# Patient Record
Sex: FEMALE | Race: OTHER | HISPANIC OR LATINO | Employment: UNEMPLOYED | URBAN - METROPOLITAN AREA
[De-identification: names, ages, dates, MRNs, and addresses within clinical notes are randomized per-mention and may not be internally consistent; named-entity substitution may affect disease eponyms.]

---

## 2017-03-07 ENCOUNTER — ALLSCRIPTS OFFICE VISIT (OUTPATIENT)
Dept: OTHER | Facility: OTHER | Age: 52
End: 2017-03-07

## 2017-03-07 DIAGNOSIS — D72.829 ELEVATED WHITE BLOOD CELL COUNT: ICD-10-CM

## 2017-03-07 DIAGNOSIS — E03.9 HYPOTHYROIDISM: ICD-10-CM

## 2017-03-12 ENCOUNTER — HOSPITAL ENCOUNTER (EMERGENCY)
Facility: HOSPITAL | Age: 52
Discharge: HOME | End: 2017-03-12
Attending: EMERGENCY MEDICINE | Admitting: EMERGENCY MEDICINE
Payer: COMMERCIAL

## 2017-03-12 ENCOUNTER — APPOINTMENT (EMERGENCY)
Dept: RADIOLOGY | Facility: HOSPITAL | Age: 52
End: 2017-03-12
Payer: COMMERCIAL

## 2017-03-12 VITALS
DIASTOLIC BLOOD PRESSURE: 83 MMHG | RESPIRATION RATE: 18 BRPM | TEMPERATURE: 98.1 F | OXYGEN SATURATION: 97 % | HEART RATE: 72 BPM | WEIGHT: 130 LBS | BODY MASS INDEX: 25.39 KG/M2 | SYSTOLIC BLOOD PRESSURE: 141 MMHG

## 2017-03-12 DIAGNOSIS — J20.9 BRONCHITIS, ACUTE: ICD-10-CM

## 2017-03-12 DIAGNOSIS — R52 BODY ACHES: Primary | ICD-10-CM

## 2017-03-12 LAB
ALBUMIN SERPL BCP-MCNC: 3.8 G/DL (ref 3.5–5)
ALP SERPL-CCNC: 55 U/L (ref 46–116)
ALT SERPL W P-5'-P-CCNC: 20 U/L (ref 12–78)
ANION GAP SERPL CALCULATED.3IONS-SCNC: 13 MMOL/L (ref 4–13)
AST SERPL W P-5'-P-CCNC: 14 U/L (ref 5–45)
BACTERIA UR QL AUTO: ABNORMAL /HPF
BASOPHILS # BLD AUTO: 0.04 THOUSANDS/ΜL (ref 0–0.1)
BASOPHILS NFR BLD AUTO: 0 % (ref 0–1)
BILIRUB SERPL-MCNC: 1.24 MG/DL (ref 0.2–1)
BILIRUB UR QL STRIP: ABNORMAL
BUN SERPL-MCNC: 16 MG/DL (ref 5–25)
CALCIUM SERPL-MCNC: 9.1 MG/DL (ref 8.3–10.1)
CHLORIDE SERPL-SCNC: 103 MMOL/L (ref 100–108)
CLARITY UR: ABNORMAL
CO2 SERPL-SCNC: 24 MMOL/L (ref 21–32)
COLOR UR: ABNORMAL
CREAT SERPL-MCNC: 0.8 MG/DL (ref 0.6–1.3)
EOSINOPHIL # BLD AUTO: 0.06 THOUSAND/ΜL (ref 0–0.61)
EOSINOPHIL NFR BLD AUTO: 0 % (ref 0–6)
ERYTHROCYTE [DISTWIDTH] IN BLOOD BY AUTOMATED COUNT: 14.2 % (ref 11.6–15.1)
FLUAV AG SPEC QL IA: NEGATIVE
FLUBV AG SPEC QL IA: NEGATIVE
GFR SERPL CREATININE-BSD FRML MDRD: >60 ML/MIN/1.73SQ M
GLUCOSE SERPL-MCNC: 96 MG/DL (ref 65–140)
GLUCOSE UR STRIP-MCNC: NEGATIVE MG/DL
HCT VFR BLD AUTO: 42.8 % (ref 34.8–46.1)
HGB BLD-MCNC: 15.3 G/DL (ref 11.5–15.4)
HGB UR QL STRIP.AUTO: ABNORMAL
KETONES UR STRIP-MCNC: ABNORMAL MG/DL
LEUKOCYTE ESTERASE UR QL STRIP: NEGATIVE
LYMPHOCYTES # BLD AUTO: 1.98 THOUSANDS/ΜL (ref 0.6–4.47)
LYMPHOCYTES NFR BLD AUTO: 10 % (ref 14–44)
MCH RBC QN AUTO: 32.4 PG (ref 26.8–34.3)
MCHC RBC AUTO-ENTMCNC: 35.7 G/DL (ref 31.4–37.4)
MCV RBC AUTO: 91 FL (ref 82–98)
MONOCYTES # BLD AUTO: 1.71 THOUSAND/ΜL (ref 0.17–1.22)
MONOCYTES NFR BLD AUTO: 9 % (ref 4–12)
MUCOUS THREADS UR QL AUTO: ABNORMAL
NEUTROPHILS # BLD AUTO: 15.48 THOUSANDS/ΜL (ref 1.85–7.62)
NEUTS SEG NFR BLD AUTO: 81 % (ref 43–75)
NITRITE UR QL STRIP: NEGATIVE
NON-SQ EPI CELLS URNS QL MICRO: ABNORMAL /HPF
NRBC BLD AUTO-RTO: 0 /100 WBCS
PH UR STRIP.AUTO: 5.5 [PH] (ref 4.5–8)
PLATELET # BLD AUTO: 221 THOUSANDS/UL (ref 149–390)
PMV BLD AUTO: 12.6 FL (ref 8.9–12.7)
POTASSIUM SERPL-SCNC: 3.7 MMOL/L (ref 3.5–5.3)
PROT SERPL-MCNC: 7.7 G/DL (ref 6.4–8.2)
PROT UR STRIP-MCNC: ABNORMAL MG/DL
RBC # BLD AUTO: 4.72 MILLION/UL (ref 3.81–5.12)
RBC #/AREA URNS AUTO: ABNORMAL /HPF
SODIUM SERPL-SCNC: 140 MMOL/L (ref 136–145)
SP GR UR STRIP.AUTO: 1.02 (ref 1–1.03)
UROBILINOGEN UR QL STRIP.AUTO: 1 E.U./DL
WBC # BLD AUTO: 19.27 THOUSAND/UL (ref 4.31–10.16)
WBC #/AREA URNS AUTO: ABNORMAL /HPF

## 2017-03-12 PROCEDURE — 71020 HB CHEST X-RAY 2VW FRONTAL&LATL: CPT

## 2017-03-12 PROCEDURE — 96361 HYDRATE IV INFUSION ADD-ON: CPT

## 2017-03-12 PROCEDURE — 87086 URINE CULTURE/COLONY COUNT: CPT

## 2017-03-12 PROCEDURE — 99284 EMERGENCY DEPT VISIT MOD MDM: CPT

## 2017-03-12 PROCEDURE — 87400 INFLUENZA A/B EACH AG IA: CPT | Performed by: EMERGENCY MEDICINE

## 2017-03-12 PROCEDURE — 87798 DETECT AGENT NOS DNA AMP: CPT | Performed by: EMERGENCY MEDICINE

## 2017-03-12 PROCEDURE — 96360 HYDRATION IV INFUSION INIT: CPT

## 2017-03-12 PROCEDURE — 81001 URINALYSIS AUTO W/SCOPE: CPT

## 2017-03-12 PROCEDURE — 36415 COLL VENOUS BLD VENIPUNCTURE: CPT | Performed by: EMERGENCY MEDICINE

## 2017-03-12 PROCEDURE — 81002 URINALYSIS NONAUTO W/O SCOPE: CPT | Performed by: EMERGENCY MEDICINE

## 2017-03-12 PROCEDURE — 85025 COMPLETE CBC W/AUTO DIFF WBC: CPT | Performed by: EMERGENCY MEDICINE

## 2017-03-12 PROCEDURE — 80053 COMPREHEN METABOLIC PANEL: CPT | Performed by: EMERGENCY MEDICINE

## 2017-03-12 PROCEDURE — 93005 ELECTROCARDIOGRAM TRACING: CPT | Performed by: EMERGENCY MEDICINE

## 2017-03-12 RX ORDER — ACETAMINOPHEN 325 MG/1
975 TABLET ORAL ONCE
Status: COMPLETED | OUTPATIENT
Start: 2017-03-12 | End: 2017-03-12

## 2017-03-12 RX ORDER — LEVOFLOXACIN 750 MG/1
750 TABLET ORAL DAILY
Qty: 7 TABLET | Refills: 0 | Status: SHIPPED | OUTPATIENT
Start: 2017-03-12 | End: 2017-03-19

## 2017-03-12 RX ADMIN — ACETAMINOPHEN 975 MG: 325 TABLET, FILM COATED ORAL at 14:25

## 2017-03-12 RX ADMIN — SODIUM CHLORIDE 1000 ML: 0.9 INJECTION, SOLUTION INTRAVENOUS at 16:25

## 2017-03-12 RX ADMIN — LEVOFLOXACIN 750 MG: 500 TABLET, FILM COATED ORAL at 16:26

## 2017-03-12 RX ADMIN — SODIUM CHLORIDE 1000 ML: 0.9 INJECTION, SOLUTION INTRAVENOUS at 14:29

## 2017-03-13 LAB
BACTERIA UR CULT: NORMAL
FLUAV AG SPEC QL: NORMAL
FLUBV AG SPEC QL: NORMAL
RSV B RNA SPEC QL NAA+PROBE: NORMAL

## 2017-03-14 LAB
ATRIAL RATE: 58 BPM
P AXIS: 29 DEGREES
PR INTERVAL: 152 MS
QRS AXIS: 86 DEGREES
QRSD INTERVAL: 76 MS
QT INTERVAL: 418 MS
QTC INTERVAL: 410 MS
T WAVE AXIS: 57 DEGREES
VENTRICULAR RATE: 58 BPM

## 2017-03-20 ENCOUNTER — ALLSCRIPTS OFFICE VISIT (OUTPATIENT)
Dept: OTHER | Facility: OTHER | Age: 52
End: 2017-03-20

## 2017-05-15 ENCOUNTER — APPOINTMENT (OUTPATIENT)
Dept: LAB | Facility: HOSPITAL | Age: 52
End: 2017-05-15
Payer: COMMERCIAL

## 2017-05-15 ENCOUNTER — GENERIC CONVERSION - ENCOUNTER (OUTPATIENT)
Dept: OTHER | Facility: OTHER | Age: 52
End: 2017-05-15

## 2017-05-15 DIAGNOSIS — Z87.2 PERSONAL HISTORY OF DISEASES OF SKIN OR SUBCUTANEOUS TISSUE: ICD-10-CM

## 2017-05-15 DIAGNOSIS — E03.9 HYPOTHYROIDISM: ICD-10-CM

## 2017-05-15 DIAGNOSIS — R91.1 SOLITARY PULMONARY NODULE: ICD-10-CM

## 2017-05-15 LAB
ALBUMIN SERPL BCP-MCNC: 3.5 G/DL (ref 3.5–5)
ALP SERPL-CCNC: 56 U/L (ref 46–116)
ALT SERPL W P-5'-P-CCNC: 19 U/L (ref 12–78)
ANION GAP SERPL CALCULATED.3IONS-SCNC: 5 MMOL/L (ref 4–13)
AST SERPL W P-5'-P-CCNC: 14 U/L (ref 5–45)
BASOPHILS # BLD AUTO: 0.05 THOUSANDS/ΜL (ref 0–0.1)
BASOPHILS NFR BLD AUTO: 1 % (ref 0–1)
BILIRUB SERPL-MCNC: 0.3 MG/DL (ref 0.2–1)
BUN SERPL-MCNC: 10 MG/DL (ref 5–25)
CALCIUM SERPL-MCNC: 8.8 MG/DL (ref 8.3–10.1)
CHLORIDE SERPL-SCNC: 105 MMOL/L (ref 100–108)
CO2 SERPL-SCNC: 30 MMOL/L (ref 21–32)
CREAT SERPL-MCNC: 0.88 MG/DL (ref 0.6–1.3)
EOSINOPHIL # BLD AUTO: 0.3 THOUSAND/ΜL (ref 0–0.61)
EOSINOPHIL NFR BLD AUTO: 4 % (ref 0–6)
ERYTHROCYTE [DISTWIDTH] IN BLOOD BY AUTOMATED COUNT: 14.9 % (ref 11.6–15.1)
GFR SERPL CREATININE-BSD FRML MDRD: >60 ML/MIN/1.73SQ M
GLUCOSE P FAST SERPL-MCNC: 94 MG/DL (ref 65–99)
HCT VFR BLD AUTO: 43.2 % (ref 34.8–46.1)
HGB BLD-MCNC: 14.7 G/DL (ref 11.5–15.4)
LYMPHOCYTES # BLD AUTO: 3 THOUSANDS/ΜL (ref 0.6–4.47)
LYMPHOCYTES NFR BLD AUTO: 36 % (ref 14–44)
MCH RBC QN AUTO: 31.1 PG (ref 26.8–34.3)
MCHC RBC AUTO-ENTMCNC: 34 G/DL (ref 31.4–37.4)
MCV RBC AUTO: 92 FL (ref 82–98)
MONOCYTES # BLD AUTO: 1.06 THOUSAND/ΜL (ref 0.17–1.22)
MONOCYTES NFR BLD AUTO: 13 % (ref 4–12)
NEUTROPHILS # BLD AUTO: 3.93 THOUSANDS/ΜL (ref 1.85–7.62)
NEUTS SEG NFR BLD AUTO: 46 % (ref 43–75)
PLATELET # BLD AUTO: 256 THOUSANDS/UL (ref 149–390)
PMV BLD AUTO: 12.2 FL (ref 8.9–12.7)
POTASSIUM SERPL-SCNC: 3.9 MMOL/L (ref 3.5–5.3)
PROT SERPL-MCNC: 6.8 G/DL (ref 6.4–8.2)
RBC # BLD AUTO: 4.72 MILLION/UL (ref 3.81–5.12)
SODIUM SERPL-SCNC: 140 MMOL/L (ref 136–145)
TSH SERPL DL<=0.05 MIU/L-ACNC: 39.61 UIU/ML (ref 0.36–3.74)
WBC # BLD AUTO: 8.34 THOUSAND/UL (ref 4.31–10.16)

## 2017-05-15 PROCEDURE — 36415 COLL VENOUS BLD VENIPUNCTURE: CPT

## 2017-05-15 PROCEDURE — 85025 COMPLETE CBC W/AUTO DIFF WBC: CPT

## 2017-05-15 PROCEDURE — 84443 ASSAY THYROID STIM HORMONE: CPT

## 2017-05-15 PROCEDURE — 80053 COMPREHEN METABOLIC PANEL: CPT

## 2017-05-17 ENCOUNTER — TRANSCRIBE ORDERS (OUTPATIENT)
Dept: ADMINISTRATIVE | Facility: HOSPITAL | Age: 52
End: 2017-05-17

## 2017-05-17 DIAGNOSIS — Z12.31 VISIT FOR SCREENING MAMMOGRAM: Primary | ICD-10-CM

## 2017-05-22 ENCOUNTER — HOSPITAL ENCOUNTER (OUTPATIENT)
Dept: MAMMOGRAPHY | Facility: HOSPITAL | Age: 52
Discharge: HOME | End: 2017-05-22
Payer: COMMERCIAL

## 2017-05-22 ENCOUNTER — GENERIC CONVERSION - ENCOUNTER (OUTPATIENT)
Dept: OTHER | Facility: OTHER | Age: 52
End: 2017-05-22

## 2017-05-22 DIAGNOSIS — Z12.31 ENCOUNTER FOR SCREENING MAMMOGRAM FOR MALIGNANT NEOPLASM OF BREAST: ICD-10-CM

## 2017-05-22 PROCEDURE — G0202 SCR MAMMO BI INCL CAD: HCPCS

## 2017-05-26 ENCOUNTER — HOSPITAL ENCOUNTER (EMERGENCY)
Facility: HOSPITAL | Age: 52
Discharge: HOME | End: 2017-05-26
Admitting: EMERGENCY MEDICINE
Payer: COMMERCIAL

## 2017-05-26 ENCOUNTER — APPOINTMENT (EMERGENCY)
Dept: RADIOLOGY | Facility: HOSPITAL | Age: 52
End: 2017-05-26
Payer: COMMERCIAL

## 2017-05-26 ENCOUNTER — LAB CONVERSION - ENCOUNTER (OUTPATIENT)
Dept: OTHER | Facility: OTHER | Age: 52
End: 2017-05-26

## 2017-05-26 VITALS
HEART RATE: 62 BPM | TEMPERATURE: 98.1 F | WEIGHT: 134.48 LBS | RESPIRATION RATE: 16 BRPM | OXYGEN SATURATION: 94 % | DIASTOLIC BLOOD PRESSURE: 63 MMHG | SYSTOLIC BLOOD PRESSURE: 107 MMHG | BODY MASS INDEX: 26.26 KG/M2

## 2017-05-26 DIAGNOSIS — R07.9 CHEST PAIN: Primary | ICD-10-CM

## 2017-05-26 DIAGNOSIS — T14.8XXA MUSCULOSKELETAL STRAIN: ICD-10-CM

## 2017-05-26 LAB
ANION GAP SERPL CALCULATED.3IONS-SCNC: 7 MMOL/L (ref 4–13)
ANISOCYTOSIS BLD QL SMEAR: PRESENT
ATRIAL RATE: 66 BPM
BASOPHILS # BLD MANUAL: 0 THOUSAND/UL (ref 0–0.1)
BASOPHILS NFR MAR MANUAL: 0 % (ref 0–1)
BUN SERPL-MCNC: 13 MG/DL (ref 5–25)
CALCIUM SERPL-MCNC: 9.1 MG/DL (ref 8.3–10.1)
CHLORIDE SERPL-SCNC: 105 MMOL/L (ref 100–108)
CO2 SERPL-SCNC: 29 MMOL/L (ref 21–32)
CREAT SERPL-MCNC: 0.88 MG/DL (ref 0.6–1.3)
DEPRECATED D DIMER PPP: <270 NG/ML (FEU) (ref 0–424)
EOSINOPHIL # BLD MANUAL: 0.17 THOUSAND/UL (ref 0–0.4)
EOSINOPHIL NFR BLD MANUAL: 2 % (ref 0–6)
ERYTHROCYTE [DISTWIDTH] IN BLOOD BY AUTOMATED COUNT: 14.6 % (ref 11.6–15.1)
GFR SERPL CREATININE-BSD FRML MDRD: >60 ML/MIN/1.73SQ M
GLUCOSE SERPL-MCNC: 116 MG/DL (ref 65–140)
HCT VFR BLD AUTO: 40.2 % (ref 34.8–46.1)
HGB BLD-MCNC: 14.4 G/DL (ref 11.5–15.4)
LG PLATELETS BLD QL SMEAR: PRESENT
LYMPHOCYTES # BLD AUTO: 1.6 THOUSAND/UL (ref 0.6–4.47)
LYMPHOCYTES # BLD AUTO: 19 % (ref 14–44)
MCH RBC QN AUTO: 32.5 PG (ref 26.8–34.3)
MCHC RBC AUTO-ENTMCNC: 35.8 G/DL (ref 31.4–37.4)
MCV RBC AUTO: 91 FL (ref 82–98)
MONOCYTES # BLD AUTO: 0.93 THOUSAND/UL (ref 0–1.22)
MONOCYTES NFR BLD: 11 % (ref 4–12)
NEUTROPHILS # BLD MANUAL: 5.47 THOUSAND/UL (ref 1.85–7.62)
NEUTS BAND NFR BLD MANUAL: 2 % (ref 0–8)
NEUTS SEG NFR BLD AUTO: 63 % (ref 43–75)
NRBC BLD AUTO-RTO: 0 /100 WBCS
P AXIS: 59 DEGREES
PLATELET # BLD AUTO: 279 THOUSANDS/UL (ref 149–390)
PLATELET BLD QL SMEAR: ADEQUATE
PMV BLD AUTO: 12.5 FL (ref 8.9–12.7)
POTASSIUM SERPL-SCNC: 3.6 MMOL/L (ref 3.5–5.3)
PR INTERVAL: 178 MS
QRS AXIS: 83 DEGREES
QRSD INTERVAL: 80 MS
QT INTERVAL: 392 MS
QTC INTERVAL: 410 MS
RBC # BLD AUTO: 4.43 MILLION/UL (ref 3.81–5.12)
SODIUM SERPL-SCNC: 141 MMOL/L (ref 136–145)
SPECIMEN SOURCE: NORMAL
T WAVE AXIS: 63 DEGREES
TOTAL CELLS COUNTED SPEC: 100
TROPONIN I BLD-MCNC: 0 NG/ML (ref 0–0.08)
VARIANT LYMPHS # BLD AUTO: 3 %
VENTRICULAR RATE: 66 BPM
WBC # BLD AUTO: 8.41 THOUSAND/UL (ref 4.31–10.16)

## 2017-05-26 PROCEDURE — 36415 COLL VENOUS BLD VENIPUNCTURE: CPT | Performed by: PHYSICIAN ASSISTANT

## 2017-05-26 PROCEDURE — 71020 HB CHEST X-RAY 2VW FRONTAL&LATL: CPT

## 2017-05-26 PROCEDURE — 85379 FIBRIN DEGRADATION QUANT: CPT | Performed by: PHYSICIAN ASSISTANT

## 2017-05-26 PROCEDURE — 85027 COMPLETE CBC AUTOMATED: CPT | Performed by: PHYSICIAN ASSISTANT

## 2017-05-26 PROCEDURE — 85007 BL SMEAR W/DIFF WBC COUNT: CPT | Performed by: PHYSICIAN ASSISTANT

## 2017-05-26 PROCEDURE — 80048 BASIC METABOLIC PNL TOTAL CA: CPT | Performed by: PHYSICIAN ASSISTANT

## 2017-05-26 PROCEDURE — 93005 ELECTROCARDIOGRAM TRACING: CPT | Performed by: PHYSICIAN ASSISTANT

## 2017-05-26 PROCEDURE — 99284 EMERGENCY DEPT VISIT MOD MDM: CPT

## 2017-05-26 PROCEDURE — 84484 ASSAY OF TROPONIN QUANT: CPT

## 2017-05-26 RX ORDER — DIAZEPAM 5 MG/1
5 TABLET ORAL ONCE
Status: COMPLETED | OUTPATIENT
Start: 2017-05-26 | End: 2017-05-26

## 2017-05-26 RX ORDER — METHOCARBAMOL 500 MG/1
500 TABLET, FILM COATED ORAL 4 TIMES DAILY
Qty: 12 TABLET | Refills: 0 | Status: SHIPPED | OUTPATIENT
Start: 2017-05-26 | End: 2018-02-21

## 2017-05-26 RX ORDER — CETIRIZINE HYDROCHLORIDE 10 MG/1
10 TABLET ORAL DAILY
COMMUNITY
End: 2018-02-21

## 2017-05-26 RX ORDER — ACETAMINOPHEN 325 MG/1
TABLET ORAL
Qty: 20 TABLET | Refills: 0 | Status: SHIPPED | OUTPATIENT
Start: 2017-05-26 | End: 2018-02-21

## 2017-05-26 RX ORDER — NITROGLYCERIN 0.4 MG/1
0.4 TABLET SUBLINGUAL ONCE
Status: COMPLETED | OUTPATIENT
Start: 2017-05-26 | End: 2017-05-26

## 2017-05-26 RX ADMIN — NITROGLYCERIN 0.4 MG: 0.4 TABLET SUBLINGUAL at 18:17

## 2017-05-26 RX ADMIN — DIAZEPAM 5 MG: 5 TABLET ORAL at 18:51

## 2017-05-31 ENCOUNTER — ALLSCRIPTS OFFICE VISIT (OUTPATIENT)
Dept: OTHER | Facility: OTHER | Age: 52
End: 2017-05-31

## 2017-05-31 ENCOUNTER — TRANSCRIBE ORDERS (OUTPATIENT)
Dept: ADMINISTRATIVE | Facility: HOSPITAL | Age: 52
End: 2017-05-31

## 2017-05-31 DIAGNOSIS — R91.1 COIN LESION: Primary | ICD-10-CM

## 2017-06-07 ENCOUNTER — HOSPITAL ENCOUNTER (OUTPATIENT)
Dept: CT IMAGING | Facility: HOSPITAL | Age: 52
Discharge: HOME | End: 2017-06-07
Payer: COMMERCIAL

## 2017-06-07 DIAGNOSIS — R91.1 SOLITARY PULMONARY NODULE: ICD-10-CM

## 2017-06-07 PROCEDURE — 71250 CT THORAX DX C-: CPT

## 2017-06-09 ENCOUNTER — GENERIC CONVERSION - ENCOUNTER (OUTPATIENT)
Dept: OTHER | Facility: OTHER | Age: 52
End: 2017-06-09

## 2017-08-08 ENCOUNTER — APPOINTMENT (OUTPATIENT)
Dept: LAB | Facility: HOSPITAL | Age: 52
End: 2017-08-08
Payer: COMMERCIAL

## 2017-08-08 ENCOUNTER — TRANSCRIBE ORDERS (OUTPATIENT)
Dept: ADMINISTRATIVE | Facility: HOSPITAL | Age: 52
End: 2017-08-08

## 2017-08-08 DIAGNOSIS — E03.9 UNSPECIFIED HYPOTHYROIDISM: Primary | ICD-10-CM

## 2017-08-08 DIAGNOSIS — E03.9 UNSPECIFIED HYPOTHYROIDISM: ICD-10-CM

## 2017-08-08 LAB
ALBUMIN SERPL BCP-MCNC: 3.6 G/DL (ref 3.5–5)
ALP SERPL-CCNC: 52 U/L (ref 46–116)
ALT SERPL W P-5'-P-CCNC: 28 U/L (ref 12–78)
ANION GAP SERPL CALCULATED.3IONS-SCNC: 7 MMOL/L (ref 4–13)
AST SERPL W P-5'-P-CCNC: 17 U/L (ref 5–45)
BASOPHILS # BLD AUTO: 0.04 THOUSANDS/ΜL (ref 0–0.1)
BASOPHILS NFR BLD AUTO: 1 % (ref 0–1)
BILIRUB SERPL-MCNC: 0.48 MG/DL (ref 0.2–1)
BUN SERPL-MCNC: 11 MG/DL (ref 5–25)
CALCIUM SERPL-MCNC: 9.4 MG/DL (ref 8.3–10.1)
CHLORIDE SERPL-SCNC: 102 MMOL/L (ref 100–108)
CO2 SERPL-SCNC: 30 MMOL/L (ref 21–32)
CREAT SERPL-MCNC: 0.88 MG/DL (ref 0.6–1.3)
EOSINOPHIL # BLD AUTO: 0.23 THOUSAND/ΜL (ref 0–0.61)
EOSINOPHIL NFR BLD AUTO: 4 % (ref 0–6)
ERYTHROCYTE [DISTWIDTH] IN BLOOD BY AUTOMATED COUNT: 14.2 % (ref 11.6–15.1)
GFR SERPL CREATININE-BSD FRML MDRD: 75 ML/MIN/1.73SQ M
GLUCOSE P FAST SERPL-MCNC: 145 MG/DL (ref 65–99)
HCT VFR BLD AUTO: 44 % (ref 34.8–46.1)
HGB BLD-MCNC: 15.2 G/DL (ref 11.5–15.4)
LYMPHOCYTES # BLD AUTO: 1.85 THOUSANDS/ΜL (ref 0.6–4.47)
LYMPHOCYTES NFR BLD AUTO: 30 % (ref 14–44)
MCH RBC QN AUTO: 30.5 PG (ref 26.8–34.3)
MCHC RBC AUTO-ENTMCNC: 34.5 G/DL (ref 31.4–37.4)
MCV RBC AUTO: 88 FL (ref 82–98)
MONOCYTES # BLD AUTO: 0.64 THOUSAND/ΜL (ref 0.17–1.22)
MONOCYTES NFR BLD AUTO: 10 % (ref 4–12)
NEUTROPHILS # BLD AUTO: 3.45 THOUSANDS/ΜL (ref 1.85–7.62)
NEUTS SEG NFR BLD AUTO: 55 % (ref 43–75)
PLATELET # BLD AUTO: 265 THOUSANDS/UL (ref 149–390)
PMV BLD AUTO: 12.6 FL (ref 8.9–12.7)
POTASSIUM SERPL-SCNC: 3.9 MMOL/L (ref 3.5–5.3)
PROT SERPL-MCNC: 7 G/DL (ref 6.4–8.2)
RBC # BLD AUTO: 4.98 MILLION/UL (ref 3.81–5.12)
SODIUM SERPL-SCNC: 139 MMOL/L (ref 136–145)
TSH SERPL DL<=0.05 MIU/L-ACNC: 0.58 UIU/ML (ref 0.36–3.74)
WBC # BLD AUTO: 6.21 THOUSAND/UL (ref 4.31–10.16)

## 2017-08-08 PROCEDURE — 84443 ASSAY THYROID STIM HORMONE: CPT

## 2017-08-08 PROCEDURE — 80053 COMPREHEN METABOLIC PANEL: CPT

## 2017-08-08 PROCEDURE — 36415 COLL VENOUS BLD VENIPUNCTURE: CPT

## 2017-08-08 PROCEDURE — 85025 COMPLETE CBC W/AUTO DIFF WBC: CPT

## 2017-08-09 ENCOUNTER — GENERIC CONVERSION - ENCOUNTER (OUTPATIENT)
Dept: OTHER | Facility: OTHER | Age: 52
End: 2017-08-09

## 2017-09-12 ENCOUNTER — ALLSCRIPTS OFFICE VISIT (OUTPATIENT)
Dept: OTHER | Facility: OTHER | Age: 52
End: 2017-09-12

## 2017-09-12 DIAGNOSIS — E03.9 HYPOTHYROIDISM: ICD-10-CM

## 2017-09-28 ENCOUNTER — APPOINTMENT (OUTPATIENT)
Dept: LAB | Facility: HOSPITAL | Age: 52
End: 2017-09-28
Payer: COMMERCIAL

## 2017-09-28 ENCOUNTER — GENERIC CONVERSION - ENCOUNTER (OUTPATIENT)
Dept: OTHER | Facility: OTHER | Age: 52
End: 2017-09-28

## 2017-09-28 DIAGNOSIS — E03.9 HYPOTHYROIDISM: ICD-10-CM

## 2017-09-28 LAB — TSH SERPL DL<=0.05 MIU/L-ACNC: 0.37 UIU/ML (ref 0.36–3.74)

## 2017-09-28 PROCEDURE — 36415 COLL VENOUS BLD VENIPUNCTURE: CPT

## 2017-09-28 PROCEDURE — 84443 ASSAY THYROID STIM HORMONE: CPT

## 2018-01-10 NOTE — PROGRESS NOTES
Assessment    1  Encounter for preventive health examination (V70 0) (Z00 00)    Plan  Allergic rhinitis, Asthma    · Alexey WINN, John Denise (Allergy/Immunology) Co-Management  *  Status: Complete   Done: [id number]  are Referring to a non- Preferred Provider : Services not provided in network  Care Summary provided  : Yes  Asplenia    · Td  Colon cancer screening    · (1) Kat Singleton; Status:Active; Requested for:93Gpn9903;   cont  : I authorize [address] to obtain reimbursement for      Cologuard and to directly contact and [facility]      if reportable results are not obtained from the initial sample   cont  : I accept responsibility for maintaining the privacy of test results and      related information as required by HIPAA   : By ordering Cologuard, I certify that I am a licensed medical professional      authorized to order Cologuard  I acknowledge that the test is medically necessary and      that the patient is eligible to use Cologuard  Encounter for screening colonoscopy    · *1 -  [facility] Co-Management  *Should do screening  colonoscopy  Status: Canceled  Care Summary provided  : Yes  Hypothyroidism    · (1) TSH; Status:Active; Requested for:33Wku8987;     Discussion/Summary  health maintenance visit cervical cancer screening is current     Doing well overall -     Previous chest x-ray at emergency room showed possible lung nodule, CT scan chest was negative  Past smoker, quit around 3 yrs ago  Still notes chest tightness w need for rescue inhaler up to twice a day despite using Flovent 11 BID and Singulair daily- last PFT 9/15 w mild obstructive findings w no reversibility  I'd like her to see Allergist   Do Flu shot in October  ** Hx Splenectomy ** She rcvd Menactra/ HIB/ Pneumovax in past  Rcvd Tdap remotely - unsure date - can do Td  today    Had chickenpox as child - can do Zostavax w Flu shot in Oct ( after 4 weeks) Previous issue with neck pain along with right cervical radiculitis resolved, she stopped gabapentin    On Levothyroxine - TSH improved to 0 583 early August - use med as is and redo TSH once more in Oct to monitor for oversuppression ( prev TSH in May at 39 6)  CBC/CMP ok ( FBS fine few months ago - recent BW non-fasting )    She feels she cannot drink liquid for colonoscopy prep - look into coverage for ColoGuard  Mammo ok 5/17  ( does have small inclusion cyst right breast - if enlarges or worsens- can set up w Breast Spec )    UTD w PAP ( 11/15) Redo 11/18    Recheck here 6 mo  Call sooner as needed  Chief Complaint  Physical      History of Present Illness  HPI: in for check up      Active Problems    1  Allergic rhinitis (477 9) (J30 9)   2  Asthma (493 90) (J45 909)   3  History of ITP (V12 3) (Z86 2)   4  Hypothyroidism (244 9) (E03 9)   5  Leiomyoma of uterus (218 9) (D25 9)   6  Leukocytosis (288 60) (D72 829)   7  Need for revaccination (V05 9) (Z23)   8  History of Right cervical radiculopathy (723 4) (M54 12)   9  History of Splenectomy    Past Medical History    · History of Accidental fall (E888 9) (W19 XXXA)   · History of Cellulitis of left thigh (682 6) (L03 116)   · History of dermatitis (V13 3) (Z87 2)   · History of ingrown nail (V13 3) (Z87 2)   · History of ITP (V12 3) (Z86 2)   · History of pneumonia (V12 61) (Z87 01)   · Personal history of ovarian cyst (V13 29) (Z87 42)   · History of Previous Pregnancy - Vaginal Delivery   · History of Right cervical radiculopathy (723 4) (M54 12)    Surgical History    · History of Splenectomy   · History of Tubal Ligation    Family History  Sister    · Family history of malignant neoplasm of breast (V16 3) (Z80 3)  Family History    · Family history of diabetes mellitus (V18 0) (Z83 3)    Social History    · Exercise: Walking   · Former smoker (V15 82) (M61 543)   · quit around age 46   · No alcohol use   · Single    Current Meds   1   Flovent  MCG/ACT Inhalation Aerosol; Inhale 2 puffs twice a day; Therapy: 16WIH8768 to (Evaluate:10Oct2017)  Requested for: 13Apr2017; Last   Rx:13Apr2017 Ordered   2  Fluticasone Propionate 50 MCG/ACT Nasal Suspension; USE 2 SPRAYS IN EACH   NOSTRIL ONCE DAILY; Therapy: 11Lml2329 to (Marv Estrella)  Requested for: 13Apr2017; Last   Rx:13Apr2017 Ordered   3  Levothyroxine Sodium 125 MCG Oral Tablet; take 1 tablet every day; Therapy: 54Lax2584 to (Evaluate:09Mgm0372)  Requested for: 94MGH5288; Last   Rx:88Epr7456 Ordered   4  Montelukast Sodium 10 MG Oral Tablet; TAKE 1 TABLET DAILY; Therapy: 24Vzs4640 to (Evaluate:11Aug2017)  Requested for: 13Apr2017; Last   Rx:13Apr2017 Ordered   5  Ventolin  (90 Base) MCG/ACT Inhalation Aerosol Solution; INHALE 2 PUFFS 3   TIMES A DAY only AS NEEDED; Therapy: 52Hze3147 to (Evaluate:69Qlm3447)  Requested for: 13Apr2017; Last   Rx:13Apr2017 Ordered    Allergies    1  Aspirin Adult Low Strength TBEC   2  Ibuprofen IB TABS    Vitals   Recorded: 12Sep2017 09:48AM   Heart Rate 64   Systolic 967   Diastolic 84   Height 5 ft 3 in   Weight 129 lb    BMI Calculated 22 85   BSA Calculated 1 6     Physical Exam    Constitutional   General appearance: No acute distress, well appearing and well nourished  Head and Face   Head and face: Normal     Eyes   Conjunctiva and lids: No swelling, erythema or discharge  Ears, Nose, Mouth, and Throat   Hearing: Normal     Oropharynx: Normal with no erythema, edema, exudate or lesions  Neck   Neck: Supple, symmetric, trachea midline, no masses  somewhat bulky  Pulmonary   Auscultation of lungs: Clear to auscultation  Cardiovascular   Auscultation of heart: Normal rate and rhythm, normal S1 and S2, no murmurs  Carotid pulses: 2+ bilaterally  Examination of extremities for edema and/or varicosities: Normal     Chest right 4 oclock inclusion cyst - tiny  Abdomen   Abdomen: Non-tender, no masses      Lymphatic Palpation of lymph nodes in neck: No lymphadenopathy  Musculoskeletal   Range of motion: Normal     Stability: Normal     Muscle strength/tone: Normal     Neurologic   Cortical function: Normal mental status  Coordination: Normal finger to nose and heel to shin  Psychiatric   Judgment and insight: Normal     Orientation to person, place, and time: Normal     Recent and remote memory: Intact  Mood and affect: Normal        Results/Data  (1) COMPREHENSIVE METABOLIC PANEL [id number] 19:09SS Dillon Fox     Test Name Result Flag Reference   SODIUM 139 mmol/L  136-145   POTASSIUM 3 9 mmol/L  3 5-5 3   CHLORIDE 102 mmol/L  100-108   CARBON DIOXIDE 30 mmol/L  21-32   ANION GAP (CALC) 7 mmol/L  4-13   BLOOD UREA NITROGEN 11 mg/dL  5-25   CREATININE 0 88 mg/dL  0 60-1 30   Standardized to IDMS reference method   CALCIUM 9 4 mg/dL  8 3-10 1   BILI, TOTAL 0 48 mg/dL  0 20-1 00   ALK PHOSPHATAS 52 U/L     ALT (SGPT) 28 U/L  12-78   Specimen collection should occur prior to Sulfasalazine administration due to the potential for falsely depressed results  AST(SGOT) 17 U/L  5-45   Specimen collection should occur prior to Sulfasalazine administration due to the potential for falsely depressed results  ALBUMIN 3 6 g/dL  3 5-5 0   TOTAL PROTEIN 7 0 g/dL  6 4-8 2   eGFR 75 ml/min/1 73sq m     [facility] Disease Education Program recommendations are as follows:  GFR calculation is accurate only with a steady state creatinine  Chronic Kidney disease less than 60 ml/min/1 73 sq  meters  Kidney failure less than 15 ml/min/1 73 sq  meters  GLUCOSE FASTING 145 mg/dL H 65-99   Specimen collection should occur prior to Sulfasalazine administration due to the potential for falsely depressed results  Specimen collection should occur prior to Sulfapyridine administration due to the potential for falsely elevated results         Health Management  Health Maintenance   (1) THIN PREP PAP WITH IMAGING; every 1 year; Last 59JWY3120; Next Due:  38HQE4820;  Overdue    Future Appointments    Date/Time Provider Specialty Site   03/13/2018 09:00 AM Kalpesh Ross DO Family Medicine [facility] MEDICINE   10/10/2017 11:00 AM Nurse April Singer  [facility] [address] FAMILY MEDICINE     Signatures   Electronically signed by : Lalo Watkins DO; Sep 12 2017 12:23PM EST                       (Author)

## 2018-01-10 NOTE — RESULT NOTES
Verified Results  * MAMMO SCREENING BILATERAL W CAD [id number] 09:55AM Mary Alexis Order Number: [id number]    - Patient Instructions: To schedule this appointment, please contact Central Scheduling at [phone]  Do not wear any perfume, powder, lotion or deodorant on breast or underarm area  Please bring your doctors order, referral (if needed) and insurance information with you on the day of the test  Failure to bring this information may result in this test being rescheduled  Arrive 15 minutes prior to your appointment time to register  On the day of your test, please bring any prior mammogram or breast studies with you that were not performed at a [facility]  Failure to bring prior exams may result in your test needing to be rescheduled   Order Number: [id number]    - Patient Instructions: To schedule this appointment, please contact Central Scheduling at [phone]  Do not wear any perfume, powder, lotion or deodorant on breast or underarm area  Please bring your doctors order, referral (if needed) and insurance information with you on the day of the test  Failure to bring this information may result in this test being rescheduled  Arrive 15 minutes prior to your appointment time to register  On the day of your test, please bring any prior mammogram or breast studies with you that were not performed at a [facility]  Failure to bring prior exams may result in your test needing to be rescheduled  Test Name Result Flag Reference   MAMMO SCREENING BILATERAL W CAD (Report)     Patient History:   Family history of breast cancer at age 27 in maternal aunt,    breast cancer under age 48 in maternal cousin, breast cancer at    age 39 in sister  Patient is a former smoker  Patient's BMI is 23 4  Reason for exam: screening, asymptomatic       Mammo Screening Bilateral W CAD: May 22, 2017 - Check In #:    [de-identified]   Bilateral CC and MLO view(s) were taken  Technologist: SAM Tanner (SAM)(M)   Prior study comparison: December 16, 2014, bilateral digital    screening mammogram, performed at [place] [address]  December 9, 2013, bilateral digital screening    mammogram, performed at [place] [address]  March 7, 2011, bilateral digital screening mammogram,    performed at [place] [address]  July 1, 2008, bilateral DIGITAL SCREENING MAMMOGRAM, performed at [place] [address]  March 6, 2006, bilateral   screening mammogram w/CAD, performed at [place] [address]  October 20, 2005, left breast unilateral   diagnostic mammogram, performed at [place] [address]  March 3, 2005, bilateral screening mammogram    w/CAD, performed at [place] [address]  There are scattered fibroglandular densities  The parenchymal pattern appears stable  No dominant soft tissue    mass or suspicious calcifications are noted  The skin and nipple   contours are within normal limits  No mammographic evidence of malignancy  No    significant changes when compared with prior studies  ACR BI-RADSï¾® Assessments: BiRad:1 - Negative     Recommendation:   Routine screening mammogram in 1 year  A reminder letter will be   scheduled  Analyzed by CAD     8-10% of cancers will be missed on mammography  Management of a    palpable abnormality must be based on clinical grounds  Patients   will be notified of their results via letter from our facility  Accredited by [employer] of [employer] and FDA       Transcription Location: SAM Saunders 98: [id number]     Risk Value(s):   Tyrer-Cuzick 10 Year: 5 500%, Tyrer-Cuzick Lifetime: 19 400%,    Myriad Table: 5 6%, BAILEY 5 Year: 1 6%, NCI Lifetime: 12 2%, MRS    : Based on personal and/or family history,    consideration of hereditary risk assessment may be warranted     Signed by:   Marii Allison MD   5/22/17

## 2018-01-12 VITALS
SYSTOLIC BLOOD PRESSURE: 124 MMHG | HEIGHT: 60 IN | OXYGEN SATURATION: 98 % | TEMPERATURE: 97.6 F | WEIGHT: 133 LBS | HEART RATE: 60 BPM | BODY MASS INDEX: 26.11 KG/M2 | DIASTOLIC BLOOD PRESSURE: 80 MMHG | RESPIRATION RATE: 16 BRPM

## 2018-01-12 NOTE — RESULT NOTES
Verified Results  (1) TSH [id number] 07:23AM Arron  Order Number: [id number]     Test Name Result Flag Reference   TSH 39 610 uIU/mL H 0 358-3 740   - Patient Instructions: This bloodwork is non-fasting  Please drink two glasses of water morning of bloodwork  Patients undergoing fluorescein dye angiography may retain small amounts of fluorescein in the body for 48-72 hours post procedure  Samples containing fluorescein can produce falsely depressed TSH values  If the patient had this procedure,a specimen should be resubmitted post fluorescein clearance            The recommended reference ranges for TSH during pregnancy are as follows:  First trimester 0 1 to 2 5 uIU/mL  Second trimester  0 2 to 3 0 uIU/mL  Third trimester 0 3 to 3 0 uIU/m

## 2018-01-12 NOTE — RESULT NOTES
Verified Results  * CT CHEST WO CONTRAST 07Jun2017 07:51AM Mahendra Ha Order Number: [id number]   Performing Comments: needs CT chest re Pulm nodule Left lung on recent ER CXR   1 2 cm left   - Patient Instructions: To schedule this appointment, please contact Central Scheduling at [phone]  Test Name Result Flag Reference   CT CHEST WO CONTRAST (Report)     CT CHEST WITHOUT IV CONTRAST     INDICATION: Left upper lobe nodular density on recent chest x-ray  COMPARISON: Chest x-ray 5/26/2017  TECHNIQUE: CT examination of the chest was performed without intravenous contrast  Reformatted images were created in axial, sagittal, and coronal planes  Radiation dose length product (DLP) for this visit: 182 mGy-cm   This examination, like all CT scans performed in the [facility], was performed utilizing techniques to minimize radiation dose exposure, including the use of iterative    reconstruction and automated exposure control  FINDINGS:     LUNGS: There is no lung nodule  Density on previous chest x-ray likely represented confluence of shadows  There is no tracheal or endobronchial lesion  PLEURA: Unremarkable  HEART/GREAT VESSELS: Unremarkable for patient's age  MEDIASTINUM AND RAMYA: Unremarkable  CHEST WALL AND LOWER NECK: Unremarkable  VISUALIZED STRUCTURES IN THE UPPER ABDOMEN: Unremarkable  OSSEOUS STRUCTURES: No acute fracture  No destructive osseous lesion  IMPRESSION:     No lung nodule as questioned on recent chest x-ray  Workstation performed: [id number]     Signed by:    Erin Will MD   6/9/17

## 2018-01-13 VITALS
BODY MASS INDEX: 25.84 KG/M2 | RESPIRATION RATE: 16 BRPM | SYSTOLIC BLOOD PRESSURE: 100 MMHG | HEIGHT: 60 IN | TEMPERATURE: 98.4 F | DIASTOLIC BLOOD PRESSURE: 84 MMHG | WEIGHT: 131.6 LBS | HEART RATE: 64 BPM

## 2018-01-14 VITALS
BODY MASS INDEX: 26.01 KG/M2 | HEIGHT: 60 IN | DIASTOLIC BLOOD PRESSURE: 76 MMHG | WEIGHT: 132.5 LBS | HEART RATE: 56 BPM | SYSTOLIC BLOOD PRESSURE: 120 MMHG

## 2018-01-14 VITALS
WEIGHT: 129 LBS | BODY MASS INDEX: 22.86 KG/M2 | HEIGHT: 63 IN | SYSTOLIC BLOOD PRESSURE: 128 MMHG | HEART RATE: 64 BPM | DIASTOLIC BLOOD PRESSURE: 84 MMHG

## 2018-01-15 NOTE — RESULT NOTES
Verified Results  (1) TSH [id number] 09:14AM Delorise Slim Order Number: [id number]   Order Number: [id number]     Test Name Result Flag Reference   TSH 7 182 uIU/mL H 0 358-3 740   The recommended reference ranges for TSH during pregnancy are as follows:  First trimester 0 1 to 2 5 uIU/mL  Second trimester  0 2 to 3 0 uIU/mL  Third trimester 0 3 to 3 0 uIU/m

## 2018-01-15 NOTE — RESULT NOTES
Verified Results  (1) CBC/PLT/DIFF [id number] 11:17AM Leah Angeles     Test Name Result Flag Reference   WBC COUNT 6 21 Thousand/uL  4 31-10 16   RBC COUNT 4 98 Million/uL  3 81-5 12   HEMOGLOBIN 15 2 g/dL  11 5-15 4   HEMATOCRIT 44 0 %  34 8-46  1   MCV 88 fL  82-98   MCH 30 5 pg  26 8-34 3   MCHC 34 5 g/dL  31 4-37 4   RDW 14 2 %  11 6-15 1   MPV 12 6 fL  8 9-12 7   PLATELET COUNT 523 Thousands/uL  149-390   NEUTROPHILS RELATIVE PERCENT 55 %  43-75   LYMPHOCYTES RELATIVE PERCENT 30 %  14-44   MONOCYTES RELATIVE PERCENT 10 %  4-12   EOSINOPHILS RELATIVE PERCENT 4 %  0-6   BASOPHILS RELATIVE PERCENT 1 %  0-1   NEUTROPHILS ABSOLUTE COUNT 3 45 Thousands/? ??L  1 85-7 62   LYMPHOCYTES ABSOLUTE COUNT 1 85 Thousands/? ??L  0 60-4 47   MONOCYTES ABSOLUTE COUNT 0 64 Thousand/? ??L  0 17-1 22   EOSINOPHILS ABSOLUTE COUNT 0 23 Thousand/? ??L  0 00-0 61   BASOPHILS ABSOLUTE COUNT 0 04 Thousands/? ??L  0 00-0 10   This bloodwork is non-fasting  Please drink two glasses of water morning of  bloodwork  (1) TSH [id number] 11:17AM Leah Angeles     Test Name Result Flag Reference   TSH 0 583 uIU/mL  0 358-3 740   This bloodwork is non-fasting  Please drink two glasses of water morning of  bloodwork  Patients undergoing fluorescein dye angiography may retain small amounts of fluorescein in the body for 48-72 hours post procedure  Samples containing fluorescein can produce falsely depressed TSH values  If the patient had this procedure,a specimen should be resubmitted post fluorescein clearance            The recommended reference ranges for TSH during pregnancy are as follows:  First trimester 0 1 to 2 5 uIU/mL  Second trimester  0 2 to 3 0 uIU/mL  Third trimester 0 3 to 3 0 uIU/m     (1) COMPREHENSIVE METABOLIC PANEL [id number] 27:07PG Leah Angeles     Test Name Result Flag Reference   SODIUM 139 mmol/L  136-145   POTASSIUM 3 9 mmol/L  3 5-5 3   CHLORIDE 102 mmol/L  100-108   CARBON DIOXIDE 30 mmol/L  21-32   ANION GAP (CALC) 7 mmol/L  4-13   BLOOD UREA NITROGEN 11 mg/dL  5-25   CREATININE 0 88 mg/dL  0 60-1 30   Standardized to IDMS reference method   CALCIUM 9 4 mg/dL  8 3-10 1   BILI, TOTAL 0 48 mg/dL  0 20-1 00   ALK PHOSPHATAS 52 U/L     ALT (SGPT) 28 U/L  12-78   Specimen collection should occur prior to Sulfasalazine administration due to the potential for falsely depressed results  AST(SGOT) 17 U/L  5-45   Specimen collection should occur prior to Sulfasalazine administration due to the potential for falsely depressed results  ALBUMIN 3 6 g/dL  3 5-5 0   TOTAL PROTEIN 7 0 g/dL  6 4-8 2   eGFR 75 ml/min/1 73sq [facility] Disease Education Program recommendations are as follows:  GFR calculation is accurate only with a steady state creatinine  Chronic Kidney disease less than 60 ml/min/1 73 sq  meters  Kidney failure less than 15 ml/min/1 73 sq  meters  GLUCOSE FASTING 145 mg/dL H 65-99   Specimen collection should occur prior to Sulfasalazine administration due to the potential for falsely depressed results  Specimen collection should occur prior to Sulfapyridine administration due to the potential for falsely elevated results

## 2018-01-16 NOTE — RESULT NOTES
Verified Results  (1) TSH [id number] 11:14AM Ada Vasquez Order Number: [id number]     Test Name Result Flag Reference   TSH 0 368 uIU/mL  0 358-3 740   Patients undergoing fluorescein dye angiography may retain small amounts of fluorescein in the body for 48-72 hours post procedure  Samples containing fluorescein can produce falsely depressed TSH values  If the patient had this procedure,a specimen should be resubmitted post fluorescein clearance            The recommended reference ranges for TSH during pregnancy are as follows:  First trimester 0 1 to 2 5 uIU/mL  Second trimester  0 2 to 3 0 uIU/mL  Third trimester 0 3 to 3 0 uIU/m

## 2018-02-21 ENCOUNTER — APPOINTMENT (EMERGENCY)
Dept: RADIOLOGY | Facility: HOSPITAL | Age: 53
End: 2018-02-21
Payer: COMMERCIAL

## 2018-02-21 ENCOUNTER — HOSPITAL ENCOUNTER (EMERGENCY)
Facility: HOSPITAL | Age: 53
Discharge: HOME | End: 2018-02-21
Attending: EMERGENCY MEDICINE | Admitting: EMERGENCY MEDICINE
Payer: COMMERCIAL

## 2018-02-21 VITALS
BODY MASS INDEX: 23.56 KG/M2 | TEMPERATURE: 97.5 F | WEIGHT: 120 LBS | OXYGEN SATURATION: 96 % | HEART RATE: 61 BPM | HEIGHT: 60 IN | SYSTOLIC BLOOD PRESSURE: 121 MMHG | DIASTOLIC BLOOD PRESSURE: 71 MMHG | RESPIRATION RATE: 18 BRPM

## 2018-02-21 DIAGNOSIS — R05.9 COUGH: Primary | ICD-10-CM

## 2018-02-21 DIAGNOSIS — R68.89 FLU-LIKE SYMPTOMS: ICD-10-CM

## 2018-02-21 LAB
ALBUMIN SERPL BCP-MCNC: 3.6 G/DL (ref 3.5–5)
ALP SERPL-CCNC: 70 U/L (ref 46–116)
ALT SERPL W P-5'-P-CCNC: 18 U/L (ref 12–78)
ANION GAP SERPL CALCULATED.3IONS-SCNC: 10 MMOL/L (ref 4–13)
AST SERPL W P-5'-P-CCNC: 12 U/L (ref 5–45)
ATRIAL RATE: 55 BPM
BACTERIA UR QL AUTO: ABNORMAL /HPF
BASOPHILS # BLD AUTO: 0.07 THOUSANDS/ΜL (ref 0–0.1)
BASOPHILS NFR BLD AUTO: 1 % (ref 0–1)
BILIRUB DIRECT SERPL-MCNC: 0.14 MG/DL (ref 0–0.2)
BILIRUB SERPL-MCNC: 0.54 MG/DL (ref 0.2–1)
BILIRUB UR QL STRIP: ABNORMAL
BUN SERPL-MCNC: 17 MG/DL (ref 5–25)
CALCIUM SERPL-MCNC: 9.3 MG/DL (ref 8.3–10.1)
CHLORIDE SERPL-SCNC: 103 MMOL/L (ref 100–108)
CLARITY UR: ABNORMAL
CO2 SERPL-SCNC: 28 MMOL/L (ref 21–32)
COLOR UR: YELLOW
COLOR, POC: YELLOW
CREAT SERPL-MCNC: 0.8 MG/DL (ref 0.6–1.3)
EOSINOPHIL # BLD AUTO: 0.2 THOUSAND/ΜL (ref 0–0.61)
EOSINOPHIL NFR BLD AUTO: 2 % (ref 0–6)
ERYTHROCYTE [DISTWIDTH] IN BLOOD BY AUTOMATED COUNT: 14.9 % (ref 11.6–15.1)
GFR SERPL CREATININE-BSD FRML MDRD: 84 ML/MIN/1.73SQ M
GLUCOSE SERPL-MCNC: 134 MG/DL (ref 65–140)
GLUCOSE UR STRIP-MCNC: NEGATIVE MG/DL
HCT VFR BLD AUTO: 43.9 % (ref 34.8–46.1)
HGB BLD-MCNC: 15.4 G/DL (ref 11.5–15.4)
HGB UR QL STRIP.AUTO: ABNORMAL
KETONES UR STRIP-MCNC: NEGATIVE MG/DL
LEUKOCYTE ESTERASE UR QL STRIP: ABNORMAL
LYMPHOCYTES # BLD AUTO: 2.05 THOUSANDS/ΜL (ref 0.6–4.47)
LYMPHOCYTES NFR BLD AUTO: 16 % (ref 14–44)
MAGNESIUM SERPL-MCNC: 2.4 MG/DL (ref 1.6–2.6)
MCH RBC QN AUTO: 31.9 PG (ref 26.8–34.3)
MCHC RBC AUTO-ENTMCNC: 35.1 G/DL (ref 31.4–37.4)
MCV RBC AUTO: 91 FL (ref 82–98)
MONOCYTES # BLD AUTO: 1.28 THOUSAND/ΜL (ref 0.17–1.22)
MONOCYTES NFR BLD AUTO: 10 % (ref 4–12)
NEUTROPHILS # BLD AUTO: 9.08 THOUSANDS/ΜL (ref 1.85–7.62)
NEUTS SEG NFR BLD AUTO: 71 % (ref 43–75)
NITRITE UR QL STRIP: NEGATIVE
NON-SQ EPI CELLS URNS QL MICRO: ABNORMAL /HPF
NRBC BLD AUTO-RTO: 0 /100 WBCS
P AXIS: 66 DEGREES
PH UR STRIP.AUTO: 5.5 [PH] (ref 4.5–8)
PLATELET # BLD AUTO: 251 THOUSANDS/UL (ref 149–390)
PMV BLD AUTO: 12.9 FL (ref 8.9–12.7)
POTASSIUM SERPL-SCNC: 3.6 MMOL/L (ref 3.5–5.3)
PR INTERVAL: 152 MS
PROT SERPL-MCNC: 7.5 G/DL (ref 6.4–8.2)
PROT UR STRIP-MCNC: ABNORMAL MG/DL
QRS AXIS: 81 DEGREES
QRSD INTERVAL: 78 MS
QT INTERVAL: 424 MS
QTC INTERVAL: 405 MS
RBC # BLD AUTO: 4.83 MILLION/UL (ref 3.81–5.12)
RBC #/AREA URNS AUTO: ABNORMAL /HPF
SODIUM SERPL-SCNC: 141 MMOL/L (ref 136–145)
SP GR UR STRIP.AUTO: 1.02 (ref 1–1.03)
T WAVE AXIS: 72 DEGREES
TROPONIN I SERPL-MCNC: <0.02 NG/ML
UROBILINOGEN UR QL STRIP.AUTO: 0.2 E.U./DL
VENTRICULAR RATE: 55 BPM
WBC # BLD AUTO: 12.68 THOUSAND/UL (ref 4.31–10.16)
WBC #/AREA URNS AUTO: ABNORMAL /HPF

## 2018-02-21 PROCEDURE — 99284 EMERGENCY DEPT VISIT MOD MDM: CPT

## 2018-02-21 PROCEDURE — 83735 ASSAY OF MAGNESIUM: CPT | Performed by: EMERGENCY MEDICINE

## 2018-02-21 PROCEDURE — 85025 COMPLETE CBC W/AUTO DIFF WBC: CPT | Performed by: EMERGENCY MEDICINE

## 2018-02-21 PROCEDURE — 93010 ELECTROCARDIOGRAM REPORT: CPT | Performed by: INTERNAL MEDICINE

## 2018-02-21 PROCEDURE — 81002 URINALYSIS NONAUTO W/O SCOPE: CPT | Performed by: EMERGENCY MEDICINE

## 2018-02-21 PROCEDURE — 94640 AIRWAY INHALATION TREATMENT: CPT

## 2018-02-21 PROCEDURE — 71046 X-RAY EXAM CHEST 2 VIEWS: CPT

## 2018-02-21 PROCEDURE — 36415 COLL VENOUS BLD VENIPUNCTURE: CPT | Performed by: EMERGENCY MEDICINE

## 2018-02-21 PROCEDURE — 80048 BASIC METABOLIC PNL TOTAL CA: CPT | Performed by: EMERGENCY MEDICINE

## 2018-02-21 PROCEDURE — 96360 HYDRATION IV INFUSION INIT: CPT

## 2018-02-21 PROCEDURE — 81001 URINALYSIS AUTO W/SCOPE: CPT

## 2018-02-21 PROCEDURE — 93005 ELECTROCARDIOGRAM TRACING: CPT | Performed by: EMERGENCY MEDICINE

## 2018-02-21 PROCEDURE — 80076 HEPATIC FUNCTION PANEL: CPT | Performed by: EMERGENCY MEDICINE

## 2018-02-21 PROCEDURE — 84484 ASSAY OF TROPONIN QUANT: CPT | Performed by: EMERGENCY MEDICINE

## 2018-02-21 RX ORDER — ACETAMINOPHEN 325 MG/1
650 TABLET ORAL ONCE
Status: COMPLETED | OUTPATIENT
Start: 2018-02-21 | End: 2018-02-21

## 2018-02-21 RX ORDER — ONDANSETRON 4 MG/1
4 TABLET, ORALLY DISINTEGRATING ORAL EVERY 6 HOURS PRN
Qty: 20 TABLET | Refills: 0 | Status: SHIPPED | OUTPATIENT
Start: 2018-02-21 | End: 2018-03-10 | Stop reason: SDUPTHER

## 2018-02-21 RX ADMIN — SODIUM CHLORIDE 1000 ML: 0.9 INJECTION, SOLUTION INTRAVENOUS at 09:07

## 2018-02-21 RX ADMIN — ALBUTEROL SULFATE 5 MG: 2.5 SOLUTION RESPIRATORY (INHALATION) at 09:02

## 2018-02-21 RX ADMIN — ACETAMINOPHEN 650 MG: 325 TABLET, FILM COATED ORAL at 09:00

## 2018-02-21 RX ADMIN — IPRATROPIUM BROMIDE 0.5 MG: 0.5 SOLUTION RESPIRATORY (INHALATION) at 09:02

## 2018-02-21 NOTE — ED PROVIDER NOTES
History  Chief Complaint   Patient presents with    Cough     patient reports productive cough, sore throat, nausea, chest pain with coughing  symptoms began friday  unsure if having fevers  + ill contacts at home  44 YO female presents with flu like symptoms for the last 5 days  Pt states this has been gradual in onset, constant, with coughing, runny nose, nasal congestion, sore throat, body aches, nasuea  Pt has not checked her temperature  Notes sick family at home  Pt did receive her flu vaccination this past year  She states she began with chest pain on coughing but now has constant chest pain for the last few days, states this continues to be worse with coughing  Pt has a Hx of asthma, states breathing tends to worsen when she has URI's  Pt denies SOB/F/C/N/V/D/C, no dysuria, burning on urination or blood in urine            History provided by:  Patient   used: No    Cough   Cough characteristics:  Productive  Sputum characteristics:  Nondescript  Severity:  Moderate  Onset quality:  Gradual  Duration:  5 days  Timing:  Constant  Progression:  Waxing and waning  Chronicity:  New  Smoker: yes    Context: upper respiratory infection    Relieved by:  Nothing  Worsened by:  Nothing  Ineffective treatments:  None tried  Associated symptoms: chest pain, myalgias, sinus congestion and sore throat    Associated symptoms: no chills, no fever, no rash and no shortness of breath    Chest pain:     Quality: aching      Severity:  Moderate    Onset quality:  Gradual    Duration:  5 days    Timing:  Constant    Progression:  Unchanged  Myalgias:     Location:  Generalized    Quality:  Aching    Severity:  Moderate    Onset quality:  Gradual    Duration:  5 days    Timing:  Constant    Progression:  Waxing and waning  Sore throat:     Severity:  Moderate    Onset quality:  Gradual    Duration:  5 days    Timing:  Intermittent    Progression:  Waxing and waning      None       Past Medical History: Diagnosis Date    Asthma     Disease of thyroid gland        Past Surgical History:   Procedure Laterality Date    SPLENECTOMY      TUBAL LIGATION         History reviewed  No pertinent family history  I have reviewed and agree with the history as documented  Social History   Substance Use Topics    Smoking status: Former Smoker    Smokeless tobacco: Never Used    Alcohol use No        Review of Systems   Constitutional: Negative for chills, fatigue and fever  HENT: Positive for sore throat  Negative for dental problem  Eyes: Negative for visual disturbance  Respiratory: Positive for cough  Negative for shortness of breath  Cardiovascular: Positive for chest pain  Gastrointestinal: Negative for abdominal pain, diarrhea and vomiting  Genitourinary: Negative for dysuria and frequency  Musculoskeletal: Positive for myalgias  Negative for arthralgias  Skin: Negative for rash  Neurological: Negative for dizziness, weakness and light-headedness  Psychiatric/Behavioral: Negative for agitation, behavioral problems and confusion  All other systems reviewed and are negative  Physical Exam  ED Triage Vitals   Temperature Pulse Respirations Blood Pressure SpO2   02/21/18 0655 02/21/18 0655 02/21/18 0655 02/21/18 0655 02/21/18 0655   97 5 °F (36 4 °C) 75 18 133/80 95 %      Temp Source Heart Rate Source Patient Position - Orthostatic VS BP Location FiO2 (%)   02/21/18 0655 02/21/18 0655 02/21/18 0904 02/21/18 0655 --   Oral Monitor Lying Right arm       Pain Score       02/21/18 0655       8           Orthostatic Vital Signs  Vitals:    02/21/18 0655 02/21/18 0904 02/21/18 1047   BP: 133/80 140/76 121/71   Pulse: 75 55 61   Patient Position - Orthostatic VS:  Lying Lying       Physical Exam   Constitutional: She is oriented to person, place, and time  She appears well-developed and well-nourished  HENT:   Head: Normocephalic and atraumatic     Eyes: EOM are normal    Neck: Normal range of motion  Cardiovascular: Normal rate, regular rhythm and normal heart sounds  Pulmonary/Chest: Effort normal and breath sounds normal    Abdominal: Soft  There is no tenderness  Musculoskeletal: Normal range of motion  Neurological: She is alert and oriented to person, place, and time  Skin: Skin is warm and dry  Psychiatric: She has a normal mood and affect  Her behavior is normal  Thought content normal    Nursing note and vitals reviewed        ED Medications  Medications   sodium chloride 0 9 % bolus 1,000 mL (0 mL Intravenous Stopped 2/21/18 1007)   ipratropium (ATROVENT) 0 02 % inhalation solution 0 5 mg (0 5 mg Nebulization Given 2/21/18 0902)   albuterol inhalation solution 5 mg (5 mg Nebulization Given 2/21/18 0902)   acetaminophen (TYLENOL) tablet 650 mg (650 mg Oral Given 2/21/18 0900)       Diagnostic Studies  Results Reviewed     Procedure Component Value Units Date/Time    Urine Microscopic [[id number]]  (Abnormal) Collected:  02/21/18 1049    Lab Status:  Final result Specimen:  Urine from Urine, Clean Catch Updated:  02/21/18 1112     RBC, UA None Seen /hpf      WBC, UA 2-4 (A) /hpf      Epithelial Cells Moderate (A) /hpf      Bacteria, UA Occasional /hpf     POCT urinalysis dipstick [[id number]]  (Normal) Resulted:  02/21/18 1048    Lab Status:  Final result Specimen:  Urine Updated:  02/21/18 1051     Color, UA yellow    ED Urine Macroscopic [[id number]]  (Abnormal) Collected:  02/21/18 1049    Lab Status:  Final result Specimen:  Urine Updated:  02/21/18 1048     Color, UA Yellow     Clarity, UA Cloudy     pH, UA 5 5     Leukocytes, UA Trace (A)     Nitrite, UA Negative     Protein, UA 30 (1+) (A) mg/dl      Glucose, UA Negative mg/dl      Ketones, UA Negative mg/dl      Urobilinogen, UA 0 2 E U /dl      Bilirubin, UA Interference- unable to analyze (A)     Blood, UA Small (A)     Specific Mora, UA 1 025    Narrative:       CLINITEK RESULT    Troponin I [[id number]]  (Normal) Collected: 02/21/18 0859    Lab Status:  Final result Specimen:  Blood from Arm, Right Updated:  02/21/18 0934     Troponin I <0 02 ng/mL     Narrative:         [employer] Chemistry analyzer 99% cutoff is > 0 04 ng/mL in network labs    o cTnI 99% cutoff is useful only when applied to patients in the clinical setting of myocardial ischemia  o cTnI 99% cutoff should be interpreted in the context of clinical history, ECG findings and possibly cardiac imaging to establish correct diagnosis  o cTnI 99% cutoff may be suggestive but clearly not indicative of a coronary event without the clinical setting of myocardial ischemia  Basic metabolic panel [[id number]] Collected:  02/21/18 0859    Lab Status:  Final result Specimen:  Blood from Arm, Right Updated:  02/21/18 0932     Sodium 141 mmol/L      Potassium 3 6 mmol/L      Chloride 103 mmol/L      CO2 28 mmol/L      Anion Gap 10 mmol/L      BUN 17 mg/dL      Creatinine 0 80 mg/dL      Glucose 134 mg/dL      Calcium 9 3 mg/dL      eGFR 84 ml/min/1 73sq m     Narrative:         National Kidney Disease Education Program recommendations are as follows:  GFR calculation is accurate only with a steady state creatinine  Chronic Kidney disease less than 60 ml/min/1 73 sq  meters  Kidney failure less than 15 ml/min/1 73 sq  meters      Hepatic function panel [[id number]]  (Normal) Collected:  02/21/18 0859    Lab Status:  Final result Specimen:  Blood from Arm, Right Updated:  02/21/18 0932     Total Bilirubin 0 54 mg/dL      Bilirubin, Direct 0 14 mg/dL      Alkaline Phosphatase 70 U/L      AST 12 U/L      ALT 18 U/L      Total Protein 7 5 g/dL      Albumin 3 6 g/dL     Magnesium [[id number]]  (Normal) Collected:  02/21/18 0859    Lab Status:  Final result Specimen:  Blood from Arm, Right Updated:  02/21/18 0931     Magnesium 2 4 mg/dL     CBC and differential [[id number]]  (Abnormal) Collected:  02/21/18 0859    Lab Status:  Final result Specimen:  Blood from Arm, Right Updated:  02/21/18 5419 WBC 12 68 (H) Thousand/uL      RBC 4 83 Million/uL      Hemoglobin 15 4 g/dL      Hematocrit 43 9 %      MCV 91 fL      MCH 31 9 pg      MCHC 35 1 g/dL      RDW 14 9 %      MPV 12 9 (H) fL      Platelets 991 Thousands/uL      nRBC 0 /100 WBCs      Neutrophils Relative 71 %      Lymphocytes Relative 16 %      Monocytes Relative 10 %      Eosinophils Relative 2 %      Basophils Relative 1 %      Neutrophils Absolute 9 08 (H) Thousands/µL      Lymphocytes Absolute 2 05 Thousands/µL      Monocytes Absolute 1 28 (H) Thousand/µL      Eosinophils Absolute 0 20 Thousand/µL      Basophils Absolute 0 07 Thousands/µL                  XR chest 2 views   Final Result by Kady Parr MD (02/21 1106)      No acute cardiopulmonary disease  Workstation performed: [id number]                    Procedures  ECG 12 Lead Documentation  Date/Time: 2/21/2018 9:01 AM  Performed by: Danyelle Woodson  Authorized by: Daksha HOLLOWAY     ECG reviewed by me, the ED Provider: yes    Patient location:  ED  Previous ECG:     Previous ECG:  Compared to current    Comparison ECG info:  5/26/2017    Similarity:  No change  Interpretation:     Interpretation: normal    Rate:     ECG rate:  55    ECG rate assessment: bradycardic    Rhythm:     Rhythm: sinus rhythm and sinus bradycardia    QRS:     QRS axis:  Normal    QRS intervals:  Normal  Conduction:     Conduction: normal    ST segments:     ST segments:  Normal  T waves:     T waves: normal             Phone Contacts  ED Phone Contact    ED Course  ED Course                                MDM  Number of Diagnoses or Management Options  Cough: new and does not require workup  Flu-like symptoms: new and does not require workup  Diagnosis management comments: 1  Chest pain - Pt with cough associated with URI, possibly flu, for the last 5 days  Will check CXR for PNA, ECG and single troponin as pt has had constant chest pain, electrolytes, CBC  Will give fluids         Amount and/or Complexity of Data Reviewed  Clinical lab tests: ordered and reviewed  Tests in the radiology section of CPT®: ordered and reviewed  Independent visualization of images, tracings, or specimens: yes    Patient Progress  Patient progress: stable    CritCare Time    Disposition  Final diagnoses:   Cough   Flu-like symptoms     Time reflects when diagnosis was documented in both MDM as applicable and the Disposition within this note     Time User Action Codes Description Comment    2/21/2018 11:18 AM Derral Cheyenne E Add [R05] Cough     2/21/2018 11:18 AM Derral Cheyenne JEWEL Add [R68 89] Flu-like symptoms       ED Disposition     ED Disposition Condition Comment    Discharge  Cinderella Rough discharge to home/self care  Condition at discharge: Stable        Follow-up Information     Follow up With Specialties Details Why Contact Boubacar Jackson DO Family Medicine Schedule an appointment as soon as possible for a visit  [address] [place] [address]  [phone]          Discharge Medication List as of 2/21/2018 11:20 AM      START taking these medications    Details   guaiFENesin (ROBITUSSIN) 100 MG/5ML oral liquid Take 5-10 mL (100-200 mg total) by mouth every 4 (four) hours as needed for cough, Starting Wed 2/21/2018, Print      ondansetron (ZOFRAN-ODT) 4 mg disintegrating tablet Take 1 tablet (4 mg total) by mouth every 6 (six) hours as needed for nausea or vomiting, Starting Wed 2/21/2018, Print           No discharge procedures on file      ED Provider  Electronically Signed by           Kishor Keenan MD  02/22/18 8843

## 2018-02-21 NOTE — DISCHARGE INSTRUCTIONS
You can try the guaifenesin for coughing  Take the zofran as needed for nausea, this can be placed under the tongue to dissolve if you are vomiting  Rest and drink plenty of fluids to avoid dehydration  Call your family doctor, you should be seen in the office to make sure your symptoms are improving  Acute Cough   WHAT YOU NEED TO KNOW:   An acute cough can last up to 3 weeks  Common causes of an acute cough include a cold, allergies, or a lung infection  DISCHARGE INSTRUCTIONS:   Return to the emergency department if:   · You have trouble breathing or feel short of breath  · You cough up blood, or you see blood in your mucus  · You faint or feel weak or dizzy  · You have chest pain when you cough or take a deep breath  · You have new wheezing  Contact your healthcare provider if:   · You have a fever  · Your cough lasts longer than 4 weeks  · Your symptoms do not improve with treatment  · You have questions or concerns about your condition or care  Medicines:   · Medicines  may be needed to stop the cough, decrease swelling in your airways, or help open your airways  Medicine may also be given to help you cough up mucus  Ask your healthcare provider what over-the-counter medicines you can take  If you have an infection caused by bacteria, you may need antibiotics  · Take your medicine as directed  Contact your healthcare provider if you think your medicine is not helping or if you have side effects  Tell him or her if you are allergic to any medicine  Keep a list of the medicines, vitamins, and herbs you take  Include the amounts, and when and why you take them  Bring the list or the pill bottles to follow-up visits  Carry your medicine list with you in case of an emergency  Manage your symptoms:   · Do not smoke and stay away from others who smoke  Nicotine and other chemicals in cigarettes and cigars can cause lung damage and make your cough worse   Ask your healthcare provider for information if you currently smoke and need help to quit  E-cigarettes or smokeless tobacco still contain nicotine  Talk to your healthcare provider before you use these products  · Drink extra liquids as directed  Liquids will help thin and loosen mucus so you can cough it up  Liquids will also help prevent dehydration  Examples of good liquids to drink include water, fruit juice, and broth  Do not drink liquids that contain caffeine  Caffeine can increase your risk for dehydration  Ask your healthcare provider how much liquid to drink each day  · Rest as directed  Do not do activities that make your cough worse, such as exercise  · Use a humidifier or vaporizer  Use a cool mist humidifier or a vaporizer to increase air moisture in your home  This may make it easier for you to breathe and help decrease your cough  · Eat 2 to 5 mL of honey 2 times each day  Honey can help thin mucus and decrease your cough  · Use cough drops or lozenges  These can help decrease throat irritation and your cough  Follow up with your healthcare provider as directed:  Write down your questions so you remember to ask them during your visits  © 2017 [facility] Information is for End User's use only and may not be sold, redistributed or otherwise used for commercial purposes  All illustrations and images included in CareNotes® are the copyrighted property of A D A M , Inc  or [employer]  The above information is an  only  It is not intended as medical advice for individual conditions or treatments  Talk to your doctor, nurse or pharmacist before following any medical regimen to see if it is safe and effective for you  Influenza   WHAT YOU NEED TO KNOW:   Influenza (the flu) is an infection caused by the influenza virus  The flu is easily spread when an infected person coughs, sneezes, or has close contact with others   You may be able to spread the flu to others for 1 week or longer after signs or symptoms appear  DISCHARGE INSTRUCTIONS:   Call 911 for any of the following:   · You have trouble breathing, and your lips look purple or blue  · You have a seizure  Return to the emergency department if:   · You are dizzy, or you are urinating less or not at all  · You have a headache with a stiff neck, and you feel tired or confused  · You have new pain or pressure in your chest     · Your symptoms, such as shortness of breath, vomiting, or diarrhea, get worse  · Your symptoms, such as fever and coughing, seem to get better, but then get worse  Contact your healthcare provider if:   · You have new muscle pain or weakness  · You have questions or concerns about your condition or care  Medicines: You may need any of the following:  · Acetaminophen  decreases pain and fever  It is available without a doctor's order  Ask how much to take and how often to take it  Follow directions  Acetaminophen can cause liver damage if not taken correctly  · NSAIDs , such as ibuprofen, help decrease swelling, pain, and fever  This medicine is available with or without a doctor's order  NSAIDs can cause stomach bleeding or kidney problems in certain people  If you take blood thinner medicine, always ask your healthcare provider if NSAIDs are safe for you  Always read the medicine label and follow directions  · Antivirals  help fight a viral infection  · Take your medicine as directed  Contact your healthcare provider if you think your medicine is not helping or if you have side effects  Tell him or her if you are allergic to any medicine  Keep a list of the medicines, vitamins, and herbs you take  Include the amounts, and when and why you take them  Bring the list or the pill bottles to follow-up visits  Carry your medicine list with you in case of an emergency  Rest  as much as you can to help you recover    Drink liquids as directed  to help prevent dehydration  Ask how much liquid to drink each day and which liquids are best for you  Prevent the spread of influenza:   · Wash your hands often  Use soap and water  Wash your hands after you use the bathroom, change a child's diapers, or sneeze  Wash your hands before you prepare or eat food  Use gel hand cleanser when soap and water are not available  Do not touch your eyes, nose, or mouth unless you have washed your hands first            · Cover your mouth when you sneeze or cough  Cough into a tissue or the bend of your arm  · Clean shared items with a germ-killing   Clean table surfaces, doorknobs, and light switches  Do not share towels, silverware, and dishes with people who are sick  Wash bed sheets, towels, silverware, and dishes with soap and water  · Wear a mask  over your mouth and nose if you are sick or are near anyone who is sick  · Stay away from others  if you are sick  · Influenza vaccine  helps prevent influenza (flu)  Everyone older than 6 months should get a yearly influenza vaccine  Get the vaccine as soon as it is available, usually in September or October each year  Follow up with your healthcare provider as directed:  Write down your questions so you remember to ask them during your visits  © 2017 [address] [employer]  Information is for End User's use only and may not be sold, redistributed or otherwise used for commercial purposes  All illustrations and images included in CareNotes® are the copyrighted property of A D A M , Inc  or [employer]  The above information is an  only  It is not intended as medical advice for individual conditions or treatments  Talk to your doctor, nurse or pharmacist before following any medical regimen to see if it is safe and effective for you

## 2018-03-07 NOTE — PROGRESS NOTES
History of Present Illness    Revaccination   Vaccine Information: Vaccine(s) Given (names): menactra 9/8/2015  Spoke with patient regarding risks and benefits of revaccination  Action(s): Pt will be revaccinated  Appointment scheduled: [phone] pp  Pt called (attempt 1): [phone] pp  Other Information: [phone] Awaiting task from Dr Namrata Perez to see if ok to give revaccination of 1st menactra, may l/m with mom or dad with date for revaccination  pp    [facility] and will reschedule 3/7/17 [email or web address]  Revaccination Completed: [id number]  Active Problems    1  Allergic rhinitis (477 9) (J30 9)   2  Asthma (493 90) (J45 909)   3  Dysmenorrhea (625 3) (N94 6)   4  Encounter for screening colonoscopy (V76 51) (Z12 11)   5  Encounter for screening mammogram for breast cancer (V76 12) (Z12 31)   6  History of ITP (V12 3) (Z86 2)   7  Hypothyroidism (244 9) (E03 9)   8  Ingrowing nail (703 0) (L60 0)   9  Leiomyoma of uterus (218 9) (D25 9)   10  Need for revaccination (V05 9) (Z23)   11  History of Splenectomy    Immunizations  HIB --- Verl Nones: 23-Feb-2011   Influenza --- Verl Nones: 12-Nov-2015   Meningococcal --- Series1: 08-Sep-2015   PPSV --- Series1: 23-Feb-2011     Current Meds   1  Cetirizine HCl - 10 MG Oral Tablet; take 1 tablet every day only as needed re allergies   2  Flovent  MCG/ACT Inhalation Aerosol; Inhale 2 puffs twice a day   3  Fluticasone Propionate 50 MCG/ACT Nasal Suspension; USE 2 SPRAYS IN EACH   NOSTRIL ONCE DAILY   4  Levothyroxine Sodium 125 MCG Oral Tablet; take 1 tablet every day   5  Montelukast Sodium 10 MG Oral Tablet; TAKE 1 TABLET DAILY   6  Ventolin  (90 Base) MCG/ACT Inhalation Aerosol Solution; INHALE 2 PUFFS 3   TIMES A DAY only AS NEEDED    Allergies    1  Aspirin Adult Low Strength TBEC   2   Ibuprofen IB TABS    Future Appointments    Date/Time Provider Specialty Site   03/07/2017 10:00 AM Daryl Palomares DO Family Medicine [address] FAMILY MED[facility]     Signatures   Electronically signed by : Karen Reyez DO; Oct 13 2017 11:16AM EST                       (Author)

## 2018-03-10 PROBLEM — Z86.2 HISTORY OF ITP: Status: ACTIVE | Noted: 2018-03-10

## 2018-03-10 PROBLEM — Q89.01 ASPLENIA: Status: ACTIVE | Noted: 2017-09-12

## 2018-03-10 PROBLEM — D72.829 LEUKOCYTOSIS: Status: ACTIVE | Noted: 2017-03-20

## 2018-03-10 RX ORDER — CETIRIZINE HYDROCHLORIDE 10 MG/1
10 TABLET ORAL DAILY
COMMUNITY
Start: 2015-02-27

## 2018-03-10 RX ORDER — MONTELUKAST SODIUM 10 MG/1
1 TABLET ORAL DAILY
COMMUNITY
Start: 2016-09-07 | End: 2018-03-15 | Stop reason: ALTCHOICE

## 2018-03-10 RX ORDER — LEVOTHYROXINE SODIUM 0.12 MG/1
1 TABLET ORAL DAILY
COMMUNITY
Start: 2014-08-28 | End: 2018-10-02 | Stop reason: SDUPTHER

## 2018-03-10 RX ORDER — FLUTICASONE PROPIONATE 110 UG/1
2 AEROSOL, METERED RESPIRATORY (INHALATION) 2 TIMES DAILY PRN
COMMUNITY
Start: 2015-09-08 | End: 2018-10-02 | Stop reason: SDUPTHER

## 2018-03-10 RX ORDER — FLUTICASONE PROPIONATE 50 MCG
2 SPRAY, SUSPENSION (ML) NASAL DAILY
COMMUNITY
Start: 2016-04-04 | End: 2018-10-02 | Stop reason: SDUPTHER

## 2018-03-10 RX ORDER — ALBUTEROL SULFATE 90 UG/1
2 AEROSOL, METERED RESPIRATORY (INHALATION) EVERY 4 HOURS PRN
COMMUNITY
Start: 2015-03-23 | End: 2018-10-02 | Stop reason: SDUPTHER

## 2018-03-15 ENCOUNTER — APPOINTMENT (EMERGENCY)
Dept: RADIOLOGY | Facility: HOSPITAL | Age: 53
End: 2018-03-15
Payer: COMMERCIAL

## 2018-03-15 ENCOUNTER — HOSPITAL ENCOUNTER (EMERGENCY)
Facility: HOSPITAL | Age: 53
Discharge: HOME | End: 2018-03-15
Payer: COMMERCIAL

## 2018-03-15 VITALS
TEMPERATURE: 97.8 F | RESPIRATION RATE: 18 BRPM | DIASTOLIC BLOOD PRESSURE: 77 MMHG | WEIGHT: 120 LBS | HEART RATE: 70 BPM | BODY MASS INDEX: 23.44 KG/M2 | OXYGEN SATURATION: 97 % | SYSTOLIC BLOOD PRESSURE: 124 MMHG

## 2018-03-15 DIAGNOSIS — S83.92XA LEFT KNEE SPRAIN: Primary | ICD-10-CM

## 2018-03-15 PROCEDURE — 99283 EMERGENCY DEPT VISIT LOW MDM: CPT

## 2018-03-15 PROCEDURE — 73564 X-RAY EXAM KNEE 4 OR MORE: CPT

## 2018-03-15 RX ORDER — LIDOCAINE 50 MG/G
1 PATCH TOPICAL ONCE
Status: DISCONTINUED | OUTPATIENT
Start: 2018-03-15 | End: 2018-03-15

## 2018-03-15 RX ORDER — ACETAMINOPHEN 500 MG
1000 TABLET ORAL EVERY 6 HOURS PRN
Qty: 30 TABLET | Refills: 0 | Status: SHIPPED | OUTPATIENT
Start: 2018-03-15 | End: 2020-06-12 | Stop reason: SDUPTHER

## 2018-03-15 NOTE — ED PROVIDER NOTES
History  Chief Complaint   Patient presents with    Knee Injury     Pt states she had a fall yesterday and injured left knee  Pt states the knee is swollen and is throbbing  Pt states taking tylenol at 1000 with no relief  Pt able to bear weight on leg       53-year-old female presents with a left knee injury following a fall that occurred last evening now approximately 18 hours ago  The pain is constant, was sudden in onset after falling down for endorse that is inside her home she fell directly onto the left anterior knee  She has constant pain in the medial portion of the left knee, does not radiate  She took Tylenol 650 mg at 10 o'clock this morning without any change or relief  She denies prior injury or surgeries to this lower extremity  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 Taking?    albuterol (VENTOLIN HFA) 90 mcg/act inhaler   Yes Yes   Si puffs every 4 (four) hours as needed   cetirizine (ZyrTEC) 10 mg tablet   Yes Yes   Sig: Take 10 mg by mouth daily     fluticasone (FLONASE) 50 mcg/act nasal spray   Yes Yes   Si sprays into each nostril daily   fluticasone (FLOVENT HFA) 110 MCG/ACT inhaler   Yes Yes   Sig: Inhale 2 puffs 2 (two) times a day as needed     levothyroxine 125 mcg tablet   Yes Yes   Sig: Take 1 tablet by mouth daily      Facility-Administered Medications: None       Past Medical History:   Diagnosis Date    Accidental fall     LAST ASSESSED: [id number]    Asthma     Cellulitis of left thigh     LAST ASSESSED: [id number]    Dermatitis     LAST ASSESSED: [id number]    Disease of thyroid gland     History of ITP     LAST ASSESSED: [id number]    Ingrown nail     RESOLVED: [id number]    Personal history of ovarian cyst     Pneumonia     LAST ASSESSED: [id number]    Right cervical radiculopathy     LAST ASSESSED: [id number]       Past Surgical History:   Procedure Laterality Date    SPLENECTOMY  1998    TUBAL LIGATION         Family History   Problem Relation Age of Onset    Breast cancer Sister      MALIGNANT NEOPLASM    Diabetes Family      I have reviewed and agree with the history as documented  Social History   Substance Use Topics    Smoking status: Former Smoker    Smokeless tobacco: Never Used      Comment: QUIT AROUND AGE 46    Alcohol use No        Review of Systems   Constitutional: Negative for activity change, appetite change, chills, diaphoresis, fatigue, fever and unexpected weight change  HENT: Negative for congestion, ear pain, postnasal drip, rhinorrhea, sinus pressure and sore throat  Eyes: Negative for pain, discharge and redness  Respiratory: Negative for cough, chest tightness and shortness of breath  Cardiovascular: Negative for chest pain, palpitations and leg swelling  Gastrointestinal: Negative for abdominal pain, constipation, diarrhea, nausea and vomiting  Genitourinary: Negative for difficulty urinating, dysuria, flank pain, frequency, hematuria and urgency  Musculoskeletal: Positive for arthralgias, gait problem and joint swelling  Negative for back pain and myalgias  Skin: Negative for color change, rash and wound  Allergic/Immunologic: Negative for immunocompromised state  Neurological: Negative for dizziness, tremors, syncope, weakness, numbness and headaches         Physical Exam  ED Triage Vitals [03/15/18 1233]   Temperature Pulse Respirations Blood Pressure SpO2   97 8 °F (36 6 °C) 70 18 124/77 97 %      Temp Source Heart Rate Source Patient Position - Orthostatic VS BP Location FiO2 (%)   Oral Monitor Sitting Right arm --      Pain Score       Worst Possible Pain Orthostatic Vital Signs  Vitals:    03/15/18 1233   BP: 124/77   Pulse: 70   Patient Position - Orthostatic VS: Sitting       Physical Exam   Constitutional: She is oriented to person, place, and time  She appears well-developed and well-nourished  No distress  HENT:   Head: Normocephalic and atraumatic  Mouth/Throat: Oropharynx is clear and moist    Eyes: Pupils are equal, round, and reactive to light  Neck: Normal range of motion  Cardiovascular: Normal rate, regular rhythm, normal heart sounds and intact distal pulses  Pulmonary/Chest: Effort normal and breath sounds normal  No respiratory distress  She exhibits no tenderness  Musculoskeletal: Normal range of motion  She exhibits tenderness  She exhibits no edema or deformity  Left hip: Normal         Right knee: Normal         Left knee: She exhibits swelling (medial, minor  no large effusion)  She exhibits normal range of motion, no effusion, no ecchymosis, no deformity, no laceration, no erythema, normal alignment, no LCL laxity, normal patellar mobility, no bony tenderness, normal meniscus and no MCL laxity  Tenderness (medial) found  Medial joint line tenderness noted  No lateral joint line, no MCL, no LCL and no patellar tendon tenderness noted  Left ankle: Normal         Legs:  Neurological: She is alert and oriented to person, place, and time  Skin: Skin is warm and dry  Capillary refill takes less than 2 seconds  She is not diaphoretic  No erythema  No pallor  Psychiatric: She has a normal mood and affect  Nursing note and vitals reviewed  ED Medications  Medications - No data to display    Diagnostic Studies  Results Reviewed     None                 XR knee 4+ vw left injury   ED Interpretation by Soraida Eisenberg PA-C (03/15 6234)   No acute fracture or dislocation  No joint effusion  Images 1 and 3 show medial femoral condyle abnormality of uncertain significance without cortical disruption   Given site of pain will immobilize the joint  Final Result by Yulissa Willson MD (03/15 3049)      A few small calcific densities adjacent to the medial femoral condyle  This could be related to calcific tendinitis, avulsion fracture is not excluded  ER was aware of the findings at the time of dictation  Workstation performed: [id number]                    Procedures  Procedures   Splint applied by RN and/or ED Tech  CMS intact checked by me pre and post splint application  RIGHT KNEE IMMOBILIZER IN EXTENSION with CRUTCHES  Phone Contacts  ED Phone Contact    ED Course  ED Course        MDM  Number of Diagnoses or Management Options  Left knee sprain: new and does not require workup     Amount and/or Complexity of Data Reviewed  Tests in the radiology section of CPT®: ordered and reviewed  Review and summarize past medical records: yes  Independent visualization of images, tracings, or specimens: yes    Patient Progress  Patient progress: stable    CritCare Time    Disposition  Final diagnoses:   Left knee sprain     Time reflects when diagnosis was documented in both MDM as applicable and the Disposition within this note     Time User Action Codes Description Comment    3/15/2018  1:06 PM Fahad Kathleen  92XA] Left knee sprain       ED Disposition     ED Disposition Condition Comment    Discharge  Gabriel Lee discharge to home/self care  Condition at discharge: Good        Follow-up Information     Follow up With Specialties Details Why Contact Boubacar Russell DO Family Medicine Schedule an appointment as soon as possible for a visit Seen in ER need followup for injury [address]  [phone]      Kemi Kathleen MD Orthopedic Surgery Schedule an appointment as soon as possible for a visit in 1 week orthopedic followup [address] [place] [address]  [phone]          Discharge Medication List as of 3/15/2018  1:08 PM      START taking these medications    Details   acetaminophen (TYLENOL) 500 mg tablet Take 2 tablets (1,000 mg total) by mouth every 6 (six) hours as needed (pain/injury), Starting Thu 3/15/2018, Normal      lidocaine (XYLOCAINE) 2 % topical gel Apply 1 application topically 2 (two) times a day, Starting Thu 3/15/2018, Normal         CONTINUE these medications which have NOT CHANGED    Details   albuterol (VENTOLIN HFA) 90 mcg/act inhaler 2 puffs every 4 (four) hours as needed, Starting Mon 3/23/2015, Historical Med      cetirizine (ZyrTEC) 10 mg tablet Take 10 mg by mouth daily  , Starting Fri 2/27/2015, Historical Med      fluticasone (FLONASE) 50 mcg/act nasal spray 2 sprays into each nostril daily, Starting Mon 4/4/2016, Historical Med      fluticasone (FLOVENT HFA) 110 MCG/ACT inhaler Inhale 2 puffs 2 (two) times a day as needed  , Starting Tue 9/8/2015, Historical Med      levothyroxine 125 mcg tablet Take 1 tablet by mouth daily, Starting Thu 8/28/2014, Historical Med           No discharge procedures on file      ED Provider  Electronically Signed by           Mitzi Foreman PA-C  03/15/18 2021

## 2018-03-15 NOTE — DISCHARGE INSTRUCTIONS
Knee Sprain   WHAT YOU NEED TO KNOW:   A knee sprain occurs when one or more ligaments in your knee are suddenly stretched or torn  Ligaments are tissues that hold bones together  Ligaments support the knee and keep the joint and bones in the correct position  DISCHARGE INSTRUCTIONS:   Return to the emergency department if:   · Any part of your leg feels cold, numb, or looks pale     Contact your healthcare provider if:   · You have new or increased swelling, bruising, or pain in your knee  · Your symptoms do not improve within 6 weeks, even with treatment  · You have questions or concerns about your condition or care  Medicines:   · NSAIDs , such as ibuprofen, help decrease swelling, pain, and fever  This medicine is available with or without a doctor's order  NSAIDs can cause stomach bleeding or kidney problems in certain people  If you take blood thinner medicine, always ask your healthcare provider if NSAIDs are safe for you  Always read the medicine label and follow directions  · Acetaminophen  decreases pain and fever  It is available without a doctor's order  Ask how much to take and how often to take it  Follow directions  Read the labels of all other medicines you are using to see if they also contain acetaminophen, or ask your doctor or pharmacist  Acetaminophen can cause liver damage if not taken correctly  Do not use more than 4 grams (4,000 milligrams) total of acetaminophen in one day  · Prescription pain medicine  may be given  Ask how to take this medicine safely  · Take your medicine as directed  Contact your healthcare provider if you think your medicine is not helping or if you have side effects  Tell him or her if you are allergic to any medicine  Keep a list of the medicines, vitamins, and herbs you take  Include the amounts, and when and why you take them  Bring the list or the pill bottles to follow-up visits   Carry your medicine list with you in case of an emergency  Self-care:   · Rest  your knee and do not exercise  You may be told to keep weight off your knee  This means that you should not walk on your injured leg  Rest helps decrease swelling and allows the injury to heal  You can do gentle range of motion (ROM) exercises as directed  This will prevent stiffness  · Apply ice  on your knee for 15 to 20 minutes every hour or as directed  Use an ice pack, or put crushed ice in a plastic bag  Cover it with a towel  Ice helps prevent tissue damage and decreases swelling and pain  · Apply compression to your knee as directed  You may need to wear an elastic bandage  This helps keep your injured knee from moving too much while it heals  You can loosen or tighten the elastic bandage to make it comfortable  It should be tight enough for you to feel support  It should not be so tight that it causes your toes to feel numb or tingly  If you are wearing an elastic bandage, take it off and rewrap it once a day  · Elevate your knee  above the level of your heart as often as you can  This will help decrease swelling and pain  Prop your leg on pillows or blankets to keep it elevated comfortably  Do not put pillows directly behind your knee  · Use support devices as directed:  Support devices such as a splint or brace may be needed  These devices limit movement and protect your joint while it heals  You may be given crutches to use until you can stand on your injured leg without pain  Use devices as directed  Physical therapy:  A physical therapist teaches you exercises to help improve movement and strength, and to decrease pain  Prevent another knee sprain:  Exercise your legs to keep your muscles strong  Strong leg muscles help protect your knee and prevent strain  The following may also prevent a knee sprain:  · Slowly start your exercise or training program   Slowly increase the time, distance, and intensity of your exercise   Sudden increases in training may cause you to injure your knee again  · Wear protective braces and equipment as directed  Braces may prevent your knee from moving the wrong way and causing another sprain  Protective equipment may support your bones and ligaments to prevent injury  · Warm up and stretch before exercise  Warm up by walking or using an exercise bike before starting your regular exercise  Do gentle stretches after warming up  This helps to loosen your muscles and decrease stress on your knee  Cool down and stretch after you exercise  · Wear shoes that fit correctly and support your feet  Replace your running or exercise shoes before the padding or shock absorption is worn out  Ask your healthcare provider which exercise shoes are best for you  Ask if you should wear special shoe inserts  Shoe inserts can help support your heels and arches or keep your foot lined up correctly in your shoes  Exercise on flat surfaces  Follow up with your healthcare provider as directed:  Write down your questions so you remember to ask them during your visits  © 2017 [address] [facility] Information is for End User's use only and may not be sold, redistributed or otherwise used for commercial purposes  All illustrations and images included in CareNotes® are the copyrighted property of [employer]  or [address]  The above information is an  only  It is not intended as medical advice for individual conditions or treatments  Talk to your doctor, nurse or pharmacist before following any medical regimen to see if it is safe and effective for you

## 2018-05-04 ENCOUNTER — TRANSCRIBE ORDERS (OUTPATIENT)
Dept: ADMINISTRATIVE | Facility: HOSPITAL | Age: 53
End: 2018-05-04

## 2018-05-04 DIAGNOSIS — Z12.39 BREAST SCREENING: Primary | ICD-10-CM

## 2018-05-24 ENCOUNTER — HOSPITAL ENCOUNTER (OUTPATIENT)
Dept: MAMMOGRAPHY | Facility: HOSPITAL | Age: 53
Discharge: HOME | End: 2018-05-24
Payer: COMMERCIAL

## 2018-05-24 DIAGNOSIS — Z12.39 BREAST SCREENING: ICD-10-CM

## 2018-05-24 PROCEDURE — 77067 SCR MAMMO BI INCL CAD: CPT

## 2018-06-16 ENCOUNTER — OFFICE VISIT (OUTPATIENT)
Dept: URGENT CARE | Age: 53
End: 2018-06-16
Payer: COMMERCIAL

## 2018-06-16 ENCOUNTER — APPOINTMENT (OUTPATIENT)
Dept: RADIOLOGY | Age: 53
End: 2018-06-16
Payer: COMMERCIAL

## 2018-06-16 VITALS
TEMPERATURE: 97.9 F | RESPIRATION RATE: 16 BRPM | WEIGHT: 133 LBS | OXYGEN SATURATION: 97 % | BODY MASS INDEX: 24.48 KG/M2 | HEIGHT: 62 IN | HEART RATE: 66 BPM | DIASTOLIC BLOOD PRESSURE: 86 MMHG | SYSTOLIC BLOOD PRESSURE: 154 MMHG

## 2018-06-16 DIAGNOSIS — M10.9 ACUTE GOUT INVOLVING TOE OF RIGHT FOOT, UNSPECIFIED CAUSE: Primary | ICD-10-CM

## 2018-06-16 DIAGNOSIS — M79.671 RIGHT FOOT PAIN: ICD-10-CM

## 2018-06-16 PROCEDURE — 73660 X-RAY EXAM OF TOE(S): CPT

## 2018-06-16 PROCEDURE — G0382 LEV 3 HOSP TYPE B ED VISIT: HCPCS | Performed by: PHYSICIAN ASSISTANT

## 2018-06-16 PROCEDURE — 99283 EMERGENCY DEPT VISIT LOW MDM: CPT | Performed by: PHYSICIAN ASSISTANT

## 2018-06-16 RX ORDER — PREDNISONE 20 MG/1
60 TABLET ORAL DAILY
Qty: 15 TABLET | Refills: 0 | Status: SHIPPED | OUTPATIENT
Start: 2018-06-16 | End: 2018-06-21

## 2018-06-16 NOTE — LETTER
June 16, 2018     Patient: Glendy Sharif   YOB: 1965   Date of Visit: 6/16/2018       To Whom It May Concern: It is my medical opinion that Jose Guevara may return to work on 06/18/2028  If you have any questions or concerns, please don't hesitate to call           Sincerely,        NANO DOZIER    CC: No Recipients

## 2018-06-17 NOTE — PATIENT INSTRUCTIONS
Take medications as directed  Drink plenty of fluids  Follow up with family doctor in 48-72 hours for recheck  Go to ER immediately if new or worsening symptoms occur  Gout   WHAT YOU NEED TO KNOW:   Gout is a form of arthritis that causes severe joint pain, redness, swelling, and stiffness  Acute gout pain starts suddenly, gets worse quickly, and stops on its own  Acute gout can become chronic and cause permanent damage to the joints  DISCHARGE INSTRUCTIONS:   Return to the emergency department if:   · You have severe pain in one or more of your joints that you cannot tolerate  · You have a fever or redness that spreads beyond the joint area  Contact your healthcare provider if:   · You have new symptoms, such as a rash, after you start gout treatment  · Your joint pain and swelling do not go away, even after treatment  · You are not urinating as much or as often as you usually do  · You have trouble taking your gout medicines  · You have questions or concerns about your condition or care  Medicines: You may need any of the following:  · Prescription pain medicine  may be given  Ask your healthcare provider how to take this medicine safely  Some prescription pain medicines contain acetaminophen  Do not take other medicines that contain acetaminophen without talking to your healthcare provider  Too much acetaminophen may cause liver damage  Prescription pain medicine may cause constipation  Ask your healthcare provider how to prevent or treat constipation  · NSAIDs , such as ibuprofen, help decrease swelling, pain, and fever  This medicine is available with or without a doctor's order  NSAIDs can cause stomach bleeding or kidney problems in certain people  If you take blood thinner medicine, always ask your healthcare provider if NSAIDs are safe for you  Always read the medicine label and follow directions  · Gout medicine  decreases joint pain and swelling   It may also be given to prevent new gout attacks  · Steroids  reduce inflammation and can help your joint stiffness and pain during gout attacks  · Uric acid medicine  may be given to reduce the amount of uric acid your body makes  Some medicines may help you pass more uric acid when you urinate  · Take your medicine as directed  Contact your healthcare provider if you think your medicine is not helping or if you have side effects  Tell him or her if you are allergic to any medicine  Keep a list of the medicines, vitamins, and herbs you take  Include the amounts, and when and why you take them  Bring the list or the pill bottles to follow-up visits  Carry your medicine list with you in case of an emergency  Follow up with your healthcare provider as directed:  Write down your questions so you remember to ask them during your visits  Manage gout:   · Rest your painful joint so it can heal   Your healthcare provider may recommend crutches or a walker if the affected joint is in a leg  · Apply ice to your joint  Ice decreases pain and swelling  Use an ice pack, or put crushed ice in a plastic bag  Cover the ice pack or bag with a towel before you apply it to your painful joint  Apply ice for 15 to 20 minutes every hour, or as directed  · Elevate your joint  Elevation helps reduce swelling and pain  Raise your joint above the level of your heart as often as you can  Prop your painful joint on pillows to keep it above your heart comfortably  · Go to physical therapy if directed  A physical therapist can teach you exercises to improve flexibility and range of motion  Prevent gout attacks:   · Do not eat high-purine foods  These foods include meats, seafood, asparagus, spinach, cauliflower, and some types of beans  Healthcare providers may tell you to eat more low-fat milk products, such as yogurt  Milk products may decrease your risk for gout attacks  Vitamin C and coffee may also help   Your healthcare provider or dietitian can help you create a meal plan  · Drink more liquids as directed  Liquids such as water help remove uric acid from your body  Ask how much liquid to drink each day and which liquids are best for you  · Manage your weight  Weight loss may decrease the amount of uric acid in your body  Exercise can help you lose weight  Talk to your healthcare provider about the best exercises for you  · Control your blood sugar level if you have diabetes  Keep your blood sugar level in a normal range  This can help prevent gout attacks  · Limit or do not drink alcohol as directed  Alcohol can trigger a gout attack  Alcohol also increases your risk for dehydration  Ask your healthcare provider if alcohol is safe for you  © 2017 [address] [facility] Information is for End User's use only and may not be sold, redistributed or otherwise used for commercial purposes  All illustrations and images included in CareNotes® are the copyrighted property of [employer]  or [employer]  The above information is an  only  It is not intended as medical advice for individual conditions or treatments  Talk to your doctor, nurse or pharmacist before following any medical regimen to see if it is safe and effective for you

## 2018-06-17 NOTE — PROGRESS NOTES
[facility]        NAME: Junito Han is a 52 y o  female  : 1965    MRN: [id number]  DATE: 2018  TIME: 8:13 PM    Assessment and Plan   Acute gout involving toe of right foot, unspecified cause [M10 9]  1  Acute gout involving toe of right foot, unspecified cause  CANCELED: XR foot 3+ vw right         Patient Instructions       Take medications as directed  Drink plenty of fluids  Follow up with family doctor in 48-72 hours for recheck  Go to ER immediately if new or worsening symptoms occur  Chief Complaint     Chief Complaint   Patient presents with    Toe Pain     r  big toe pain since am; ;denies injury         History of Present Illness       Toe Pain    The incident occurred 12 to 24 hours ago  The incident occurred at home  There was no injury mechanism (Completely atraumatic  Patient woke up like this )  Pain location: Right first MTP joint radiating into distal toe  The quality of the pain is described as aching  The pain is at a severity of 8/10  The pain has been constant since onset  Associated symptoms include an inability to bear weight and a loss of motion  Pertinent negatives include no loss of sensation, muscle weakness or tingling  The symptoms are aggravated by palpation and weight bearing  She has tried nothing for the symptoms  Patient has no history of gout  Patient states she does not drink  Review of Systems   Review of Systems   Constitutional: Negative for chills, diaphoresis, fatigue and fever  HENT: Negative  Eyes: Negative  Respiratory: Negative for cough, chest tightness and shortness of breath  Cardiovascular: Negative for chest pain and palpitations  Gastrointestinal: Negative for constipation, diarrhea, nausea and vomiting  Endocrine: Negative  Genitourinary: Negative for dysuria  Musculoskeletal: Positive for gait problem  Negative for back pain, myalgias and neck pain  Skin: Negative for pallor and rash  Allergic/Immunologic: Negative  Neurological: Negative for dizziness, tingling, syncope, weakness and headaches  Hematological: Negative  Psychiatric/Behavioral: Negative            Current Medications       Current Outpatient Prescriptions:     acetaminophen (TYLENOL) 500 mg tablet, Take 2 tablets (1,000 mg total) by mouth every 6 (six) hours as needed (pain/injury), Disp: 30 tablet, Rfl: 0    albuterol (VENTOLIN HFA) 90 mcg/act inhaler, 2 puffs every 4 (four) hours as needed, Disp: , Rfl:     cetirizine (ZyrTEC) 10 mg tablet, Take 10 mg by mouth daily  , Disp: , Rfl:     fluticasone (FLONASE) 50 mcg/act nasal spray, 2 sprays into each nostril daily, Disp: , Rfl:     fluticasone (FLOVENT HFA) 110 MCG/ACT inhaler, Inhale 2 puffs 2 (two) times a day as needed  , Disp: , Rfl:     levothyroxine 125 mcg tablet, Take 1 tablet by mouth daily, Disp: , Rfl:     lidocaine (XYLOCAINE) 2 % topical gel, Apply 1 application topically 2 (two) times a day, Disp: 30 mL, Rfl: 0    Current Allergies     Allergies as of 06/16/2018 - Reviewed 03/15/2018   Allergen Reaction Noted    Ibuprofen Anaphylaxis and Edema 11/14/2013    Aspirin Hives 03/27/2016            The following portions of the patient's history were reviewed and updated as appropriate: allergies, current medications, past family history, past medical history, past social history, past surgical history and problem list      Past Medical History:   Diagnosis Date    Accidental fall     LAST ASSESSED: [id number]    Asthma     Cellulitis of left thigh     LAST ASSESSED: [id number]    Dermatitis     LAST ASSESSED: [id number]    Disease of thyroid gland     History of ITP     LAST ASSESSED: [id number]    Ingrown nail     RESOLVED: [id number]    Personal history of ovarian cyst     Pneumonia     LAST ASSESSED: [id number]    Right cervical radiculopathy     LAST ASSESSED: [id number]       Past Surgical History:   Procedure Laterality Date    SPLENECTOMY 1998    TUBAL LIGATION         Family History   Problem Relation Age of Onset    Breast cancer Sister         MALIGNANT NEOPLASM    Diabetes Family          Medications have been verified  Objective   /86   Pulse 66   Temp 97 9 °F (36 6 °C)   Resp 16   Ht 5' 2" (1 575 m)   Wt 60 3 kg (133 lb)   SpO2 97%   BMI 24 33 kg/m²        Physical Exam     Physical Exam   Constitutional: She appears well-developed and well-nourished  No distress  HENT:   Head: Normocephalic and atraumatic  Eyes: Conjunctivae are normal  Right eye exhibits no discharge  Left eye exhibits no discharge  Neck: Normal range of motion  Neck supple  Cardiovascular: Normal rate, regular rhythm, normal heart sounds and intact distal pulses  Pulmonary/Chest: Effort normal and breath sounds normal  No respiratory distress  She has no wheezes  She has no rales  Musculoskeletal:   Redness and swelling noted to right 1st MTP joint  Range of motion limited due to pain  Warmth noted to the area  No bruising noted  Sensation intact and symmetrical bilaterally  Lymphadenopathy:     She has no cervical adenopathy  Skin: Skin is warm  No rash noted  She is not diaphoretic  Nursing note and vitals reviewed

## 2018-09-27 ENCOUNTER — HOSPITAL ENCOUNTER (EMERGENCY)
Facility: HOSPITAL | Age: 53
Discharge: HOME | End: 2018-09-27
Attending: EMERGENCY MEDICINE | Admitting: EMERGENCY MEDICINE
Payer: COMMERCIAL

## 2018-09-27 VITALS
OXYGEN SATURATION: 97 % | RESPIRATION RATE: 20 BRPM | BODY MASS INDEX: 21.95 KG/M2 | DIASTOLIC BLOOD PRESSURE: 74 MMHG | HEART RATE: 71 BPM | TEMPERATURE: 98.1 F | SYSTOLIC BLOOD PRESSURE: 181 MMHG | WEIGHT: 120 LBS

## 2018-09-27 DIAGNOSIS — J20.9 ACUTE BRONCHITIS: Primary | ICD-10-CM

## 2018-09-27 PROCEDURE — 93005 ELECTROCARDIOGRAM TRACING: CPT

## 2018-09-27 PROCEDURE — 99283 EMERGENCY DEPT VISIT LOW MDM: CPT

## 2018-09-27 RX ORDER — PROMETHAZINE HYDROCHLORIDE AND CODEINE PHOSPHATE 6.25; 1 MG/5ML; MG/5ML
5 SYRUP ORAL EVERY 4 HOURS PRN
Qty: 120 ML | Refills: 0 | Status: SHIPPED | OUTPATIENT
Start: 2018-09-27 | End: 2018-08-02

## 2018-09-27 RX ORDER — AZITHROMYCIN 250 MG/1
250 TABLET, FILM COATED ORAL DAILY
Qty: 6 TABLET | Refills: 0 | Status: SHIPPED | OUTPATIENT
Start: 2018-09-27 | End: 2018-07-02

## 2018-09-27 NOTE — DISCHARGE INSTRUCTIONS
Acute Bronchitis   WHAT YOU NEED TO KNOW:   Acute bronchitis is swelling and irritation in the air passages of your lungs  This irritation may cause you to cough or have other breathing problems  Acute bronchitis often starts because of another illness, such as a cold or the flu  The illness spreads from your nose and throat to your windpipe and airways  Bronchitis is often called a chest cold  Acute bronchitis lasts about 3 to 6 weeks and is usually not a serious illness  Your cough can last for several weeks  DISCHARGE INSTRUCTIONS:   Return to the emergency department if:   · You cough up blood  · Your lips or fingernails turn blue  · You feel like you are not getting enough air when you breathe  Contact your healthcare provider if:   · You have a fever  · Your breathing problems do not go away or get worse  · Your cough does not get better within 4 weeks  · You have questions or concerns about your condition or care  Self-care:   · Get more rest   Rest helps your body to heal  Slowly start to do more each day  Rest when you feel it is needed  · Avoid irritants in the air  Avoid chemicals, fumes, and dust  Wear a face mask if you must work around dust or fumes  Stay inside on days when air pollution levels are high  If you have allergies, stay inside when pollen counts are high  Do not use aerosol products, such as spray-on deodorant, bug spray, and hair spray  · Do not smoke or be around others who smoke  Nicotine and other chemicals in cigarettes and cigars damages the cilia that move mucus out of your lungs  Ask your healthcare provider for information if you currently smoke and need help to quit  E-cigarettes or smokeless tobacco still contain nicotine  Talk to your healthcare provider before you use these products  · Drink liquids as directed  Liquids help keep your air passages moist and help you cough up mucus   You may need to drink more liquids when you have acute bronchitis  Ask how much liquid to drink each day and which liquids are best for you  · Use a humidifier or vaporizer  Use a cool mist humidifier or a vaporizer to increase air moisture in your home  This may make it easier for you to breathe and help decrease your cough  Decrease risk for acute bronchitis:   · Get the vaccinations you need  Ask your healthcare provider if you should get vaccinated against the flu or pneumonia  · Prevent the spread of germs  You can decrease your risk of acute bronchitis and other illnesses by doing the following:     [facility] [place] [facility] your hands often with soap and water  Carry germ-killing hand lotion or gel with you  You can use the lotion or gel to clean your hands when soap and water are not available  ¨ Do not touch your eyes, nose, or mouth unless you have washed your hands first     ¨ Always cover your mouth when you cough to prevent the spread of germs  It is best to cough into a tissue or your shirt sleeve instead of into your hand  Ask those around you cover their mouths when they cough  ¨ Try to avoid people who have a cold or the flu  If you are sick, stay away from others as much as possible  Medicines: Your healthcare provider may  give you any of the following:  · Ibuprofen or acetaminophen  are medicines that help lower your fever  They are available without a doctor's order  Ask your healthcare provider which medicine is right for you  Ask how much to take and how often to take it  Follow directions  These medicines can cause stomach bleeding if not taken correctly  Ibuprofen can cause kidney damage  Do not take ibuprofen if you have kidney disease, an ulcer, or allergies to aspirin  Acetaminophen can cause liver damage  Do not take more than 4,000 milligrams in 24 hours  · Decongestants  help loosen mucus in your lungs and make it easier to cough up  This can help you breathe easier  · Cough suppressants  decrease your urge to cough   If your cough produces mucus, do not take a cough suppressant unless your healthcare provider tells you to  Your healthcare provider may suggest that you take a cough suppressant at night so you can rest     · Inhalers  may be given  Your healthcare provider may give you one or more inhalers to help you breathe easier and cough less  An inhaler gives your medicine to open your airways  Ask your healthcare provider to show you how to use your inhaler correctly  · Take your medicine as directed  Contact your healthcare provider if you think your medicine is not helping or if you have side effects  Tell him of her if you are allergic to any medicine  Keep a list of the medicines, vitamins, and herbs you take  Include the amounts, and when and why you take them  Bring the list or the pill bottles to follow-up visits  Carry your medicine list with you in case of an emergency  Follow up with your healthcare provider as directed:  Write down questions you have so you will remember to ask them during your follow-up visits  © 2017 [employer] Information is for End User's use only and may not be sold, redistributed or otherwise used for commercial purposes  All illustrations and images included in CareNotes® are the copyrighted property of [employer]  or [employer]if[employer]  The above information is an  only  It is not intended as medical advice for individual conditions or treatments  Talk to your doctor, nurse or pharmacist before following any medical regimen to see if it is safe and effective for you

## 2018-09-27 NOTE — ED PROVIDER NOTES
History  Chief Complaint   Patient presents with    Cough     cough x2 weeks, reports green mucus  Also reporting chest pain with cough, describes it as tightness  Denies radiation  12-year-old female with a history of asthma presents to the emergency department with a 2 week history of cough sometimes productive of green sputum  No fevers or chills  No shortness of breath  She states sometimes when she coughs she does experience pain in the chest   She has not taken anything for her symptoms  History provided by:  Patient   used: No    Cough   Cough characteristics:  Productive  Sputum characteristics:  Green  Severity:  Unable to specify  Onset quality:  Gradual  Duration:  2 weeks  Timing:  Intermittent  Progression:  Unchanged  Chronicity:  New  Smoker: no    Context: sick contacts and upper respiratory infection    Relieved by:  None tried  Worsened by:  Nothing  Ineffective treatments:  None tried  Associated symptoms: chest pain (With cough)    Associated symptoms: no chills, no diaphoresis, no ear fullness, no ear pain, no eye discharge, no fever, no headaches, no myalgias, no rash, no rhinorrhea, no shortness of breath, no sinus congestion, no sore throat, no weight loss and no wheezing    Risk factors: no chemical exposure, no recent infection and no recent travel        Prior to Admission Medications   Prescriptions Last Dose Informant Patient Reported?  Taking?   acetaminophen (TYLENOL) 500 mg tablet   No Yes   Sig: Take 2 tablets (1,000 mg total) by mouth every 6 (six) hours as needed (pain/injury)   albuterol (VENTOLIN HFA) 90 mcg/act inhaler   Yes Yes   Si puffs every 4 (four) hours as needed   cetirizine (ZyrTEC) 10 mg tablet   Yes Yes   Sig: Take 10 mg by mouth daily     fluticasone (FLONASE) 50 mcg/act nasal spray   Yes Yes   Si sprays into each nostril daily   fluticasone (FLOVENT HFA) 110 MCG/ACT inhaler   Yes Yes   Sig: Inhale 2 puffs 2 (two) times a day as needed     levothyroxine 125 mcg tablet   Yes Yes   Sig: Take 1 tablet by mouth daily   lidocaine (XYLOCAINE) 2 % topical gel   No Yes   Sig: Apply 1 application topically 2 (two) times a day      Facility-Administered Medications: None       Past Medical History:   Diagnosis Date    Accidental fall     LAST ASSESSED: [id number]    Asthma     Cellulitis of left thigh     LAST ASSESSED: [id number]    Dermatitis     LAST ASSESSED: [id number]    Disease of thyroid gland     History of ITP     LAST ASSESSED: [id number]    Ingrown nail     RESOLVED: [id number]    Personal history of ovarian cyst     Pneumonia     LAST ASSESSED: [id number]    Right cervical radiculopathy     LAST ASSESSED: [id number]       Past Surgical History:   Procedure Laterality Date    SPLENECTOMY  1998    TUBAL LIGATION         Family History   Problem Relation Age of Onset    Breast cancer Sister         MALIGNANT NEOPLASM    Diabetes Family      I have reviewed and agree with the history as documented  Social History   Substance Use Topics    Smoking status: Former Smoker    Smokeless tobacco: Never Used      Comment: QUIT AROUND AGE 46    Alcohol use No        Review of Systems   Constitutional: Negative  Negative for chills, diaphoresis, fatigue, fever and weight loss  HENT: Negative  Negative for congestion, ear pain, rhinorrhea and sore throat  Eyes: Negative  Negative for discharge, redness and itching  Respiratory: Positive for cough  Negative for apnea, chest tightness, shortness of breath and wheezing  Cardiovascular: Positive for chest pain (With cough)  Negative for palpitations and leg swelling  Gastrointestinal: Negative  Negative for abdominal pain  Endocrine: Negative  Genitourinary: Negative  Negative for flank pain, frequency and urgency  Musculoskeletal: Negative  Negative for back pain and myalgias  Skin: Negative  Negative for rash  Allergic/Immunologic: Negative  Neurological: Negative  Negative for dizziness, syncope, weakness, light-headedness, numbness and headaches  Hematological: Negative  All other systems reviewed and are negative  Physical Exam  Physical Exam   Constitutional: She is oriented to person, place, and time  She appears well-developed and well-nourished  Non-toxic appearance  She does not have a sickly appearance  She does not appear ill  No distress  HENT:   Head: Normocephalic and atraumatic  Right Ear: Tympanic membrane and external ear normal    Left Ear: Tympanic membrane and external ear normal    Nose: Nose normal    Mouth/Throat: Oropharynx is clear and moist  No oropharyngeal exudate  Eyes: Pupils are equal, round, and reactive to light  Conjunctivae are normal  Right eye exhibits no discharge  Left eye exhibits no discharge  No scleral icterus  Neck: Normal range of motion  Neck supple  Cardiovascular: Normal rate, regular rhythm and normal heart sounds  Exam reveals no gallop and no friction rub  No murmur heard  Pulmonary/Chest: Effort normal  No accessory muscle usage or stridor  No tachypnea and no bradypnea  No respiratory distress  She has no decreased breath sounds  She has no wheezes  She has no rhonchi  She has rales in the left lower field  She exhibits no tenderness  Abdominal: Soft  Bowel sounds are normal  She exhibits no distension and no mass  There is no tenderness  No hernia  Musculoskeletal: Normal range of motion  She exhibits no edema  Lymphadenopathy:     She has no cervical adenopathy  Neurological: She is alert and oriented to person, place, and time  She has normal reflexes  She exhibits normal muscle tone  Skin: Skin is warm and dry  No rash noted  She is not diaphoretic  No erythema  No pallor  Psychiatric: She has a normal mood and affect  Nursing note and vitals reviewed        Vital Signs  ED Triage Vitals [09/27/18 1723]   Temperature Pulse Respirations Blood Pressure SpO2 98 1 °F (36 7 °C) 71 20 (!) 181/74 97 %      Temp Source Heart Rate Source Patient Position - Orthostatic VS BP Location FiO2 (%)   Temporal Monitor Sitting Right arm --      Pain Score       --           Vitals:    09/27/18 1723   BP: (!) 181/74   Pulse: 71   Patient Position - Orthostatic VS: Sitting       Visual Acuity      ED Medications  Medications - No data to display    Diagnostic Studies  Results Reviewed     None                 No orders to display              Procedures  Procedures       Phone Contacts  ED Phone Contact    ED Course                               MDM  Number of Diagnoses or Management Options  Diagnosis management comments: 47-year-old female presents with a 2 week history of cough sometimes productive of green mucus  No fevers or chills  Sometime she has chest pain with the cough  No meds taken for her symptoms  On exam she appears well in no acute distress  She does have a cough in the room  She has rales at the left base  Her vital signs here are completely stable and she looks well  Given her symptoms for 2 weeks will treat with antibiotics and medication for cough and have her follow up with her family physician if her symptoms do not improve or get worse within the next few days  CritCare Time    Disposition  Final diagnoses:   Acute bronchitis     Time reflects when diagnosis was documented in both MDM as applicable and the Disposition within this note     Time User Action Codes Description Comment    9/27/2018  6:09 PM Isac KING Add [J20 9] Acute bronchitis       ED Disposition     ED Disposition Condition Comment    Discharge  Terrell Hams discharge to home/self care  Condition at discharge: Good        Follow-up Information     Follow up With Specialties Details Why Contact Boubacar Oconnell DO Family Medicine Schedule an appointment as soon as possible for a visit in 2 days If symptoms worsen [id number] [place] [phone]  [phone]            Patient's Medications   Discharge Prescriptions    AZITHROMYCIN (ZITHROMAX) 250 MG TABLET    Take 1 tablet (250 mg total) by mouth daily for 5 days Take first 2 tablets together, then 1 every day until finished  Start Date: 9/27/2018 End Date: 10/2/2018       Order Dose: 250 mg       Quantity: 6 tablet    Refills: 0    PROMETHAZINE-CODEINE (PHENERGAN WITH CODEINE) 6 25-10 MG/5 ML SYRUP    Take 5 mL by mouth every 4 (four) hours as needed for cough for up to 10 days       Start Date: 9/27/2018 End Date: 10/7/2018       Order Dose: 5 mL       Quantity: 120 mL    Refills: 0     No discharge procedures on file      ED Provider  Electronically Signed by           Kinjal Roberson DO  09/27/18 7103

## 2018-09-29 LAB
ATRIAL RATE: 66 BPM
P AXIS: 62 DEGREES
PR INTERVAL: 170 MS
QRS AXIS: 79 DEGREES
QRSD INTERVAL: 78 MS
QT INTERVAL: 410 MS
QTC INTERVAL: 429 MS
T WAVE AXIS: 67 DEGREES
VENTRICULAR RATE: 66 BPM

## 2018-09-29 PROCEDURE — 93010 ELECTROCARDIOGRAM REPORT: CPT | Performed by: INTERNAL MEDICINE

## 2018-10-02 ENCOUNTER — OFFICE VISIT (OUTPATIENT)
Dept: FAMILY MEDICINE CLINIC | Facility: CLINIC | Age: 53
End: 2018-10-02
Payer: COMMERCIAL

## 2018-10-02 ENCOUNTER — APPOINTMENT (OUTPATIENT)
Dept: LAB | Facility: HOSPITAL | Age: 53
End: 2018-10-02
Payer: COMMERCIAL

## 2018-10-02 VITALS
BODY MASS INDEX: 25.68 KG/M2 | HEART RATE: 66 BPM | DIASTOLIC BLOOD PRESSURE: 80 MMHG | WEIGHT: 130.8 LBS | SYSTOLIC BLOOD PRESSURE: 134 MMHG | HEIGHT: 60 IN | OXYGEN SATURATION: 97 %

## 2018-10-02 DIAGNOSIS — E03.9 ACQUIRED HYPOTHYROIDISM: ICD-10-CM

## 2018-10-02 DIAGNOSIS — Z00.00 WELL ADULT HEALTH CHECK: Primary | ICD-10-CM

## 2018-10-02 DIAGNOSIS — M79.674 PAIN OF RIGHT GREAT TOE: ICD-10-CM

## 2018-10-02 DIAGNOSIS — J45.40 MODERATE PERSISTENT ASTHMA WITHOUT COMPLICATION: ICD-10-CM

## 2018-10-02 DIAGNOSIS — Z11.59 ENCOUNTER FOR HEPATITIS C SCREENING TEST FOR LOW RISK PATIENT: ICD-10-CM

## 2018-10-02 DIAGNOSIS — Z23 NEED FOR INFLUENZA VACCINATION: ICD-10-CM

## 2018-10-02 DIAGNOSIS — Q89.01 ASPLENIA: ICD-10-CM

## 2018-10-02 DIAGNOSIS — Z12.11 ENCOUNTER FOR SCREENING COLONOSCOPY: ICD-10-CM

## 2018-10-02 PROBLEM — Z87.891 FORMER SMOKER: Status: ACTIVE | Noted: 2018-10-02

## 2018-10-02 LAB
ALBUMIN SERPL BCP-MCNC: 4 G/DL (ref 3.5–5)
ALP SERPL-CCNC: 69 U/L (ref 46–116)
ALT SERPL W P-5'-P-CCNC: 21 U/L (ref 12–78)
ANION GAP SERPL CALCULATED.3IONS-SCNC: 8 MMOL/L (ref 4–13)
AST SERPL W P-5'-P-CCNC: 14 U/L (ref 5–45)
BILIRUB SERPL-MCNC: 0.34 MG/DL (ref 0.2–1)
BUN SERPL-MCNC: 14 MG/DL (ref 5–25)
CALCIUM SERPL-MCNC: 9.7 MG/DL (ref 8.3–10.1)
CHLORIDE SERPL-SCNC: 104 MMOL/L (ref 100–108)
CO2 SERPL-SCNC: 30 MMOL/L (ref 21–32)
CREAT SERPL-MCNC: 0.92 MG/DL (ref 0.6–1.3)
ERYTHROCYTE [DISTWIDTH] IN BLOOD BY AUTOMATED COUNT: 14.8 % (ref 11.6–15.1)
GFR SERPL CREATININE-BSD FRML MDRD: 70 ML/MIN/1.73SQ M
GLUCOSE SERPL-MCNC: 114 MG/DL (ref 65–140)
HCT VFR BLD AUTO: 44.4 % (ref 34.8–46.1)
HGB BLD-MCNC: 15.1 G/DL (ref 11.5–15.4)
MCH RBC QN AUTO: 31.4 PG (ref 26.8–34.3)
MCHC RBC AUTO-ENTMCNC: 34 G/DL (ref 31.4–37.4)
MCV RBC AUTO: 92 FL (ref 82–98)
PLATELET # BLD AUTO: 311 THOUSANDS/UL (ref 149–390)
PMV BLD AUTO: 12.2 FL (ref 8.9–12.7)
POTASSIUM SERPL-SCNC: 3.9 MMOL/L (ref 3.5–5.3)
PROT SERPL-MCNC: 7.7 G/DL (ref 6.4–8.2)
RBC # BLD AUTO: 4.81 MILLION/UL (ref 3.81–5.12)
SODIUM SERPL-SCNC: 142 MMOL/L (ref 136–145)
TSH SERPL DL<=0.05 MIU/L-ACNC: 24.07 UIU/ML (ref 0.36–3.74)
URATE SERPL-MCNC: 4.1 MG/DL (ref 2–6.8)
WBC # BLD AUTO: 8.49 THOUSAND/UL (ref 4.31–10.16)

## 2018-10-02 PROCEDURE — 86803 HEPATITIS C AB TEST: CPT

## 2018-10-02 PROCEDURE — 85027 COMPLETE CBC AUTOMATED: CPT | Performed by: FAMILY MEDICINE

## 2018-10-02 PROCEDURE — 90682 RIV4 VACC RECOMBINANT DNA IM: CPT

## 2018-10-02 PROCEDURE — 84550 ASSAY OF BLOOD/URIC ACID: CPT

## 2018-10-02 PROCEDURE — 36415 COLL VENOUS BLD VENIPUNCTURE: CPT | Performed by: FAMILY MEDICINE

## 2018-10-02 PROCEDURE — 84443 ASSAY THYROID STIM HORMONE: CPT

## 2018-10-02 PROCEDURE — 80053 COMPREHEN METABOLIC PANEL: CPT | Performed by: FAMILY MEDICINE

## 2018-10-02 PROCEDURE — 99396 PREV VISIT EST AGE 40-64: CPT | Performed by: FAMILY MEDICINE

## 2018-10-02 PROCEDURE — 90471 IMMUNIZATION ADMIN: CPT

## 2018-10-02 RX ORDER — FLUTICASONE PROPIONATE 110 UG/1
2 AEROSOL, METERED RESPIRATORY (INHALATION) 2 TIMES DAILY
Qty: 1 INHALER | Refills: 6 | Status: SHIPPED | OUTPATIENT
Start: 2018-10-02 | End: 2018-12-07 | Stop reason: ALTCHOICE

## 2018-10-02 RX ORDER — LEVOTHYROXINE SODIUM 0.15 MG/1
150 TABLET ORAL DAILY
Qty: 30 TABLET | Refills: 5 | Status: SHIPPED | OUTPATIENT
Start: 2018-10-02 | End: 2019-04-15 | Stop reason: SDUPTHER

## 2018-10-02 RX ORDER — LEVOTHYROXINE SODIUM 0.12 MG/1
125 TABLET ORAL DAILY
Qty: 30 TABLET | Refills: 11 | Status: SHIPPED | OUTPATIENT
Start: 2018-10-02 | End: 2018-10-02 | Stop reason: SDUPTHER

## 2018-10-02 RX ORDER — ALBUTEROL SULFATE 90 UG/1
2 AEROSOL, METERED RESPIRATORY (INHALATION) EVERY 4 HOURS PRN
Qty: 1 INHALER | Refills: 0 | Status: SHIPPED | OUTPATIENT
Start: 2018-10-02

## 2018-10-02 RX ORDER — FLUTICASONE PROPIONATE 50 MCG
2 SPRAY, SUSPENSION (ML) NASAL DAILY
Qty: 16 G | Refills: 11 | Status: SHIPPED | OUTPATIENT
Start: 2018-10-02

## 2018-10-02 NOTE — PROGRESS NOTES
FAMILY PRACTICE OFFICE VISIT  Alfreda LAY O  [address]  [address] [facility] [address]  [place], [place], [address]      NAME: Gill Blankenship  AGE: 52 y o  SEX: female  : 1965   MRN: [id number]    DATE: 10/2/2018  TIME: 4:13 PM    Assessment and Plan     Problem List Items Addressed This Visit        Unprioritized    Acquired hypothyroidism    Relevant Medications    levothyroxine 125 mcg tablet    Other Relevant Orders    TSH, 3rd generation (Completed)    Asplenia    Relevant Orders    CBC (Completed)    Comprehensive metabolic panel (Completed)    Moderate persistent asthma without complication    Relevant Medications    albuterol (VENTOLIN HFA) 90 mcg/act inhaler    fluticasone (FLOVENT HFA) 110 MCG/ACT inhaler    fluticasone (FLONASE) 50 mcg/act nasal spray    Other Relevant Orders    Comprehensive metabolic panel (Completed)      Other Visit Diagnoses     Well adult health check    -  Primary    Encounter for screening colonoscopy        Relevant Orders    Ambulatory referral to Gastroenterology    Need for influenza vaccination        Relevant Orders    influenza vaccine, [id number], quadrivalent, recombinant, PF, 0 5 mL, for patients 18 yr+ (FLUBLOK) (Completed)    Pain of right great toe        Relevant Orders    Uric acid (Completed)    Encounter for hepatitis C screening test for low risk patient        Relevant Orders    Hepatitis C antibody          Patient Instructions     She is in today for a routine physical/ check up -  Overall healthy 52 yr old  She continues with daily asthmatic symptoms, she will continue on Flovent 2 puffs twice daily, can use rescue inhaler as needed, previously felt no different with Singulair  I would like her to evaluate with an allergist   Last PFTs back in  =mild obstructive ( results under media)    CT of chest was okay , CXR clear at ER , she is a past smoker, quit age 44 ( 1/2 PPD x 30 yrs ) If worsens may require oral steroid  Immunization History   Administered Date(s) Administered    Hib (PRP-OMP) 02/23/2011    Influenza Quadrivalent, 6-35 Months IM 11/12/2015, 10/10/2017    Influenza, recombinant, quadrivalent,injectable, preservative free 10/02/2018    Meningococcal, Unknown Serogroups 09/08/2015, 09/12/2017    Pneumococcal Polysaccharide PPV23 02/23/2011    Td (adult), adsorbed 09/12/2017    Zoster 10/10/2017     She does do yearly Flu shot  ( today)  Tdap/tetanus shot is up to date  (done every 10 yrs for superficial cuts, every 5 yrs for deep wounds)  Hx splenectomy -  Appears UTD w shots      Regarding Colon Cancer screening, we discussed Colonoscopy vs ColoGuard is indicated starting at age 48  Had done remote colonoscopy w polyp but has not redone scope-  concerned cannot swaalow prep  Cologuard not covered  she will set up w GI    Last Pap was here 2015-   Due 11/18 ( set up)     Discussed screening Mammogram, this is up-to-date ( May 2018)     Hepatis C Screening indicated for 'baby boomers' -  after discussion she will do Hepatitis C screen  low risk    We did review previous blood work,  slip given to redo thyroid blood test, she continues on daily thyroid pill  Had other blood work back in February at emergency room, BMP, LFTs were fine, CBC was okay other than white count 11  She is up to date with Lipid screening  She is up to date with Diabetes screening  Discussed importance of routine exercise, healthy diet       Also - should see Dentist routinely, and also should see Eye Dr if any vision changes  She does have ongoing pain left great toe, back in June at urgent care she was told this might be gout, check uric acid level  She has seen Podiatry about a year ago with nail removal, I would like her to re-evaluate with Podiatry    X-ray at urgent care was negative    We will see her again in (12 months PE), (Nov-Dec w/ PAP) sooner as needed          Chief Complaint     Chief Complaint   Patient presents with    Physical Exam       History of Present Illness   Rebeka Hutchison is a 52y o -year-old female who is in today for a regular yearly check, she has been feeling okay other than recent weeks ongoing daily need for rescue inhaler/congestion  She does continue on Flovent 2 puffs twice daily  No fevers or chills  She quit smoking few years ago  Previous no response with Singulair  No new exposure  She had emergency room visits back in February and September due to cough  No acid reflux  Review of Systems   Review of Systems   Constitutional: Negative for appetite change, fatigue, fever and unexpected weight change  HENT: Positive for congestion  Negative for sore throat and trouble swallowing  Respiratory: Positive for cough and wheezing  Negative for chest tightness and shortness of breath  Cardiovascular: Negative for chest pain, palpitations and leg swelling  Gastrointestinal: Negative for abdominal pain and blood in stool  No acid reflux     No change in bowel, never did redo colonoscopy, past history polyp  Feels would be intolerant prep  Cologuard not covered   Genitourinary: Negative for dysuria and hematuria  Had negative Pap 2015, due later this year   Musculoskeletal:        Has pain in right great toe, ongoing, relates she saw Podiatry with nail removal about a year ago  Went to emergency room with this, was told may be gout  Neurological: Negative for dizziness, light-headedness and headaches  Psychiatric/Behavioral: Negative for behavioral problems and confusion         Active Problem List     Patient Active Problem List   Diagnosis    Allergic rhinitis    Asplenia    Moderate persistent asthma without complication    Acquired hypothyroidism    Leukocytosis    Leiomyoma of uterus    History of ITP    Former smoker       Past Medical History:  Past Medical History:   Diagnosis Date    Accidental fall LAST ASSESSED: [id number]    Asthma     Cellulitis of left thigh     LAST ASSESSED: [id number]    Dermatitis     LAST ASSESSED: [id number]    Disease of thyroid gland     History of ITP     LAST ASSESSED: [id number]    Ingrown nail     RESOLVED: [id number]    Personal history of ovarian cyst     Pneumonia     LAST ASSESSED: [id number]    Right cervical radiculopathy     LAST ASSESSED: [id number]       Past Surgical History:  Past Surgical History:   Procedure Laterality Date    SPLENECTOMY  1998    TUBAL LIGATION         Family History:  Family History   Problem Relation Age of Onset    Breast cancer Sister         MALIGNANT NEOPLASM    Diabetes Family        Social History:  Social History     Social History    Marital status: Single     Spouse name: N/A    Number of children: N/A    Years of education: N/A     Occupational History    Not on file  Social History Main Topics    Smoking status: Former Smoker    Smokeless tobacco: Never Used      Comment: QUIT AROUND AGE 46    Alcohol use No    Drug use: No    Sexual activity: Not on file     Other Topics Concern    Not on file     Social History Narrative    EXERCISE: WALKING       Objective     Vitals:    10/02/18 1308   BP: 134/80   BP Location: Left arm   Patient Position: Standing   Cuff Size: Large   Pulse: 66   SpO2: 97%   Weight: 59 3 kg (130 lb 12 8 oz)   Height: 5' (1 524 m)     Body mass index is 25 55 kg/m²  BP Readings from Last 3 Encounters:   10/02/18 134/80   09/27/18 (!) 181/74   06/16/18 154/86       Wt Readings from Last 3 Encounters:   10/02/18 59 3 kg (130 lb 12 8 oz)   09/27/18 54 4 kg (120 lb)   06/16/18 60 3 kg (133 lb)       Physical Exam   Constitutional: She is oriented to person, place, and time  She appears well-developed and well-nourished  No distress  HENT:   Mouth/Throat: Oropharynx is clear and moist    TM clear b/l   Eyes: Conjunctivae are normal  Right eye exhibits no discharge   Left eye exhibits no discharge  No scleral icterus  Neck: Normal range of motion  Neck supple  Carotid bruit is not present  No thyromegaly present  Cardiovascular: Normal rate, regular rhythm and normal heart sounds  No murmur heard  No carotid bruit   Pulmonary/Chest: Effort normal and breath sounds normal  No respiratory distress  She has no wheezes  She has no rales  Initially with mild rhonchi bilateral but clear after cough/deep inspiration   Abdominal: Soft  There is no tenderness  Musculoskeletal: She exhibits no edema  Lymphadenopathy:     She has no cervical adenopathy  Neurological: She is alert and oriented to person, place, and time  No cranial nerve deficit  Coordination normal    Skin: She is not diaphoretic  Psychiatric: She has a normal mood and affect  Her behavior is normal  Judgment and thought content normal    Nursing note and vitals reviewed  ALLERGIES:  Allergies   Allergen Reactions    Ibuprofen Anaphylaxis and Edema    Aspirin Hives       Current Medications     Current Outpatient Prescriptions   Medication Sig Dispense Refill    acetaminophen (TYLENOL) 500 mg tablet Take 2 tablets (1,000 mg total) by mouth every 6 (six) hours as needed (pain/injury) 30 tablet 0    albuterol (VENTOLIN HFA) 90 mcg/act inhaler Inhale 2 puffs every 4 (four) hours as needed for wheezing 1 Inhaler 0    cetirizine (ZyrTEC) 10 mg tablet Take 10 mg by mouth daily        fluticasone (FLONASE) 50 mcg/act nasal spray 2 sprays into each nostril daily 16 g 11    fluticasone (FLOVENT HFA) 110 MCG/ACT inhaler Inhale 2 puffs 2 (two) times a day 1 Inhaler 6    levothyroxine 125 mcg tablet Take 1 tablet (125 mcg total) by mouth daily 30 tablet 11     No current facility-administered medications for this visit                Most recent labs available from [address] [place] [address]   ( others may be available in [facility] / Media sections)  Lab Results   Component Value Date    WBC 8 49 10/02/2018    HGB 15 1 10/02/2018    HCT 44 4 10/02/2018     10/02/2018    ALT 21 10/02/2018    AST 14 10/02/2018     10/02/2018    K 3 9 10/02/2018     10/02/2018    CREATININE 0 92 10/02/2018    BUN 14 10/02/2018    CO2 30 10/02/2018    GLUF 145 (H) 08/08/2017     No results found for: [facility]  Lab Results   Component Value Date    UWS2ETSDSZRM 24 072 (H) 10/02/2018         Orders Placed This Encounter   Procedures    influenza vaccine, [id number], quadrivalent, recombinant, PF, 0 5 mL, for patients 18 yr+ (FLUBLOK)    CBC    Comprehensive metabolic panel    Uric acid    TSH, 3rd generation    Hepatitis C antibody    Ambulatory referral to Gastroenterology         Ragini Agudelo DO

## 2018-10-02 NOTE — PATIENT INSTRUCTIONS
She is in today for a routine physical/ check up -  Overall healthy 52 yr old  She continues with daily asthmatic symptoms, she will continue on Flovent 2 puffs twice daily, can use rescue inhaler as needed, previously felt no different with Singulair  I would like her to evaluate with an allergist   Last PFTs back in 2015 =mild obstructive ( results under media)  CT of chest was okay 2017, CXR clear at ER 2/18, she is a past smoker, quit age 44 ( 1/2 PPD x 30 yrs )   If worsens may require oral steroid  Immunization History   Administered Date(s) Administered    Hib (PRP-OMP) 02/23/2011    Influenza Quadrivalent, 6-35 Months IM 11/12/2015, 10/10/2017    Influenza, recombinant, quadrivalent,injectable, preservative free 10/02/2018    Meningococcal, Unknown Serogroups 09/08/2015, 09/12/2017    Pneumococcal Polysaccharide PPV23 02/23/2011    Td (adult), adsorbed 09/12/2017    Zoster 10/10/2017     She does do yearly Flu shot  ( today)  Tdap/tetanus shot is up to date  (done every 10 yrs for superficial cuts, every 5 yrs for deep wounds)  Hx splenectomy -  Appears UTD w shots      Regarding Colon Cancer screening, we discussed Colonoscopy vs ColoGuard is indicated starting at age 48  Had done remote colonoscopy w polyp but has not redone scope-  concerned cannot swaalow prep  Cologuard not covered  she will set up w GI    Last Pap was here 2015-   Due 11/18 ( set up)     Discussed screening Mammogram, this is up-to-date ( May 2018)     Hepatis C Screening indicated for 'baby boomers' -  after discussion she will do Hepatitis C screen  low risk    We did review previous blood work,  slip given to redo thyroid blood test, she continues on daily thyroid pill  Had other blood work back in February at emergency room, BMP, LFTs were fine, CBC was okay other than white count 11  She is up to date with Lipid screening  She is up to date with Diabetes screening    Discussed importance of routine exercise, healthy diet       Also - should see Dentist routinely, and also should see Eye Dr if any vision changes  She does have ongoing pain left great toe, back in June at urgent care she was told this might be gout, check uric acid level  She has seen Podiatry about a year ago with nail removal, I would like her to re-evaluate with Podiatry    X-ray at urgent care was negative    We will see her again in (12 months PE), (Nov-Dec w/ PAP) sooner as needed

## 2018-10-03 LAB — HCV AB SER QL: NORMAL

## 2018-11-20 ENCOUNTER — OFFICE VISIT (OUTPATIENT)
Dept: GASTROENTEROLOGY | Facility: MEDICAL CENTER | Age: 53
End: 2018-11-20
Payer: COMMERCIAL

## 2018-11-20 VITALS
TEMPERATURE: 97.8 F | DIASTOLIC BLOOD PRESSURE: 70 MMHG | BODY MASS INDEX: 23.55 KG/M2 | HEART RATE: 56 BPM | WEIGHT: 128 LBS | SYSTOLIC BLOOD PRESSURE: 128 MMHG | HEIGHT: 62 IN

## 2018-11-20 DIAGNOSIS — Z12.11 ENCOUNTER FOR SCREENING COLONOSCOPY: ICD-10-CM

## 2018-11-20 PROCEDURE — 99244 OFF/OP CNSLTJ NEW/EST MOD 40: CPT | Performed by: INTERNAL MEDICINE

## 2018-11-20 RX ORDER — ONDANSETRON 4 MG/1
4 TABLET, FILM COATED ORAL EVERY 8 HOURS PRN
Qty: 20 TABLET | Refills: 0 | Status: SHIPPED | OUTPATIENT
Start: 2018-11-20 | End: 2019-03-20

## 2018-11-20 RX ORDER — POLYETHYLENE GLYCOL 3350 17 G/17G
POWDER, FOR SOLUTION ORAL
Qty: 238 G | Refills: 0 | Status: SHIPPED | OUTPATIENT
Start: 2018-11-20 | End: 2019-09-12 | Stop reason: ALTCHOICE

## 2018-11-20 NOTE — PATIENT INSTRUCTIONS
The pt is scheduled at [facility] on 1/30/18 for a colon with Dr Venkatesh Beltran   Miralax/Ducolax prep were gone over with by the PA

## 2018-11-20 NOTE — LETTER
November 20, 2018     Damion Dent DO  [address]    Patient: Rebeca Palmer   YOB: 1965   Date of Visit: 11/20/2018       Dear Dr Kimberly Thorpe: Thank you for referring Sarbjit Lloyd to me for evaluation  Below are my notes for this consultation  If you have questions, please do not hesitate to call me  I look forward to following your patient along with you  Sincerely,    FRANCISCO Tavarez  [facility]  Mobile: [phone]  Available on [employer]  [email or web address]  [email or web address]           CC: No Recipients  Mohini Billy MD  11/20/2018 10:20 AM  Sign at close encounter  [address] [facility] - Outpatient Consultation  Rebeca Palmer 52 y o  female MRN: [id number]  Encounter: [id number]      PCP: Damion Dent DO  Referring: Damion Dent DO  [id number]  [address] [facility]      ASSESSMENT AND PLAN:      1  Encounter for screening colonoscopy  Average risk for colon cancer, We reviewed risks benefits and alternates of colonoscopy  Risks include but not limited to infection, bleeding, perforation, missed lesion  Bowel prep instructions given (miralax/gatorade prep per her preference)  Consent signed  - Ambulatory referral to Gastroenterology  - ondansetron (ZOFRAN) 4 mg tablet; Take 1 tablet (4 mg total) by mouth every 8 (eight) hours as needed for nausea or vomiting  Dispense: 20 tablet; Refill: 0  - Case request operating room: COLONOSCOPY; Standing  - polyethylene glycol (GLYCOLAX) powder; At 5pm take 5mgx2 dulcolax  At 6pm 32oz miralax in 64oz gatorade  Drink 8oz glass every 5 mins until 32oz finished  Drink remaining as rec  Dispense: 238 g; Refill: 0  - Case request operating room: COLONOSCOPY      ______________________________________________________________________    HPI:      Patient is a 52year old female referred for colon cancer screening   She has a past medical history of leiomyoma of the uterus, ITP s/p splenectomy, asthma, and hypothyroidism  She has no family history of colon cancer  She last underwent ?beside sigmoidoscopy (unsedated) at [facility] > 10 years ago for rectal bleeding  She reports intermittent rectal bleeding, small amounts of blood per toilet paper that is associated with bowel movements  She feels associated rectal bulging and is concerned about hemorrhoids  She has 2-3 bowel movements daily without difficulty  She does relate some associated gas/bloating with this  She is concerned about nausea/vomiting in attempt to toelrate large amoutns of liquids required for bowel prep  She denies unintentional weight loss, abdominal pain, irregular bowel habits  REVIEW OF SYSTEMS:    CONSTITUTIONAL: Denies any fever, chills, rigors, and weight loss  HEENT: No earache or tinnitus  Denies hearing loss or visual disturbances  CARDIOVASCULAR: No chest pain or palpitations  RESPIRATORY: Denies any cough, hemoptysis, shortness of breath or dyspnea on exertion  GASTROINTESTINAL: As noted in the History of Present Illness  GENITOURINARY: No problems with urination  Denies any hematuria or dysuria  NEUROLOGIC: No dizziness or vertigo, denies headaches  MUSCULOSKELETAL: Denies any muscle or joint pain  SKIN: Denies skin rashes or itching  ENDOCRINE: Denies excessive thirst  Denies intolerance to heat or cold  PSYCHOSOCIAL: Denies depression or anxiety  Denies any recent memory loss         Historical Information   Past Medical History:   Diagnosis Date    Accidental fall     LAST ASSESSED: [id number]    Asthma     Cellulitis of left thigh     LAST ASSESSED: [id number]    Dermatitis     LAST ASSESSED: [id number]    Disease of thyroid gland     History of ITP     LAST ASSESSED: [id number]    Ingrown nail     RESOLVED: [id number]    Personal history of ovarian cyst     Pneumonia     LAST ASSESSED: [id number]    Right cervical radiculopathy     LAST ASSESSED: [id number]     Past Surgical History:   Procedure Laterality Date    COLONOSCOPY      SPLENECTOMY  1998    TUBAL LIGATION       Social History   History   Alcohol Use No     History   Drug Use No     History   Smoking Status    Former Smoker   Smokeless Tobacco    Never Used     Comment: QUIT AROUND AGE 46     Family History   Problem Relation Age of Onset    Breast cancer Sister         MALIGNANT NEOPLASM    Diabetes Family        Meds/Allergies       Current Outpatient Prescriptions:     acetaminophen (TYLENOL) 500 mg tablet    albuterol (VENTOLIN HFA) 90 mcg/act inhaler    cetirizine (ZyrTEC) 10 mg tablet    fluticasone (FLONASE) 50 mcg/act nasal spray    fluticasone (FLOVENT HFA) 110 MCG/ACT inhaler    levothyroxine 150 mcg tablet    ondansetron (ZOFRAN) 4 mg tablet    polyethylene glycol (GLYCOLAX) powder    Allergies   Allergen Reactions    Ibuprofen Anaphylaxis and Edema    Aspirin Hives           Objective     Blood pressure 128/70, pulse 56, temperature 97 8 °F (36 6 °C), temperature source Tympanic, height 5' 2" (1 575 m), weight 58 1 kg (128 lb)  Body mass index is 23 41 kg/m²  PHYSICAL EXAM:      General Appearance:   Alert, cooperative, no distress   HEENT:   Normocephalic, atraumatic, anicteric      Neck:  Supple, symmetrical, trachea midline   Lungs:   Clear to auscultation bilaterally; no rales, rhonchi or wheezing; respirations unlabored    Heart[de-identified]   Regular rate and rhythm; no murmur, rub, or gallop     Abdomen:   Soft, non-tender, non-distended; normal bowel sounds; no masses, no organomegaly, + midline well healed scar s/p splenectomy    Genitalia:   Deferred    Rectal:   Deferred    Extremities:  No cyanosis, clubbing or edema    Pulses:  2+ and symmetric    Skin:  No jaundice, rashes, or lesions    Lymph nodes:  No palpable cervical lymphadenopathy        Lab Results:     Lab Results   Component Value Date    WBC 8 49 10/02/2018    HGB 15 1 10/02/2018    HCT 44 4 10/02/2018 MCV 92 10/02/2018     10/02/2018       Lab Results   Component Value Date     10/01/2015    K 3 9 10/02/2018     10/02/2018    CO2 30 10/02/2018    ANIONGAP 4 10/01/2015    BUN 14 10/02/2018    CREATININE 0 92 10/02/2018    GLUCOSE 90 10/01/2015    GLUF 145 (H) 08/08/2017    CALCIUM 9 7 10/02/2018    AST 14 10/02/2018    ALT 21 10/02/2018    ALKPHOS 69 10/02/2018    PROT 7 3 10/01/2015    BILITOT 0 72 10/01/2015    EGFR 70 10/02/2018       No results found for: INR, PROTIME      Radiology Results:   No results found

## 2018-11-20 NOTE — PROGRESS NOTES
[facility] - Outpatient Consultation  Natalie Trejo 52 y o  female MRN: [id number]  Encounter: [id number]      PCP: Vida Wilson DO  Referring: Vida Wilson DO  [address]  [address]      ASSESSMENT AND PLAN:      1  Encounter for screening colonoscopy  Average risk for colon cancer, We reviewed risks benefits and alternates of colonoscopy  Risks include but not limited to infection, bleeding, perforation, missed lesion  Bowel prep instructions given (miralax/gatorade prep per her preference)  Consent signed  Will prescribe zofran as needed for preparation, recommend 8 mg PO prior to prep, max 6 tabs (24 mg in 24 hour period)  - Ambulatory referral to Gastroenterology  - ondansetron (ZOFRAN) 4 mg tablet; Take 1 tablet (4 mg total) by mouth every 8 (eight) hours as needed for nausea or vomiting  Dispense: 20 tablet; Refill: 0  - Case request operating room: COLONOSCOPY; Standing  - polyethylene glycol (GLYCOLAX) powder; At 5pm take 5mgx2 dulcolax  At 6pm 32oz miralax in 64oz gatorade  Drink 8oz glass every 5 mins until 32oz finished  Drink remaining as rec  Dispense: 238 g; Refill: 0  - Case request operating room: COLONOSCOPY      ______________________________________________________________________    HPI:      Patient is a 52year old female referred for colon cancer screening  She has a past medical history of leiomyoma of the uterus, ITP s/p splenectomy, asthma, and hypothyroidism  She has no family history of colon cancer  She last underwent ?beside sigmoidoscopy (unsedated) at [facility] > 10 years ago for rectal bleeding  She reports intermittent rectal bleeding, small amounts of blood per toilet paper that is associated with bowel movements  She feels associated rectal bulging and is concerned about hemorrhoids  She has 2-3 bowel movements daily without difficulty  She does relate some associated gas/bloating with this   She is concerned about nausea/vomiting in attempt to toelrate large amoutns of liquids required for bowel prep  She denies unintentional weight loss, abdominal pain, irregular bowel habits  REVIEW OF SYSTEMS:    CONSTITUTIONAL: Denies any fever, chills, rigors, and weight loss  HEENT: No earache or tinnitus  Denies hearing loss or visual disturbances  CARDIOVASCULAR: No chest pain or palpitations  RESPIRATORY: Denies any cough, hemoptysis, shortness of breath or dyspnea on exertion  GASTROINTESTINAL: As noted in the History of Present Illness  GENITOURINARY: No problems with urination  Denies any hematuria or dysuria  NEUROLOGIC: No dizziness or vertigo, denies headaches  MUSCULOSKELETAL: Denies any muscle or joint pain  SKIN: Denies skin rashes or itching  ENDOCRINE: Denies excessive thirst  Denies intolerance to heat or cold  PSYCHOSOCIAL: Denies depression or anxiety  Denies any recent memory loss         Historical Information   Past Medical History:   Diagnosis Date    Accidental fall     LAST ASSESSED: [id number]    Asthma     Cellulitis of left thigh     LAST ASSESSED: [id number]    Dermatitis     LAST ASSESSED: [id number]    Disease of thyroid gland     History of ITP     LAST ASSESSED: [id number]    Ingrown nail     RESOLVED: [id number]    Personal history of ovarian cyst     Pneumonia     LAST ASSESSED: [id number]    Right cervical radiculopathy     LAST ASSESSED: [id number]     Past Surgical History:   Procedure Laterality Date    COLONOSCOPY      SPLENECTOMY  1998    TUBAL LIGATION       Social History   History   Alcohol Use No     History   Drug Use No     History   Smoking Status    Former Smoker   Smokeless Tobacco    Never Used     Comment: QUIT AROUND AGE 46     Family History   Problem Relation Age of Onset    Breast cancer Sister         MALIGNANT NEOPLASM    Diabetes Family        Meds/Allergies       Current Outpatient Prescriptions:     acetaminophen (TYLENOL) 500 mg tablet   albuterol (VENTOLIN HFA) 90 mcg/act inhaler    cetirizine (ZyrTEC) 10 mg tablet    fluticasone (FLONASE) 50 mcg/act nasal spray    fluticasone (FLOVENT HFA) 110 MCG/ACT inhaler    levothyroxine 150 mcg tablet    ondansetron (ZOFRAN) 4 mg tablet    polyethylene glycol (GLYCOLAX) powder    Allergies   Allergen Reactions    Ibuprofen Anaphylaxis and Edema    Aspirin Hives           Objective     Blood pressure 128/70, pulse 56, temperature 97 8 °F (36 6 °C), temperature source Tympanic, height 5' 2" (1 575 m), weight 58 1 kg (128 lb)  Body mass index is 23 41 kg/m²  PHYSICAL EXAM:      General Appearance:   Alert, cooperative, no distress   HEENT:   Normocephalic, atraumatic, anicteric      Neck:  Supple, symmetrical, trachea midline   Lungs:   Clear to auscultation bilaterally; no rales, rhonchi or wheezing; respirations unlabored    Heart[de-identified]   Regular rate and rhythm; no murmur, rub, or gallop     Abdomen:   Soft, non-tender, non-distended; normal bowel sounds; no masses, no organomegaly, + midline well healed scar s/p splenectomy    Genitalia:   Deferred    Rectal:   Deferred    Extremities:  No cyanosis, clubbing or edema    Pulses:  2+ and symmetric    Skin:  No jaundice, rashes, or lesions    Lymph nodes:  No palpable cervical lymphadenopathy        Lab Results:     Lab Results   Component Value Date    WBC 8 49 10/02/2018    HGB 15 1 10/02/2018    HCT 44 4 10/02/2018    MCV 92 10/02/2018     10/02/2018       Lab Results   Component Value Date     10/01/2015    K 3 9 10/02/2018     10/02/2018    CO2 30 10/02/2018    ANIONGAP 4 10/01/2015    BUN 14 10/02/2018    CREATININE 0 92 10/02/2018    GLUCOSE 90 10/01/2015    GLUF 145 (H) 08/08/2017    CALCIUM 9 7 10/02/2018    AST 14 10/02/2018    ALT 21 10/02/2018    ALKPHOS 69 10/02/2018    PROT 7 3 10/01/2015    BILITOT 0 72 10/01/2015    EGFR 70 10/02/2018       No results found for: INR, PROTIME      Radiology Results:   No results found

## 2018-12-07 ENCOUNTER — ANNUAL EXAM (OUTPATIENT)
Dept: FAMILY MEDICINE CLINIC | Facility: CLINIC | Age: 53
End: 2018-12-07
Payer: COMMERCIAL

## 2018-12-07 VITALS
OXYGEN SATURATION: 98 % | BODY MASS INDEX: 23.96 KG/M2 | WEIGHT: 130.2 LBS | HEIGHT: 62 IN | DIASTOLIC BLOOD PRESSURE: 74 MMHG | HEART RATE: 56 BPM | SYSTOLIC BLOOD PRESSURE: 136 MMHG

## 2018-12-07 DIAGNOSIS — Z80.3 FAMILY HISTORY OF BREAST CANCER: ICD-10-CM

## 2018-12-07 DIAGNOSIS — Z12.4 ENCOUNTER FOR SCREENING FOR CERVICAL CANCER: Primary | ICD-10-CM

## 2018-12-07 DIAGNOSIS — Z01.419 ENCOUNTER FOR GYNECOLOGICAL EXAMINATION WITHOUT ABNORMAL FINDING: ICD-10-CM

## 2018-12-07 DIAGNOSIS — L72.0 INCLUSION CYST OF RIGHT BREAST: ICD-10-CM

## 2018-12-07 PROCEDURE — G0145 SCR C/V CYTO,THINLAYER,RESCR: HCPCS | Performed by: FAMILY MEDICINE

## 2018-12-07 PROCEDURE — 99396 PREV VISIT EST AGE 40-64: CPT | Performed by: FAMILY MEDICINE

## 2018-12-07 PROCEDURE — 99213 OFFICE O/P EST LOW 20 MIN: CPT | Performed by: FAMILY MEDICINE

## 2018-12-07 NOTE — PROGRESS NOTES
FAMILY PRACTICE ANNUAL GYNECOLOGY VISIT  [facility]       NAME: Eric Pinzon  AGE: 52 y o  SEX: female       : 1965        MRN: [id number]    DATE: 2018  TIME: 12:58 PM    Await results Pap test, redo 3 years, continue with yearly mammogram, next due in May 2019  She can see breast specialist regarding superficial inclusion cyst right medial breast as it is bothersome  She will be undergoing colonoscopy in January  We did update chart regarding changes medications made by allergist, continue to see them, we should continue to see her yearly  Subjective  HPI:     Eric Pinzon is a 52 y o  female     She is in today for a routine gyn exam    last menstrual period age 34  Has hot flashes  No worse than before  She has no vaginal spotting or bleeding  See recent PE w me-    Did see Allergist-   Multiple allergies      Gynecologic History    Last Pap: 3 yrs ago  Results were: normal  Last mammogram:   Results were: normal-does have family history breast cancer  She does have small inclusion cyst right breast that is bothersome for her, requests evaluation with breast specialist     Has hx fibroids-   Last US    No pelvis c/o       Review of Systems    Review of Systems   Constitutional: Negative for chills and fever  Respiratory: Positive for cough, chest tightness and wheezing  Cardiovascular: Negative for palpitations and leg swelling  Gastrointestinal: Negative for abdominal pain and blood in stool  Has upcoming colonoscopy   Genitourinary: Negative for difficulty urinating, dysuria, hematuria, pelvic pain, vaginal bleeding, vaginal discharge and vaginal pain  Neurological: Positive for light-headedness  Negative for dizziness and headaches  Psychiatric/Behavioral: Negative for behavioral problems            Objective    Vitals:    18 1109   BP: 136/74   BP Location: Left arm   Patient Position: Sitting   Cuff Size: Large   Pulse: 56   SpO2: 98%   Weight: 59 1 kg (130 lb 3 2 oz)   Height: 5' 2" (1 575 m)     Physical Exam   Constitutional: She appears well-developed and well-nourished  No distress  Genitourinary: Vagina normal and uterus normal  No vaginal discharge found  Genitourinary Comments: No adnexal tenderness or mass  Breast exam with no axillary tenderness or glands palpable, no suspicious breast mass/lump, has tiny inclusion cyst medial right breast   No nipple discharge         Assessment and Plan    Problem List Items Addressed This Visit        Unprioritized    Family history of breast cancer      Other Visit Diagnoses     Encounter for screening for cervical cancer     -  Primary    Pap smear performed, do Pap smear every 3 years  Exam unremarkable here today, previous history fibroids, hold off on redo ultrasound at present  Relevant Orders    Liquid-based pap, screening    Inclusion cyst of right breast        As bothersome she requests referral to breast specialist    Relevant Orders    Ambulatory Referral to Breast Surgery    Encounter for gynecological examination without abnormal finding                 Problem List Items Addressed This Visit        Unprioritized    Family history of breast cancer      Other Visit Diagnoses     Encounter for screening for cervical cancer     -  Primary    Pap smear performed, do Pap smear every 3 years  Exam unremarkable here today, previous history fibroids, hold off on redo ultrasound at present  Relevant Orders    Liquid-based pap, screening    Inclusion cyst of right breast        As bothersome she requests referral to breast specialist    Relevant Orders    Ambulatory Referral to Breast Surgery    Encounter for gynecological examination without abnormal finding               She will be undergoing colonoscopy in January      Frederic Woodson DO  12/7/2018  12:58 PM  [facility]

## 2018-12-07 NOTE — PATIENT INSTRUCTIONS
Await results Pap test, redo 3 years, continue with yearly mammogram, next due in May 2019  She can see breast specialist regarding superficial inclusion cyst right medial breast as it is bothersome  She will be undergoing colonoscopy in January  We did update chart regarding changes medications made by allergist, continue to see them, we should continue to see her yearly

## 2018-12-10 ENCOUNTER — APPOINTMENT (OUTPATIENT)
Dept: LAB | Facility: HOSPITAL | Age: 53
End: 2018-12-10
Payer: COMMERCIAL

## 2018-12-10 DIAGNOSIS — E03.9 ACQUIRED HYPOTHYROIDISM: Primary | ICD-10-CM

## 2018-12-10 LAB — TSH SERPL DL<=0.05 MIU/L-ACNC: 1.25 UIU/ML (ref 0.36–3.74)

## 2018-12-10 PROCEDURE — 36415 COLL VENOUS BLD VENIPUNCTURE: CPT | Performed by: FAMILY MEDICINE

## 2018-12-10 PROCEDURE — 84443 ASSAY THYROID STIM HORMONE: CPT | Performed by: FAMILY MEDICINE

## 2018-12-12 LAB
LAB AP GYN PRIMARY INTERPRETATION: NORMAL
Lab: NORMAL
PATH INTERP SPEC-IMP: NORMAL

## 2018-12-14 ENCOUNTER — HOSPITAL ENCOUNTER (EMERGENCY)
Facility: HOSPITAL | Age: 53
Discharge: HOME | End: 2018-12-14
Attending: EMERGENCY MEDICINE
Payer: COMMERCIAL

## 2018-12-14 VITALS
TEMPERATURE: 98.6 F | RESPIRATION RATE: 16 BRPM | DIASTOLIC BLOOD PRESSURE: 86 MMHG | OXYGEN SATURATION: 98 % | SYSTOLIC BLOOD PRESSURE: 150 MMHG | HEART RATE: 57 BPM

## 2018-12-14 DIAGNOSIS — K04.7 DENTAL ABSCESS: Primary | ICD-10-CM

## 2018-12-14 PROCEDURE — 99282 EMERGENCY DEPT VISIT SF MDM: CPT

## 2018-12-14 RX ORDER — AMOXICILLIN 500 MG/1
500 CAPSULE ORAL EVERY 12 HOURS SCHEDULED
Qty: 20 CAPSULE | Refills: 0 | Status: SHIPPED | OUTPATIENT
Start: 2018-12-14 | End: 2018-12-24

## 2018-12-14 RX ORDER — ACETAMINOPHEN 500 MG
500 TABLET ORAL EVERY 6 HOURS PRN
Qty: 30 TABLET | Refills: 0 | Status: SHIPPED | OUTPATIENT
Start: 2018-12-14 | End: 2019-03-20

## 2018-12-14 NOTE — ED PROVIDER NOTES
History  Chief Complaint   Patient presents with    Dental Pain     Report dental pain off and on for the past year  Reports, "My dentist won't see me, I can't wait until February " Confirmed pain has been ongoing for a year d/t a diagnosed cracked tooth  Patient is a 52year old female who presents with dental pain which she states began yesterday when she bit into a piece of meat  Patient reports that she broke the tooth last year and has not had a chance to get the tooth pulled get  Patient states that the tooth would not bother her which is why she did not make an appointment to get it pulled and states she was unable to afford the dental appointment to get it pulled  Patient reports that she does have an appointment coming up in February to get the tooth pulled and needs something for the pain  Patient reports she is allergic to ibuprofen and aspirin and states she has not tried any other over-the-counter medications at this time  Patient reports a fluctuant mass on the lower gum  Patient denies fevers, chills, patient reports the pain does not radiate anywhere and does not cause difficulty swallowing however she does have pain when chewing  Patient reports she will follow up with family care physician and with the dentist             Prior to Admission Medications   Prescriptions Last Dose Informant Patient Reported? Taking?    AYR SALINE NASAL NO-DRIP NA   Yes No   Si spray into each nostril daily at bedtime   acetaminophen (TYLENOL) 500 mg tablet   No No   Sig: Take 2 tablets (1,000 mg total) by mouth every 6 (six) hours as needed (pain/injury)   albuterol (VENTOLIN HFA) 90 mcg/act inhaler   No No   Sig: Inhale 2 puffs every 4 (four) hours as needed for wheezing   cetirizine (ZyrTEC) 10 mg tablet   Yes No   Sig: Take 10 mg by mouth daily     fluticasone (FLONASE) 50 mcg/act nasal spray   No No   Si sprays into each nostril daily   levothyroxine 150 mcg tablet   No No   Sig: Take 1 tablet (150 mcg total) by mouth daily   mometasone-formoterol (DULERA) 200-5 MCG/ACT inhaler   Yes No   Sig: Inhale 2 puffs 2 (two) times a day Rinse mouth after use  ondansetron (ZOFRAN) 4 mg tablet   No No   Sig: Take 1 tablet (4 mg total) by mouth every 8 (eight) hours as needed for nausea or vomiting   polyethylene glycol (GLYCOLAX) powder   No No   Sig: At 5pm take 5mgx2 dulcolax  At 6pm 32oz miralax in 64oz gatorade  Drink 8oz glass every 5 mins until 32oz finished  Drink remaining as rec    tiotropium (SPIRIVA RESPIMAT) 1 25 MCG/ACT AERS inhaler   Yes No   Sig: Inhale 2 puffs daily      Facility-Administered Medications: None       Past Medical History:   Diagnosis Date    Accidental fall     LAST ASSESSED: [id number]    Asthma     Cellulitis of left thigh     LAST ASSESSED: [id number]    Dermatitis     LAST ASSESSED: [id number]    Disease of thyroid gland     History of ITP     LAST ASSESSED: [id number]    Ingrown nail     RESOLVED: [id number]    Personal history of ovarian cyst     Pneumonia     LAST ASSESSED: [id number]    Right cervical radiculopathy     LAST ASSESSED: [id number]       Past Surgical History:   Procedure Laterality Date    COLONOSCOPY      SPLENECTOMY  1998    TUBAL LIGATION         Family History   Problem Relation Age of Onset    Breast cancer Sister         MALIGNANT NEOPLASM    Diabetes Family      I have reviewed and agree with the history as documented  Social History   Substance Use Topics    Smoking status: Former Smoker    Smokeless tobacco: Never Used      Comment: QUIT AROUND AGE 46    Alcohol use No        Review of Systems   Constitutional: Negative for chills, fatigue and fever  HENT: Positive for dental problem  Negative for congestion, ear pain, rhinorrhea and sore throat  Eyes: Negative for redness  Respiratory: Negative for chest tightness and shortness of breath  Cardiovascular: Negative for chest pain and palpitations     Gastrointestinal: Negative for abdominal pain, nausea and vomiting  Genitourinary: Negative for dysuria and hematuria  Musculoskeletal: Negative  Skin: Negative for rash  Neurological: Negative for dizziness, syncope, light-headedness and numbness  Physical Exam  Physical Exam   Constitutional: She is oriented to person, place, and time  She appears well-developed and well-nourished  HENT:   Head: Normocephalic  Mouth/Throat: Dental abscesses and dental caries present  Eyes: No scleral icterus  Cardiovascular: Normal rate and regular rhythm  Pulmonary/Chest: Effort normal and breath sounds normal  No stridor  Abdominal: Soft  She exhibits no distension  There is no tenderness  Musculoskeletal: Normal range of motion  Neurological: She is alert and oriented to person, place, and time  Skin: Skin is warm and dry  Capillary refill takes less than 2 seconds  Psychiatric: She has a normal mood and affect  Nursing note and vitals reviewed        Vital Signs  ED Triage Vitals [12/14/18 0949]   Temperature Pulse Respirations Blood Pressure SpO2   98 6 °F (37 °C) 57 16 150/86 98 %      Temp Source Heart Rate Source Patient Position - Orthostatic VS BP Location FiO2 (%)   Temporal Monitor Sitting Right arm --      Pain Score       Worst Possible Pain           Vitals:    12/14/18 0949   BP: 150/86   Pulse: 57   Patient Position - Orthostatic VS: Sitting       Visual Acuity      ED Medications  Medications - No data to display    Diagnostic Studies  Results Reviewed     None                 No orders to display              Procedures  Procedures       Phone Contacts  ED Phone Contact    ED Course                               MDM  CritCare Time    Disposition  Final diagnoses:   Dental abscess     Time reflects when diagnosis was documented in both MDM as applicable and the Disposition within this note     Time User Action Codes Description Comment    12/14/2018 10:54 AM Jose Luis Sanz Add [K04 7] Dental abscess ED Disposition     ED Disposition Condition Comment    Discharge  Carlos Olivares discharge to home/self care  Condition at discharge: Good        Follow-up Information    None         Patient's Medications   Discharge Prescriptions    ACETAMINOPHEN (TYLENOL) 500 MG TABLET    Take 1 tablet (500 mg total) by mouth every 6 (six) hours as needed for mild pain       Start Date: 12/14/2018End Date: --       Order Dose: 500 mg       Quantity: 30 tablet    Refills: 0    AMOXICILLIN (AMOXIL) 500 MG CAPSULE    Take 1 capsule (500 mg total) by mouth every 12 (twelve) hours for 10 days       Start Date: 12/14/2018End Date: 12/24/2018       Order Dose: 500 mg       Quantity: 20 capsule    Refills: 0     No discharge procedures on file      ED Provider  Electronically Signed by           Anselmo Gerardo PA-C  12/14/18 2004

## 2018-12-14 NOTE — DISCHARGE INSTRUCTIONS
Dental Abscess   WHAT YOU NEED TO KNOW:   A dental abscess is a collection of pus in or around a tooth  A dental abscess is caused by bacteria  The bacteria usually enter the tooth when the enamel (outer part of the tooth) is damaged by tooth decay  Bacteria may also enter the tooth through a break or chip in the tooth, or a cut in the gum  Food particles that are stuck between the teeth for a long time may also lead to an abscess  DISCHARGE INSTRUCTIONS:   Return to the emergency department if:   · You have severe pain  · You have trouble breathing because of pain or swelling  Contact your healthcare provider if:   · Your symptoms get worse, even after treatment  · Your mouth is bleeding  · You cannot eat or drink because of pain or swelling  · Your abscess returns  · You have an injury that causes a crack in your tooth  · You have questions or concerns about your condition or care  Medicines: You may  need any of the following:  · Antibiotics  help treat a bacterial infection  · NSAIDs , such as ibuprofen, help decrease swelling, pain, and fever  This medicine is available with or without a doctor's order  NSAIDs can cause stomach bleeding or kidney problems in certain people  If you take blood thinner medicine, always ask your healthcare provider if NSAIDs are safe for you  Always read the medicine label and follow directions  · Acetaminophen  decreases pain and fever  It is available without a doctor's order  Ask how much to take and how often to take it  Follow directions  Read the labels of all other medicines you are using to see if they also contain acetaminophen, or ask your doctor or pharmacist  Acetaminophen can cause liver damage if not taken correctly  Do not use more than 4 grams (4,000 milligrams) total of acetaminophen in one day  · Prescription pain medicine  may be given  Ask your healthcare provider how to take this medicine safely   Some prescription pain medicines contain acetaminophen  Do not take other medicines that contain acetaminophen without talking to your healthcare provider  Too much acetaminophen may cause liver damage  Prescription pain medicine may cause constipation  Ask your healthcare provider how to prevent or treat constipation  · Take your medicine as directed  Contact your healthcare provider if you think your medicine is not helping or if you have side effects  Tell him of her if you are allergic to any medicine  Keep a list of the medicines, vitamins, and herbs you take  Include the amounts, and when and why you take them  Bring the list or the pill bottles to follow-up visits  Carry your medicine list with you in case of an emergency  Self-care:   · Rinse your mouth every 2 hours with salt water  This will help keep the area clean  · Gently brush your teeth twice a day with a soft tooth brush  This will help keep the area clean  · Eat soft foods as directed  Soft foods may cause less pain  Examples include applesauce, yogurt, and cooked pasta  Ask your healthcare provider how long to follow this instruction  · Apply a warm compress to your tooth or gum  Use a cotton ball or gauze soaked in warm water  Remove the compress in 10 minutes or when it becomes cool  Repeat 3 times a day  Prevent another abscess:   · Brush your teeth at least 2 times a day with fluoride toothpaste  · Use dental floss to clean between your teeth at least once a day  · Rinse your mouth with water or mouthwash after meals and snacks  · Chew sugarless gum after meals and snacks  · Limit foods that are sticky and high in sugar such as raisons  Also limit drinks high in sugar, such as soda  · See your dentist every 6 months for dental cleanings and oral exams  Follow up with your healthcare provider in 24 hours: Your healthcare provider will need to check your teeth and gums   Write down your questions so you remember to ask them during your visits  © 2017 [employer] Information is for End User's use only and may not be sold, redistributed or otherwise used for commercial purposes  All illustrations and images included in CareNotes® are the copyrighted property of [employer]  or [employer]  The above information is an  only  It is not intended as medical advice for individual conditions or treatments  Talk to your doctor, nurse or pharmacist before following any medical regimen to see if it is safe and effective for you

## 2018-12-14 NOTE — ED NOTES
Pt states that she has had dental pain x 1 year in this spot, states it has come and gone several times, states she is seeing her dentist but not until February and they were unable to give her anything for the pain  States she is allergic to ibuprofen and asa so she is limited to taking tylenol at home which has not helped with the pain        Bobbi Wood RN  12/14/18 4507

## 2019-02-05 ENCOUNTER — TELEPHONE (OUTPATIENT)
Dept: GASTROENTEROLOGY | Facility: MEDICAL CENTER | Age: 54
End: 2019-02-05

## 2019-02-05 ENCOUNTER — CONSULT (OUTPATIENT)
Dept: SURGICAL ONCOLOGY | Facility: CLINIC | Age: 54
End: 2019-02-05
Payer: COMMERCIAL

## 2019-02-05 VITALS
RESPIRATION RATE: 14 BRPM | HEART RATE: 50 BPM | TEMPERATURE: 96.6 F | BODY MASS INDEX: 24.66 KG/M2 | HEIGHT: 62 IN | DIASTOLIC BLOOD PRESSURE: 80 MMHG | WEIGHT: 134 LBS | SYSTOLIC BLOOD PRESSURE: 140 MMHG

## 2019-02-05 DIAGNOSIS — Z12.31 SCREENING MAMMOGRAM, ENCOUNTER FOR: ICD-10-CM

## 2019-02-05 DIAGNOSIS — R92.2 DENSE BREAST TISSUE: ICD-10-CM

## 2019-02-05 DIAGNOSIS — Z80.3 FAMILY HISTORY OF BREAST CANCER: ICD-10-CM

## 2019-02-05 DIAGNOSIS — Z12.39 BREAST CANCER SCREENING OTHER THAN MAMMOGRAM: ICD-10-CM

## 2019-02-05 DIAGNOSIS — N60.81 CYST OF SKIN OF RIGHT BREAST: Primary | ICD-10-CM

## 2019-02-05 PROBLEM — R92.30 DENSE BREAST TISSUE: Status: ACTIVE | Noted: 2019-02-05

## 2019-02-05 PROCEDURE — 99244 OFF/OP CNSLTJ NEW/EST MOD 40: CPT | Performed by: SURGERY

## 2019-02-05 RX ORDER — DEXAMETHASONE 4 MG/1
2 TABLET ORAL 2 TIMES DAILY
Refills: 6 | COMMUNITY
Start: 2019-01-10 | End: 2019-09-12 | Stop reason: SDUPTHER

## 2019-02-05 NOTE — LETTER
2019     Shivam Adams, DO  [address] [facility]  [address] [place] [address]    Patient: Grace Aleman   YOB: 1965   Date of Visit: 2019       Dear Dr Autumn Hernandez: Thank you for referring Zak Daniel to me for evaluation  Below are my notes for this consultation  If you have questions, please do not hesitate to call me  I look forward to following your patient along with you  Sincerely,        Slim Oscar MD        CC: No Recipients  Slim Oscar MD  2019 10:41 AM  Sign at close encounter    Breast Consultation-Surgical Oncology     [place] [id number]  [id number]    Name:  Grace Aleman  YOB: 1965  MRN:  [id number]    Assessment/Plan   Diagnoses and all orders for this visit:    Family history of breast cancer    Dense breast tissue  -     US breast screening bilateral complete (ABUS); Future    Screening mammogram, encounter for  -     Mammo screening bilateral w 3d & cad; Future    Breast cancer screening other than mammogram  -     US breast screening bilateral complete (ABUS); Future        HPI: Grace Aleman is a 52y o  year old female who presents with a right breast inclusion cyst  She shares it can get inflamed on occasion  It does not drain unless she tries to express the matter  She denies any breast lumps or nipple discharge  Surgical treatment to date consisted of n/a  Oncology History:     No history exists         Pertinent reproductive history:  Age at menarche:  4  OB History      Para Term  AB Living    3 2            SAB TAB Ectopic Multiple Live Births                     Obstetric Comments    PREVIOUS PREGNANCY - VAGINAL DELIVERY  Menarche : 4  Age at first pregnancy 19  Menopause age 25  Hx of BCP use          Age at first live birth:  19  Age at menopause:  25  Hysterectomy/Oophrectomy:  No  Hormone replacement therapy: No  Birth control pills:  Yes    Problem List:   Patient Active Problem List   Diagnosis    Allergic rhinitis    Asplenia    Moderate persistent asthma without complication    Acquired hypothyroidism    Leukocytosis    Leiomyoma of uterus    History of ITP    Former smoker    Screening mammogram, encounter for    Family history of breast cancer    Dense breast tissue    Breast cancer screening other than mammogram    Cyst of skin of right breast     Past Medical History:   Diagnosis Date    Accidental fall     LAST ASSESSED: [id number]    Asthma     Cellulitis of left thigh     LAST ASSESSED: [id number]    Dermatitis     LAST ASSESSED: [id number]    Disease of thyroid gland     History of ITP     LAST ASSESSED: [id number]    Ingrown nail     RESOLVED: [id number]    Leg pain     Night sweats     Personal history of ovarian cyst     Pneumonia     LAST ASSESSED: [id number]    Right cervical radiculopathy     LAST ASSESSED: [id number]    Shortness of breath      Past Surgical History:   Procedure Laterality Date    COLONOSCOPY      SPLENECTOMY  1998    TUBAL LIGATION       Family History   Problem Relation Age of Onset    Breast cancer Sister 50        MALIGNANT NEOPLASM    Diabetes Family     Breast cancer Maternal Aunt 61    Stomach cancer Maternal Uncle 61    Skin cancer Maternal Uncle         age unk     Social History     Social History    Marital status: Single     Spouse name: N/A    Number of children: N/A    Years of education: N/A     Occupational History    Not on file       Social History Main Topics    Smoking status: Former Smoker    Smokeless tobacco: Never Used      Comment: QUIT AROUND AGE 46    Alcohol use No    Drug use: No    Sexual activity: Not on file     Other Topics Concern    Not on file     Social History Narrative    EXERCISE: WALKING     Current Outpatient Prescriptions   Medication Sig Dispense Refill    albuterol (VENTOLIN HFA) 90 mcg/act inhaler Inhale 2 puffs every 4 (four) hours as needed for wheezing 1 Inhaler 0    AYR SALINE NASAL NO-DRIP NA 1 spray into each nostril daily at bedtime      cetirizine (ZyrTEC) 10 mg tablet Take 10 mg by mouth daily        fluticasone (FLONASE) 50 mcg/act nasal spray 2 sprays into each nostril daily 16 g 11    levothyroxine 150 mcg tablet Take 1 tablet (150 mcg total) by mouth daily 30 tablet 5    mometasone-formoterol (DULERA) 200-5 MCG/ACT inhaler Inhale 2 puffs 2 (two) times a day Rinse mouth after use   tiotropium (SPIRIVA RESPIMAT) 1 25 MCG/ACT AERS inhaler Inhale 2 puffs daily      acetaminophen (TYLENOL) 500 mg tablet Take 2 tablets (1,000 mg total) by mouth every 6 (six) hours as needed (pain/injury) (Patient not taking: Reported on 2/5/2019 ) 30 tablet 0    acetaminophen (TYLENOL) 500 mg tablet Take 1 tablet (500 mg total) by mouth every 6 (six) hours as needed for mild pain (Patient not taking: Reported on 2/5/2019 ) 30 tablet 0    FLOVENT  MCG/ACT inhaler Inhale 2 puffs 2 (two) times a day  6    ondansetron (ZOFRAN) 4 mg tablet Take 1 tablet (4 mg total) by mouth every 8 (eight) hours as needed for nausea or vomiting (Patient not taking: Reported on 2/5/2019 ) 20 tablet 0    polyethylene glycol (GLYCOLAX) powder At 5pm take 5mgx2 dulcolax  At 6pm 32oz miralax in 64oz gatorade  Drink 8oz glass every 5 mins until 32oz finished  Drink remaining as rec  (Patient not taking: Reported on 2/5/2019 ) 238 g 0     No current facility-administered medications for this visit        Allergies   Allergen Reactions    Ibuprofen Anaphylaxis and Edema    Grass Extracts [Gramineae Pollens]     Molds & Smuts     Ragwitek [Short Ragweed Pollen Ext]     Aspirin Hives         The following portions of the patient's history were reviewed and updated as appropriate: allergies, current medications, past family history, past medical history, past social history, past surgical history and problem list     Review of Systems:  Review of Systems   Constitutional: Negative for appetite change and fever  Hot flashes   Eyes: Negative  Respiratory: Positive for cough (Prior smoker) and shortness of breath  Cardiovascular: Negative  Gastrointestinal: Negative  Endocrine: Negative  Genitourinary: Negative  Musculoskeletal: Positive for arthralgias  Negative for myalgias  Skin: Negative  Allergic/Immunologic: Negative  Neurological: Negative  Hematological: Negative  Negative for adenopathy  Does not bruise/bleed easily  Psychiatric/Behavioral: Negative  Physical Exam:  Physical Exam   Constitutional: She is oriented to person, place, and time  She appears well-developed and well-nourished  HENT:   Head: Normocephalic and atraumatic  Cardiovascular: Normal heart sounds  Pulmonary/Chest: Breath sounds normal  Right breast exhibits skin change (Small noninflamed epidermal inclusion cyst in the medial aspect of the right breast)  Right breast exhibits no inverted nipple, no mass, no nipple discharge and no tenderness  Left breast exhibits no inverted nipple, no mass, no nipple discharge, no skin change and no tenderness  Abdominal: Soft  Lymphadenopathy:        Right axillary: No pectoral and no lateral adenopathy present  Left axillary: No pectoral and no lateral adenopathy present  Right: No supraclavicular adenopathy present  Left: No supraclavicular adenopathy present  Neurological: She is alert and oriented to person, place, and time  Psychiatric: She has a normal mood and affect  [email or web address]  janes screening mammogram B1 (3)        Discussion/Summary:  56-year-old female who presents today secondary to a skin cyst in the right breast   She states that this occasionally gets inflamed  She is not sure if it needed to be excised  There is no evidence of inflammation on examination today    I made supportive recommendations to her should this become inflamed  I am more concerned about her family history and dense breast tissue  She does have family history of breast cancer in a sister as well as maternal aunt  Her sister was diagnosed in her 45s  She is not sure if her sister had genetic testing or not  She will discuss this with her  If her sister has not had testing and is not interested, I would then offer testing to [facility]  We also addressed the dense breast tissue  She is a candidate for additional screening in the form of an automated breast ultrasound  She would like to pursue this  I will make these arrangements for her  I will also arrange her mammogram that is due the end of May  I will see her again following this    If there are no concerns at that time, I will then see her on an annual basis along with her gynecologist

## 2019-02-05 NOTE — PROGRESS NOTES
Breast Consultation-Surgical Oncology     [facility]  [facility]  [address] [place] [address]    Name:  Gill Blankenship  YOB: 1965  MRN:  [id number]    Assessment/Plan   Diagnoses and all orders for this visit:    Family history of breast cancer    Dense breast tissue  -     US breast screening bilateral complete (ABUS); Future    Screening mammogram, encounter for  -     Mammo screening bilateral w 3d & cad; Future    Breast cancer screening other than mammogram  -     US breast screening bilateral complete (ABUS); Future        HPI: Gill Blankenship is a 52y o  year old female who presents with a right breast inclusion cyst  She shares it can get inflamed on occasion  It does not drain unless she tries to express the matter  She denies any breast lumps or nipple discharge  Surgical treatment to date consisted of n/a  Oncology History:     No history exists         Pertinent reproductive history:  Age at menarche:  4  OB History      Para Term  AB Living    3 2            SAB TAB Ectopic Multiple Live Births                     Obstetric Comments    PREVIOUS PREGNANCY - VAGINAL DELIVERY  Menarche : 4  Age at first pregnancy 19  Menopause age 25  Hx of BCP use          Age at first live birth:  19  Age at menopause:  25  Hysterectomy/Oophrectomy:  No  Hormone replacement therapy:  No  Birth control pills:  Yes    Problem List:   Patient Active Problem List   Diagnosis    Allergic rhinitis    Asplenia    Moderate persistent asthma without complication    Acquired hypothyroidism    Leukocytosis    Leiomyoma of uterus    History of ITP    Former smoker    Screening mammogram, encounter for    Family history of breast cancer    Dense breast tissue    Breast cancer screening other than mammogram    Cyst of skin of right breast     Past Medical History:   Diagnosis Date    Accidental      LAST ASSESSED:   Asthma     Cellulitis of left thigh     LAST ASSESSED: [id number]    Dermatitis     LAST ASSESSED: [id number]    Disease of thyroid gland     History of ITP     LAST ASSESSED: [id number]    Ingrown nail     RESOLVED: [id number]    Leg pain     Night sweats     Personal history of ovarian cyst     Pneumonia     LAST ASSESSED: [id number]    Right cervical radiculopathy     LAST ASSESSED: [id number]    Shortness of breath      Past Surgical History:   Procedure Laterality Date    COLONOSCOPY      SPLENECTOMY  1998    TUBAL LIGATION       Family History   Problem Relation Age of Onset    Breast cancer Sister 50        MALIGNANT NEOPLASM    Diabetes Family     Breast cancer Maternal Aunt 61    Stomach cancer Maternal Uncle 61    Skin cancer Maternal Uncle         age unk     Social History     Social History    Marital status: Single     Spouse name: N/A    Number of children: N/A    Years of education: N/A     Occupational History    Not on file  Social History Main Topics    Smoking status: Former Smoker    Smokeless tobacco: Never Used      Comment: QUIT AROUND AGE 46    Alcohol use No    Drug use: No    Sexual activity: Not on file     Other Topics Concern    Not on file     Social History Narrative    EXERCISE: WALKING     Current Outpatient Prescriptions   Medication Sig Dispense Refill    albuterol (VENTOLIN HFA) 90 mcg/act inhaler Inhale 2 puffs every 4 (four) hours as needed for wheezing 1 Inhaler 0    AYR SALINE NASAL NO-DRIP NA 1 spray into each nostril daily at bedtime      cetirizine (ZyrTEC) 10 mg tablet Take 10 mg by mouth daily        fluticasone (FLONASE) 50 mcg/act nasal spray 2 sprays into each nostril daily 16 g 11    levothyroxine 150 mcg tablet Take 1 tablet (150 mcg total) by mouth daily 30 tablet 5    mometasone-formoterol (DULERA) 200-5 MCG/ACT inhaler Inhale 2 puffs 2 (two) times a day Rinse mouth after use        tiotropium (SPIRIVA RESPIMAT) 1 25 MCG/ACT AERS inhaler Inhale 2 puffs daily      acetaminophen (TYLENOL) 500 mg tablet Take 2 tablets (1,000 mg total) by mouth every 6 (six) hours as needed (pain/injury) (Patient not taking: Reported on 2/5/2019 ) 30 tablet 0    acetaminophen (TYLENOL) 500 mg tablet Take 1 tablet (500 mg total) by mouth every 6 (six) hours as needed for mild pain (Patient not taking: Reported on 2/5/2019 ) 30 tablet 0    FLOVENT  MCG/ACT inhaler Inhale 2 puffs 2 (two) times a day  6    ondansetron (ZOFRAN) 4 mg tablet Take 1 tablet (4 mg total) by mouth every 8 (eight) hours as needed for nausea or vomiting (Patient not taking: Reported on 2/5/2019 ) 20 tablet 0    polyethylene glycol (GLYCOLAX) powder At 5pm take 5mgx2 dulcolax  At 6pm 32oz miralax in 64oz gatorade  Drink 8oz glass every 5 mins until 32oz finished  Drink remaining as rec  (Patient not taking: Reported on 2/5/2019 ) 238 g 0     No current facility-administered medications for this visit  Allergies   Allergen Reactions    Ibuprofen Anaphylaxis and Edema    Grass Extracts [Gramineae Pollens]     Molds & Smuts     Ragwitek [Short Ragweed Pollen Ext]     Aspirin Hives         The following portions of the patient's history were reviewed and updated as appropriate: allergies, current medications, past family history, past medical history, past social history, past surgical history and problem list     Review of Systems:  Review of Systems   Constitutional: Negative for appetite change and fever  Hot flashes   Eyes: Negative  Respiratory: Positive for cough (Prior smoker) and shortness of breath  Cardiovascular: Negative  Gastrointestinal: Negative  Endocrine: Negative  Genitourinary: Negative  Musculoskeletal: Positive for arthralgias  Negative for myalgias  Skin: Negative  Allergic/Immunologic: Negative  Neurological: Negative  Hematological: Negative  Negative for adenopathy  Does not bruise/bleed easily  Psychiatric/Behavioral: Negative  Physical Exam:  Physical Exam   Constitutional: She is oriented to person, place, and time  She appears well-developed and well-nourished  HENT:   Head: Normocephalic and atraumatic  Cardiovascular: Normal heart sounds  Pulmonary/Chest: Breath sounds normal  Right breast exhibits skin change (Small noninflamed epidermal inclusion cyst in the medial aspect of the right breast)  Right breast exhibits no inverted nipple, no mass, no nipple discharge and no tenderness  Left breast exhibits no inverted nipple, no mass, no nipple discharge, no skin change and no tenderness  Abdominal: Soft  Lymphadenopathy:        Right axillary: No pectoral and no lateral adenopathy present  Left axillary: No pectoral and no lateral adenopathy present  Right: No supraclavicular adenopathy present  Left: No supraclavicular adenopathy present  Neurological: She is alert and oriented to person, place, and time  Psychiatric: She has a normal mood and affect  [email or web address]  janes screening mammogram B1 (3)        Discussion/Summary:  78-year-old female who presents today secondary to a skin cyst in the right breast   She states that this occasionally gets inflamed  She is not sure if it needed to be excised  There is no evidence of inflammation on examination today  I made supportive recommendations to her should this become inflamed  I am more concerned about her family history and dense breast tissue  She does have family history of breast cancer in a sister as well as maternal aunt  Her sister was diagnosed in her 45s  She is not sure if her sister had genetic testing or not  She will discuss this with her  If her sister has not had testing and is not interested, I would then offer testing to [facility]  We also addressed the dense breast tissue  She is a candidate for additional screening in the form of an automated breast ultrasound    She would like to pursue this  I will make these arrangements for her  I will also arrange her mammogram that is due the end of May  I will see her again following this    If there are no concerns at that time, I will then see her on an annual basis along with her gynecologist

## 2019-02-05 NOTE — TELEPHONE ENCOUNTER
Called pt to reschedule cancelled procedure, I scheduled colon  with dr Radha Cabrera at [facility] on 3/12/19  I remailed her instructions to her for miralax prep and [place] packet  Pt is aware she will get a call the day before with exact time of arrival and needs a  to and from

## 2019-02-14 PROBLEM — Z12.11 ENCOUNTER FOR SCREENING COLONOSCOPY: Status: ACTIVE | Noted: 2018-11-20

## 2019-02-22 ENCOUNTER — HOSPITAL ENCOUNTER (OUTPATIENT)
Dept: ULTRASOUND IMAGING | Facility: CLINIC | Age: 54
Discharge: HOME | End: 2019-02-22
Payer: COMMERCIAL

## 2019-02-22 VITALS — HEIGHT: 60 IN | BODY MASS INDEX: 23.56 KG/M2 | WEIGHT: 120 LBS

## 2019-02-22 DIAGNOSIS — R92.2 DENSE BREAST TISSUE: ICD-10-CM

## 2019-02-22 DIAGNOSIS — Z12.39 BREAST CANCER SCREENING OTHER THAN MAMMOGRAM: ICD-10-CM

## 2019-02-22 PROCEDURE — 76641 ULTRASOUND BREAST COMPLETE: CPT

## 2019-02-22 PROCEDURE — 76377 3D RENDER W/INTRP POSTPROCES: CPT

## 2019-03-20 ENCOUNTER — OFFICE VISIT (OUTPATIENT)
Dept: FAMILY MEDICINE CLINIC | Facility: CLINIC | Age: 54
End: 2019-03-20
Payer: COMMERCIAL

## 2019-03-20 VITALS
TEMPERATURE: 98 F | HEIGHT: 62 IN | HEART RATE: 60 BPM | OXYGEN SATURATION: 98 % | DIASTOLIC BLOOD PRESSURE: 98 MMHG | SYSTOLIC BLOOD PRESSURE: 148 MMHG | BODY MASS INDEX: 23.92 KG/M2 | WEIGHT: 130 LBS

## 2019-03-20 DIAGNOSIS — Q89.01 ASPLENIA: ICD-10-CM

## 2019-03-20 DIAGNOSIS — B34.9 VIRAL ILLNESS: Primary | ICD-10-CM

## 2019-03-20 DIAGNOSIS — Z86.2 HISTORY OF ITP: ICD-10-CM

## 2019-03-20 PROCEDURE — 3008F BODY MASS INDEX DOCD: CPT | Performed by: FAMILY MEDICINE

## 2019-03-20 PROCEDURE — 3725F SCREEN DEPRESSION PERFORMED: CPT | Performed by: FAMILY MEDICINE

## 2019-03-20 PROCEDURE — 1036F TOBACCO NON-USER: CPT | Performed by: FAMILY MEDICINE

## 2019-03-20 PROCEDURE — 99213 OFFICE O/P EST LOW 20 MIN: CPT | Performed by: FAMILY MEDICINE

## 2019-03-20 NOTE — PROGRESS NOTES
Assessment/Plan:    She has viral illness which is likely influenza  Will treat symptomatically with increased hydration with water and Gatorade  Also recommended popsicles for nausea and she has some Zofran at home which she may use as well  She may continue with Tylenol for aches and fever  She should contact us if symptoms are not resolving  History of ITP  Her  noticed bruises on the back  With her history of ITP, will check CBC  Diagnoses and all orders for this visit:    Viral illness    Asplenia          Subjective:      Patient ID: Neto Lozano is a 52 y o  female  She is here with complaint of flu-like symptoms which began 3 days ago  Her grandson was diagnosed with the flu this past weekend as well  She complains of body aches, fever and chills, and cough productive of green mucus  She also has nasal congestion  She has taken Tylenol without much relief  She has also had nausea, vomiting and diarrhea  The symptoms are calming down because she has not been eating  Her  noticed bruising of the back as well  She is asplenic and concerned about her platelet count  She denies easy bleeding      The following portions of the patient's history were reviewed and updated as appropriate: allergies, current medications, past family history, past medical history, past social history, past surgical history and problem list     Review of Systems   Constitutional: Positive for activity change, chills, fatigue and fever  HENT: Positive for congestion and sore throat  Negative for ear pain  Respiratory: Positive for cough and chest tightness  Negative for choking  Gastrointestinal: Positive for abdominal pain, diarrhea, nausea and vomiting  Neurological: Positive for headaches  Hematological: Bruises/bleeds easily           Objective:      /98   Pulse 60   Temp 98 °F (36 7 °C) (Tympanic)   Ht 5' 2" (1 575 m)   Wt 59 kg (130 lb)   SpO2 98%   BMI 23 78 kg/m²          Physical Exam   Constitutional: She is oriented to person, place, and time  She appears well-developed and well-nourished  Appears fatigued   HENT:   Head: Normocephalic and atraumatic  Right Ear: External ear normal    Left Ear: External ear normal    Nose with mucosal edema and clear rhinorrhea  Pharynx mildly red without exudate  Neck: Normal range of motion  Neck supple  No thyromegaly present  A few shotty anterior cervical lymph nodes bilaterally  Cardiovascular: Normal rate and regular rhythm  Pulmonary/Chest: Effort normal and breath sounds normal    Neurological: She is alert and oriented to person, place, and time  Skin: Skin is warm and dry  Capillary refill takes less than 2 seconds  There is small ecchymosis mid back  Psychiatric: She has a normal mood and affect  Her behavior is normal    Nursing note and vitals reviewed

## 2019-03-20 NOTE — LETTER
March 20, 2019     Patient: Matilde Avelar   YOB: 1965   Date of Visit: 3/20/2019       To Whom it May Concern:    Madison Huizar is under my professional care  She was seen in my office on 3/20/2019  She may return to work on 03/25/2019  Please excuse her absence from 03/17/2019 to 03/24/2019  If you have any questions or concerns, please don't hesitate to call           Sincerely,          Shakila Miller MD

## 2019-04-15 DIAGNOSIS — E03.9 ACQUIRED HYPOTHYROIDISM: ICD-10-CM

## 2019-04-16 RX ORDER — AMMONIUM LACTATE 12 G/100G
LOTION TOPICAL
COMMUNITY
Start: 2019-04-11 | End: 2021-06-03

## 2019-04-16 RX ORDER — LEVOTHYROXINE SODIUM 0.15 MG/1
TABLET ORAL
Qty: 30 TABLET | Refills: 5 | Status: SHIPPED | OUTPATIENT
Start: 2019-04-16 | End: 2019-06-30 | Stop reason: SDUPTHER

## 2019-06-17 DIAGNOSIS — J45.40 MODERATE PERSISTENT ASTHMA WITHOUT COMPLICATION: ICD-10-CM

## 2019-06-17 RX ORDER — DEXAMETHASONE 4 MG/1
TABLET ORAL
Qty: 1 INHALER | Refills: 6 | Status: SHIPPED | OUTPATIENT
Start: 2019-06-17 | End: 2021-03-25 | Stop reason: ALTCHOICE

## 2019-06-30 DIAGNOSIS — E03.9 ACQUIRED HYPOTHYROIDISM: ICD-10-CM

## 2019-07-01 RX ORDER — LEVOTHYROXINE SODIUM 0.15 MG/1
TABLET ORAL
Qty: 30 TABLET | Refills: 5 | Status: SHIPPED | OUTPATIENT
Start: 2019-07-01 | End: 2020-02-28

## 2019-09-11 PROBLEM — Z12.31 SCREENING MAMMOGRAM, ENCOUNTER FOR: Status: RESOLVED | Noted: 2018-11-20 | Resolved: 2019-09-11

## 2019-09-11 PROBLEM — Z12.11 ENCOUNTER FOR SCREENING COLONOSCOPY: Status: RESOLVED | Noted: 2018-11-20 | Resolved: 2019-09-11

## 2019-09-12 ENCOUNTER — OFFICE VISIT (OUTPATIENT)
Dept: FAMILY MEDICINE CLINIC | Facility: CLINIC | Age: 54
End: 2019-09-12
Payer: COMMERCIAL

## 2019-09-12 VITALS
HEART RATE: 57 BPM | SYSTOLIC BLOOD PRESSURE: 118 MMHG | OXYGEN SATURATION: 97 % | HEIGHT: 62 IN | DIASTOLIC BLOOD PRESSURE: 68 MMHG | WEIGHT: 134.8 LBS | BODY MASS INDEX: 24.8 KG/M2

## 2019-09-12 DIAGNOSIS — Z00.00 WELL ADULT HEALTH CHECK: Primary | ICD-10-CM

## 2019-09-12 DIAGNOSIS — R53.83 FATIGUE, UNSPECIFIED TYPE: ICD-10-CM

## 2019-09-12 DIAGNOSIS — Z13.220 LIPID SCREENING: ICD-10-CM

## 2019-09-12 DIAGNOSIS — E03.9 ACQUIRED HYPOTHYROIDISM: ICD-10-CM

## 2019-09-12 DIAGNOSIS — Q89.01 ASPLENIA: ICD-10-CM

## 2019-09-12 PROBLEM — Z12.39 BREAST CANCER SCREENING OTHER THAN MAMMOGRAM: Status: RESOLVED | Noted: 2019-02-05 | Resolved: 2019-09-12

## 2019-09-12 PROBLEM — D72.829 LEUKOCYTOSIS: Status: RESOLVED | Noted: 2017-03-20 | Resolved: 2019-09-12

## 2019-09-12 PROCEDURE — 99396 PREV VISIT EST AGE 40-64: CPT | Performed by: FAMILY MEDICINE

## 2019-09-12 NOTE — PATIENT INSTRUCTIONS
Reviewed health history along with medication, overall she is doing well here today  We did review previous blood work, October 2018 CBC/CMP/hep C screening was fine, TSH was fine back in December  Slip given to redo fasting blood work ( fatigue)  She is not up to date with Lipid screening  Ordered -   Fine in the past    She is up to date with Diabetes screening  Immunization History   Administered Date(s) Administered    Hib (PRP-OMP) 02/23/2011    Influenza Quadrivalent, 6-35 Months IM 11/12/2015, 10/10/2017    Influenza, recombinant, quadrivalent,injectable, preservative free 10/02/2018    Meningococcal, Unknown Serogroups 09/08/2015, 09/12/2017    Pneumococcal Polysaccharide PPV23 02/23/2011    Td (adult), adsorbed 09/12/2017    Tetanus, adsorbed 12/28/1998    Zoster 06/01/2017, 10/10/2017    Reviewed immunizations, does have history spleen removal   She does do yearly Flu shot  She will do this in October  Tdap/tetanus shot is up to date  (done every 10 yrs for superficial cuts, every 5 yrs for deep wounds)  She did receive Menactra/HIB/ Pneumococcal  in the past      Is a former smoker, quit 5 years ago     Regarding Colon Cancer screening, we discussed Colonoscopy vs ColoGuard is indicated starting at age 36-53  Had done colonoscopy years ago, had gone for colonoscopy evaluation, felt unable to tolerate oral prep, she will contact group that did her prior colonoscopy to see about re-doing a screening  Her last Pap test was performed here in December of 2018, redo December 2021  Discussed screening Mammogram, this is  ordered  Does continue to see breast specialist, Dr Taiwo Borges  Had ultrasound back in February which was okay, missed routine mammogram in May, we put in order for mammogram      Hepatitis C Screening indicated for 'baby boomers' -   previously perfomed and was negative  Continue to try to watch healthy diet, exercise routinely      We will see her again in 12 months, sooner as needed  I did put today's visi in as a yearly physical, last was in October 2018  If denied by insurance will need to rebuild this under routine medical check  She also will continue to see her allergist as is

## 2019-09-12 NOTE — PROGRESS NOTES
FAMILY PRACTICE OFFICE VISIT  [facility]  [address] [facility] [address]  [facility] [address]      NAME: Glendy Sharif  AGE: 62 y o  SEX: female  : 1965   MRN: [id number]    DATE: 2019  TIME: 12:06 PM    Assessment and Plan     Problem List Items Addressed This Visit        Endocrine    Acquired hypothyroidism    Relevant Orders    TSH, 3rd generation       Other    Asplenia    Relevant Orders    CBC      Other Visit Diagnoses     Well adult health check    -  Primary    Fatigue, unspecified type        Relevant Orders    CBC    Comprehensive metabolic panel    TSH, 3rd generation    Urinalysis with microscopic    Lipid screening        Relevant Orders    Lipid panel          Patient Instructions     Reviewed health history along with medication, overall she is doing well here today  We did review previous blood work, 2018 CBC/CMP/hep C screening was fine, TSH was fine back in December  Slip given to redo fasting blood work ( fatigue)  She is not up to date with Lipid screening  Ordered -   Fine in the past    She is up to date with Diabetes screening  Immunization History   Administered Date(s) Administered    Hib (PRP-OMP) 2011    Influenza Quadrivalent, 6-35 Months IM 2015, 10/10/2017    Influenza, recombinant, quadrivalent,injectable, preservative free 10/02/2018    Meningococcal, Unknown Serogroups 2015, 2017    Pneumococcal Polysaccharide PPV23 2011    Td (adult), adsorbed 2017    Tetanus, adsorbed 1998    Zoster 2017, 10/10/2017    Reviewed immunizations, does have history spleen removal   She does do yearly Flu shot  She will do this in October    Tdap/tetanus shot is up to date  (done every 10 yrs for superficial cuts, every 5 yrs for deep wounds)  She did receive Menactra/HIB/ Pneumococcal  in the past      Is a former smoker, quit 5 years ago Regarding Colon Cancer screening, we discussed Colonoscopy vs ColoGuard is indicated starting at age 36-53  Had done colonoscopy years ago, had gone for colonoscopy evaluation, felt unable to tolerate oral prep, she will contact group that did her prior colonoscopy to see about re-doing a screening  Her last Pap test was performed here in December of 2018, redo December 2021  Discussed screening Mammogram, this is  ordered  Does continue to see breast specialist, Dr Saturnino Patten  Had ultrasound back in February which was okay, missed routine mammogram in May, we put in order for mammogram      Hepatitis C Screening indicated for 'baby boomers' -   previously perfomed and was negative  Continue to try to watch healthy diet, exercise routinely  We will see her again in 12 months, sooner as needed  I did put today's visi in as a yearly physical, last was in October 2018  If denied by insurance will need to rebuild this under routine medical check  She also will continue to see her allergist as is  Chief Complaint     Chief Complaint   Patient presents with    Follow-up       History of Present Illness   Sangeeta Sanchez is a 62y o -year-old female who is in today for a routine checkup, she relates she has been feeling okay other than some vague fatigue  She is using medication as directed, does see her allergist     Does admit to some depressive symptoms earlier in year related to her landlord selling the home they were living in, she had to move  Denies current symptoms  Does continue to see breast specialist, did ultrasound back in February but missed appointment for mammogram in May  Remains smoke free for 5 years  Review of Systems   Review of Systems   Constitutional: Positive for fatigue  Negative for appetite change, fever and unexpected weight change  HENT: Negative for sore throat and trouble swallowing      Respiratory: Negative for cough, chest tightness and shortness of breath  Cardiovascular: Negative for chest pain, palpitations and leg swelling  Gastrointestinal: Negative for abdominal pain and blood in stool  No acid reflux     No change in bowel   Genitourinary: Negative for dysuria and hematuria  Neurological: Negative for dizziness, syncope, light-headedness and headaches  Hematological: Does not bruise/bleed easily  Active Problem List     Patient Active Problem List   Diagnosis    Allergic rhinitis    Asplenia    Moderate persistent asthma without complication    Acquired hypothyroidism    Leiomyoma of uterus    History of ITP    Former smoker    Family history of breast cancer    Dense breast tissue    Cyst of skin of right breast       Past Medical History:  Reviewed    Past Surgical History:  Reviewed    Family History:  Reviewed    Social History:  Reviewed    Objective     Vitals:    09/12/19 1038   BP: 118/68   BP Location: Left arm   Patient Position: Sitting   Cuff Size: Large   Pulse: 57   SpO2: 97%   Weight: 61 1 kg (134 lb 12 8 oz)   Height: 5' 2" (1 575 m)     Body mass index is 24 66 kg/m²  BP Readings from Last 3 Encounters:   09/12/19 118/68   03/20/19 148/98   02/05/19 140/80       Wt Readings from Last 3 Encounters:   09/12/19 61 1 kg (134 lb 12 8 oz)   03/20/19 59 kg (130 lb)   02/22/19 54 4 kg (120 lb)       Physical Exam   Constitutional: She is oriented to person, place, and time  She appears well-developed and well-nourished  No distress  HENT:   Mouth/Throat: Oropharynx is clear and moist  No oropharyngeal exudate  TM clear   Eyes: Conjunctivae are normal  No scleral icterus  Cardiovascular: Normal rate, regular rhythm and normal heart sounds  No murmur heard  No carotid bruit   Pulmonary/Chest: Effort normal and breath sounds normal  No respiratory distress  Abdominal: Soft  There is no tenderness  Musculoskeletal: She exhibits no edema  Lymphadenopathy:     She has no cervical adenopathy  Neurological: She is alert and oriented to person, place, and time  Psychiatric: She has a normal mood and affect  Her behavior is normal        ALLERGIES:  Allergies   Allergen Reactions    Ibuprofen Anaphylaxis and Edema    Grass Extracts [Gramineae Pollens]     Molds & Smuts     Ragwitek [Short Ragweed Pollen Ext]     Aspirin Hives       Current Medications     Current Outpatient Medications   Medication Sig Dispense Refill    acetaminophen (TYLENOL) 500 mg tablet Take 2 tablets (1,000 mg total) by mouth every 6 (six) hours as needed (pain/injury) 30 tablet 0    AYR SALINE NASAL NO-DRIP NA 1 spray into each nostril daily at bedtime      cetirizine (ZyrTEC) 10 mg tablet Take 10 mg by mouth daily        FLOVENT  MCG/ACT inhaler TAKE 2 PUFFS BY MOUTH TWICE A DAY 1 Inhaler 6    fluticasone (FLONASE) 50 mcg/act nasal spray 2 sprays into each nostril daily 16 g 11    levothyroxine 150 mcg tablet TAKE 1 TABLET BY MOUTH EVERY DAY 30 tablet 5    tiotropium (SPIRIVA RESPIMAT) 1 25 MCG/ACT AERS inhaler Inhale 2 puffs daily as needed       albuterol (VENTOLIN HFA) 90 mcg/act inhaler Inhale 2 puffs every 4 (four) hours as needed for wheezing 1 Inhaler 0    ammonium lactate (LAC-HYDRIN) 12 % lotion        No current facility-administered medications for this visit               Orders Placed This Encounter   Procedures    CBC    Comprehensive metabolic panel    TSH, 3rd generation    Lipid panel    Urinalysis with microscopic         Sheela Bates DO

## 2019-09-16 ENCOUNTER — HOSPITAL ENCOUNTER (OUTPATIENT)
Dept: MAMMOGRAPHY | Facility: MEDICAL CENTER | Age: 54
Discharge: HOME | End: 2019-09-16
Payer: COMMERCIAL

## 2019-09-16 VITALS — WEIGHT: 134 LBS | BODY MASS INDEX: 24.66 KG/M2 | HEIGHT: 62 IN

## 2019-09-16 DIAGNOSIS — Z12.31 SCREENING MAMMOGRAM, ENCOUNTER FOR: ICD-10-CM

## 2019-09-16 PROCEDURE — 77067 SCR MAMMO BI INCL CAD: CPT

## 2019-09-16 PROCEDURE — 77063 BREAST TOMOSYNTHESIS BI: CPT

## 2019-09-17 ENCOUNTER — APPOINTMENT (OUTPATIENT)
Dept: LAB | Facility: HOSPITAL | Age: 54
End: 2019-09-17
Payer: COMMERCIAL

## 2019-09-17 LAB
ALBUMIN SERPL BCP-MCNC: 3.8 G/DL (ref 3.5–5)
ALP SERPL-CCNC: 78 U/L (ref 46–116)
ALT SERPL W P-5'-P-CCNC: 24 U/L (ref 12–78)
ANION GAP SERPL CALCULATED.3IONS-SCNC: 6 MMOL/L (ref 4–13)
AST SERPL W P-5'-P-CCNC: 14 U/L (ref 5–45)
BACTERIA UR QL AUTO: ABNORMAL /HPF
BILIRUB SERPL-MCNC: 0.4 MG/DL (ref 0.2–1)
BILIRUB UR QL STRIP: NEGATIVE
BUN SERPL-MCNC: 13 MG/DL (ref 5–25)
CALCIUM SERPL-MCNC: 9.5 MG/DL (ref 8.3–10.1)
CHLORIDE SERPL-SCNC: 105 MMOL/L (ref 100–108)
CHOLEST SERPL-MCNC: 259 MG/DL (ref 50–200)
CLARITY UR: ABNORMAL
CO2 SERPL-SCNC: 31 MMOL/L (ref 21–32)
COLOR UR: YELLOW
CREAT SERPL-MCNC: 0.84 MG/DL (ref 0.6–1.3)
ERYTHROCYTE [DISTWIDTH] IN BLOOD BY AUTOMATED COUNT: 15.3 % (ref 11.6–15.1)
GFR SERPL CREATININE-BSD FRML MDRD: 78 ML/MIN/1.73SQ M
GLUCOSE P FAST SERPL-MCNC: 71 MG/DL (ref 65–99)
GLUCOSE UR STRIP-MCNC: NEGATIVE MG/DL
HCT VFR BLD AUTO: 45.3 % (ref 34.8–46.1)
HDLC SERPL-MCNC: 58 MG/DL (ref 40–60)
HGB BLD-MCNC: 15.1 G/DL (ref 11.5–15.4)
HGB UR QL STRIP.AUTO: ABNORMAL
KETONES UR STRIP-MCNC: NEGATIVE MG/DL
LDLC SERPL CALC-MCNC: 175 MG/DL (ref 0–100)
LEUKOCYTE ESTERASE UR QL STRIP: NEGATIVE
MCH RBC QN AUTO: 32 PG (ref 26.8–34.3)
MCHC RBC AUTO-ENTMCNC: 33.3 G/DL (ref 31.4–37.4)
MCV RBC AUTO: 96 FL (ref 82–98)
MUCOUS THREADS UR QL AUTO: ABNORMAL
NITRITE UR QL STRIP: NEGATIVE
NON-SQ EPI CELLS URNS QL MICRO: ABNORMAL /HPF
NONHDLC SERPL-MCNC: 201 MG/DL
PH UR STRIP.AUTO: 5 [PH]
PLATELET # BLD AUTO: 288 THOUSANDS/UL (ref 149–390)
PMV BLD AUTO: 12 FL (ref 8.9–12.7)
POTASSIUM SERPL-SCNC: 3.8 MMOL/L (ref 3.5–5.3)
PROT SERPL-MCNC: 7.3 G/DL (ref 6.4–8.2)
PROT UR STRIP-MCNC: NEGATIVE MG/DL
RBC # BLD AUTO: 4.72 MILLION/UL (ref 3.81–5.12)
RBC #/AREA URNS AUTO: ABNORMAL /HPF
SODIUM SERPL-SCNC: 142 MMOL/L (ref 136–145)
SP GR UR STRIP.AUTO: >=1.03 (ref 1–1.03)
TRIGL SERPL-MCNC: 129 MG/DL
TSH SERPL DL<=0.05 MIU/L-ACNC: 24.96 UIU/ML (ref 0.36–3.74)
UROBILINOGEN UR QL STRIP.AUTO: 0.2 E.U./DL
WBC # BLD AUTO: 9.61 THOUSAND/UL (ref 4.31–10.16)
WBC #/AREA URNS AUTO: ABNORMAL /HPF

## 2019-09-17 PROCEDURE — 36415 COLL VENOUS BLD VENIPUNCTURE: CPT | Performed by: FAMILY MEDICINE

## 2019-09-17 PROCEDURE — 84443 ASSAY THYROID STIM HORMONE: CPT | Performed by: FAMILY MEDICINE

## 2019-09-17 PROCEDURE — 80053 COMPREHEN METABOLIC PANEL: CPT | Performed by: FAMILY MEDICINE

## 2019-09-17 PROCEDURE — 81001 URINALYSIS AUTO W/SCOPE: CPT | Performed by: FAMILY MEDICINE

## 2019-09-17 PROCEDURE — 85027 COMPLETE CBC AUTOMATED: CPT | Performed by: FAMILY MEDICINE

## 2019-09-17 PROCEDURE — 80061 LIPID PANEL: CPT | Performed by: FAMILY MEDICINE

## 2019-10-11 ENCOUNTER — OFFICE VISIT (OUTPATIENT)
Dept: FAMILY MEDICINE CLINIC | Facility: CLINIC | Age: 54
End: 2019-10-11
Payer: COMMERCIAL

## 2019-10-11 VITALS
HEIGHT: 62 IN | BODY MASS INDEX: 25.14 KG/M2 | HEART RATE: 57 BPM | OXYGEN SATURATION: 97 % | SYSTOLIC BLOOD PRESSURE: 124 MMHG | DIASTOLIC BLOOD PRESSURE: 74 MMHG | WEIGHT: 136.6 LBS

## 2019-10-11 DIAGNOSIS — E03.9 ACQUIRED HYPOTHYROIDISM: Primary | ICD-10-CM

## 2019-10-11 PROBLEM — E78.00 HYPERCHOLESTEROLEMIA: Status: ACTIVE | Noted: 2019-10-11

## 2019-10-11 PROCEDURE — 99213 OFFICE O/P EST LOW 20 MIN: CPT | Performed by: FAMILY MEDICINE

## 2019-10-11 PROCEDURE — 3008F BODY MASS INDEX DOCD: CPT | Performed by: FAMILY MEDICINE

## 2019-10-11 NOTE — PATIENT INSTRUCTIONS
We reviewed blood work,   she relates she is using her levothyroxine 150 mcg every day, use it on empty stomach, redo TSH in 6 weeks  May need further dose adjustment    Last October TSH had risen to 24, we increased her dose of levothyroxine to 150 mcg daily at that time, back in December TSH was fine at 1 245  Otherwise treatment as discussed in September, CBC, CMP, urinalysis were fine, cholesterol elevated at 259 but only 2 5% 10 year risk

## 2019-10-11 NOTE — PROGRESS NOTES
FAMILY PRACTICE OFFICE VISIT  [facility]us[facility]  [address], [facility]      NAME: Alice Pineda  AGE: 62 y o  SEX: female  : 1965   MRN: [id number]    DATE: 10/11/2019  TIME: 11:44 AM    Assessment and Plan     Problem List Items Addressed This Visit        Endocrine    Acquired hypothyroidism - Primary    Relevant Orders    TSH, 3rd generation          Patient Instructions   We reviewed blood work,   she relates she is using her levothyroxine 150 mcg every day, use it on empty stomach, redo TSH in 6 weeks  May need further dose adjustment    Last October TSH had risen to 24, we increased her dose of levothyroxine to 150 mcg daily at that time, back in December TSH was fine at 1 245  Otherwise treatment as discussed in September, CBC, CMP, urinalysis were fine, cholesterol elevated at 259 but only 2 5% 10 year risk  The 10-year ASCVD risk score (Rian Sevilla et al , 2013) is: 2 5%    Values used to calculate the score:      Age: 62 years      Sex: Female      Is Non- : No      Diabetic: No      Tobacco smoker: No      Systolic Blood Pressure: 622 mmHg      Is BP treated: No      HDL Cholesterol: 58 mg/dL      Total Cholesterol: 259 mg/dL      Chief Complaint     Chief Complaint   Patient presents with    lab review       History of Present Illness   Alice Pineda is a 62y o -year-old female who is in to discuss elevated TSH, she relates she is using her levothyroxine 150 mcg every day, tries to use it on empty stomach, does take her allergy pill shortly after taking levothyroxine, otherwise does not use with vitamins etc     Last October TSH had risen to 24, we increased her dose of levothyroxine to 150 mcg daily at that time, back in December TSH was fine at 1 245  See other history from visit in September    Feels well other than some fatigue      Review of Systems Review of Systems   Constitutional:        No change review of system versus prior       Active Problem List     Patient Active Problem List   Diagnosis    Allergic rhinitis    Asplenia    Moderate persistent asthma without complication    Acquired hypothyroidism    Leiomyoma of uterus    History of ITP    Former smoker    Family history of breast cancer    Dense breast tissue    Cyst of skin of right breast    Hypercholesterolemia       Past Medical History:  Reviewed    Past Surgical History:  Reviewed    Family History:  Reviewed    Social History:  Reviewed    Objective     Vitals:    10/11/19 1121   BP: 124/74   BP Location: Left arm   Patient Position: Sitting   Cuff Size: Large   Pulse: 57   SpO2: 97%   Weight: 62 kg (136 lb 9 6 oz)   Height: 5' 2" (1 575 m)     Body mass index is 24 98 kg/m²  BP Readings from Last 3 Encounters:   10/11/19 124/74   09/12/19 118/68   03/20/19 148/98       Wt Readings from Last 3 Encounters:   10/11/19 62 kg (136 lb 9 6 oz)   09/16/19 60 8 kg (134 lb)   09/12/19 61 1 kg (134 lb 12 8 oz)       Physical Exam   Constitutional: She appears well-developed and well-nourished  No distress  Neck: No thyromegaly present     Nontender thyroid       ALLERGIES:  Allergies   Allergen Reactions    Ibuprofen Anaphylaxis, Edema and Hives    Grass Extracts [Gramineae Pollens]     Molds & Smuts     Ragwitek [Short Ragweed Pollen Ext]     Aspirin Hives       Current Medications     Current Outpatient Medications   Medication Sig Dispense Refill    acetaminophen (TYLENOL) 500 mg tablet Take 2 tablets (1,000 mg total) by mouth every 6 (six) hours as needed (pain/injury) 30 tablet 0    albuterol (VENTOLIN HFA) 90 mcg/act inhaler Inhale 2 puffs every 4 (four) hours as needed for wheezing 1 Inhaler 0    ammonium lactate (LAC-HYDRIN) 12 % lotion       AYR SALINE NASAL NO-DRIP NA 1 spray into each nostril daily at bedtime      cetirizine (ZyrTEC) 10 mg tablet Take 10 mg by mouth daily        FLOVENT  MCG/ACT inhaler TAKE 2 PUFFS BY MOUTH TWICE A DAY 1 Inhaler 6    fluticasone (FLONASE) 50 mcg/act nasal spray 2 sprays into each nostril daily 16 g 11    levothyroxine 150 mcg tablet TAKE 1 TABLET BY MOUTH EVERY DAY 30 tablet 5    tiotropium (SPIRIVA RESPIMAT) 1 25 MCG/ACT AERS inhaler Inhale 2 puffs daily as needed        No current facility-administered medications for this visit               Orders Placed This Encounter   Procedures    TSH, 3rd generation         Pamela Hercules DO

## 2019-10-15 ENCOUNTER — APPOINTMENT (EMERGENCY)
Dept: RADIOLOGY | Facility: HOSPITAL | Age: 54
End: 2019-10-15
Payer: COMMERCIAL

## 2019-10-15 ENCOUNTER — HOSPITAL ENCOUNTER (EMERGENCY)
Facility: HOSPITAL | Age: 54
Discharge: HOME | End: 2019-10-15
Attending: EMERGENCY MEDICINE
Payer: COMMERCIAL

## 2019-10-15 VITALS
HEART RATE: 70 BPM | WEIGHT: 135.14 LBS | BODY MASS INDEX: 24.72 KG/M2 | RESPIRATION RATE: 18 BRPM | DIASTOLIC BLOOD PRESSURE: 93 MMHG | OXYGEN SATURATION: 98 % | SYSTOLIC BLOOD PRESSURE: 167 MMHG | TEMPERATURE: 97.8 F

## 2019-10-15 DIAGNOSIS — R05.9 COUGH: Primary | ICD-10-CM

## 2019-10-15 PROCEDURE — 99284 EMERGENCY DEPT VISIT MOD MDM: CPT | Performed by: PHYSICIAN ASSISTANT

## 2019-10-15 PROCEDURE — 99283 EMERGENCY DEPT VISIT LOW MDM: CPT

## 2019-10-15 PROCEDURE — 71046 X-RAY EXAM CHEST 2 VIEWS: CPT

## 2019-10-22 NOTE — ED PROVIDER NOTES
History  Chief Complaint   Patient presents with    Cough     productive cough for 3 days  states ribs hurt from coughing     62year old female presents today complaining of cough productive of green sputum for the past 3 days  Bilateral rib pain with coughing  Congestion but no headache or visual changes  Denies fevers, chest pain or shortness of breath, but does admit to some chest tightness as she gets with her asthma  No meds prior to arrival  Reports a history of pneumonia but "this isn't as bad as that time"  Prior to Admission Medications   Prescriptions Last Dose Informant Patient Reported? Taking?    AYR SALINE NASAL NO-DRIP NA  Self Yes No   Si spray into each nostril daily at bedtime   FLOVENT  MCG/ACT inhaler   No No   Sig: TAKE 2 PUFFS BY MOUTH TWICE A DAY   acetaminophen (TYLENOL) 500 mg tablet  Self No No   Sig: Take 2 tablets (1,000 mg total) by mouth every 6 (six) hours as needed (pain/injury)   albuterol (VENTOLIN HFA) 90 mcg/act inhaler  Self No No   Sig: Inhale 2 puffs every 4 (four) hours as needed for wheezing   ammonium lactate (LAC-HYDRIN) 12 % lotion   Yes No   cetirizine (ZyrTEC) 10 mg tablet  Self Yes No   Sig: Take 10 mg by mouth daily     fluticasone (FLONASE) 50 mcg/act nasal spray  Self No No   Si sprays into each nostril daily   levothyroxine 150 mcg tablet   No No   Sig: TAKE 1 TABLET BY MOUTH EVERY DAY   tiotropium (SPIRIVA RESPIMAT) 1 25 MCG/ACT AERS inhaler  Self Yes No   Sig: Inhale 2 puffs daily as needed       Facility-Administered Medications: None       Past Medical History:   Diagnosis Date    Accidental fall     LAST ASSESSED: [id number]    Asthma     Cellulitis of left thigh     LAST ASSESSED: [id number]    Dermatitis     LAST ASSESSED: [id number]    Disease of thyroid gland     History of ITP     LAST ASSESSED: [id number]    Ingrown nail     RESOLVED: [id number]    Leg pain     Night sweats     Personal history of ovarian cyst     Pneumonia LAST ASSESSED: [id number]    Right cervical radiculopathy     LAST ASSESSED: [id number]    Shortness of breath        Past Surgical History:   Procedure Laterality Date    COLONOSCOPY      SPLENECTOMY  1998    TUBAL LIGATION         Family History   Problem Relation Age of Onset    Breast cancer Sister 50        MALIGNANT NEOPLASM    Diabetes Family     Breast cancer Maternal Aunt 61    Stomach cancer Maternal Uncle 61    Skin cancer Maternal Uncle         age unk   [employer] No Known Problems Daughter     No Known Problems Paternal Aunt     No Known Problems Mother      I have reviewed and agree with the history as documented  Social History     Tobacco Use    Smoking status: Former Smoker     Last attempt to quit: 2014     Years since quittin 8    Smokeless tobacco: Never Used    Tobacco comment: QUIT AROUND AGE 46   Substance Use Topics    Alcohol use: No    Drug use: No        Review of Systems   HENT: Positive for congestion  Respiratory: Positive for cough and chest tightness  Rib pain   All other systems reviewed and are negative  Physical Exam  Physical Exam   Constitutional: She appears well-developed and well-nourished  No distress  HENT:   Head: Normocephalic and atraumatic  Mouth/Throat: Oropharynx is clear and moist  No oropharyngeal exudate  Eyes: Conjunctivae are normal    Cardiovascular: Normal rate and regular rhythm  Pulmonary/Chest: Effort normal and breath sounds normal  She has no wheezes  She has no rales  Decreased breath sounds bilaterally   Musculoskeletal: Normal range of motion  Neurological: She is alert  Skin: Skin is warm and dry  Capillary refill takes less than 2 seconds  No rash noted  She is not diaphoretic         Vital Signs  ED Triage Vitals [10/15/19 1606]   Temperature Pulse Respirations Blood Pressure SpO2   97 8 °F (36 6 °C) 70 18 167/93 98 %      Temp Source Heart Rate Source Patient Position - Orthostatic VS BP Location FiO2 (%)   Oral Monitor Sitting Right arm --      Pain Score       --           Vitals:    10/15/19 1606   BP: 167/93   Pulse: 70   Patient Position - Orthostatic VS: Sitting         Visual Acuity      ED Medications  Medications - No data to display    Diagnostic Studies  Results Reviewed     None                 XR chest 2 views   Final Result by Kemi De Paz MD (10/15 1737)      1  No acute cardiopulmonary disease  2   Calcific tendinopathy of the right rotator cuff            Workstation performed: [id number]                    Procedures  Procedures       ED Course                               MDM    Disposition  Final diagnoses:   Cough     Time reflects when diagnosis was documented in both MDM as applicable and the Disposition within this note     Time User Action Codes Description Comment    10/15/2019  6:00 PM Arianna Tyson Add [R05] Cough       ED Disposition     ED Disposition Condition Date/Time Comment    Discharge Stable Tue Oct 15, 2019  6:00 PM Dorian Yousif discharge to home/self care  Follow-up Information     Follow up With Specialties Details Why Contact Info    Maia Daniels DO Family Medicine Schedule an appointment as soon as possible for a visit   [address] [place] [address] Road  [phone]            Discharge Medication List as of 10/15/2019  6:00 PM      CONTINUE these medications which have NOT CHANGED    Details   acetaminophen (TYLENOL) 500 mg tablet Take 2 tablets (1,000 mg total) by mouth every 6 (six) hours as needed (pain/injury), Starting Thu 3/15/2018, Normal      albuterol (VENTOLIN HFA) 90 mcg/act inhaler Inhale 2 puffs every 4 (four) hours as needed for wheezing, Starting Tue 10/2/2018, Normal      ammonium lactate (LAC-HYDRIN) 12 % lotion Starting Thu 4/11/2019, Historical Med      AYR SALINE NASAL NO-DRIP NA 1 spray into each nostril daily at bedtime, Historical Med      cetirizine (ZyrTEC) 10 mg tablet Take 10 mg by mouth daily  , Starting Fri 2/27/2015, Historical Med      FLOVENT  MCG/ACT inhaler TAKE 2 PUFFS BY MOUTH TWICE A DAY, Normal      fluticasone (FLONASE) 50 mcg/act nasal spray 2 sprays into each nostril daily, Starting Tue 10/2/2018, Normal      levothyroxine 150 mcg tablet TAKE 1 TABLET BY MOUTH EVERY DAY, Normal      tiotropium (SPIRIVA RESPIMAT) 1 25 MCG/ACT AERS inhaler Inhale 2 puffs daily as needed , Historical Med           No discharge procedures on file      ED Provider  Electronically Signed by           Eugenia Jeffries PA-C  10/22/19 9680

## 2020-02-28 ENCOUNTER — APPOINTMENT (OUTPATIENT)
Dept: LAB | Facility: HOSPITAL | Age: 55
End: 2020-02-28
Payer: COMMERCIAL

## 2020-02-28 DIAGNOSIS — E03.9 ACQUIRED HYPOTHYROIDISM: ICD-10-CM

## 2020-02-28 DIAGNOSIS — E03.9 ACQUIRED HYPOTHYROIDISM: Primary | ICD-10-CM

## 2020-02-28 LAB — TSH SERPL DL<=0.05 MIU/L-ACNC: 0.09 UIU/ML (ref 0.36–3.74)

## 2020-02-28 PROCEDURE — 36415 COLL VENOUS BLD VENIPUNCTURE: CPT

## 2020-02-28 PROCEDURE — 84443 ASSAY THYROID STIM HORMONE: CPT

## 2020-02-28 RX ORDER — LEVOTHYROXINE SODIUM 0.15 MG/1
TABLET ORAL
Qty: 30 TABLET | Refills: 5
Start: 2020-02-28 | End: 2020-05-11

## 2020-03-23 ENCOUNTER — APPOINTMENT (EMERGENCY)
Dept: RADIOLOGY | Facility: HOSPITAL | Age: 55
End: 2020-03-23
Payer: COMMERCIAL

## 2020-03-23 ENCOUNTER — HOSPITAL ENCOUNTER (EMERGENCY)
Facility: HOSPITAL | Age: 55
Discharge: HOME | End: 2020-03-23
Attending: EMERGENCY MEDICINE | Admitting: EMERGENCY MEDICINE
Payer: COMMERCIAL

## 2020-03-23 VITALS
DIASTOLIC BLOOD PRESSURE: 69 MMHG | RESPIRATION RATE: 20 BRPM | OXYGEN SATURATION: 98 % | TEMPERATURE: 97.7 F | HEART RATE: 65 BPM | SYSTOLIC BLOOD PRESSURE: 160 MMHG

## 2020-03-23 DIAGNOSIS — M25.511 ACUTE PAIN OF RIGHT SHOULDER: Primary | ICD-10-CM

## 2020-03-23 PROCEDURE — 99283 EMERGENCY DEPT VISIT LOW MDM: CPT

## 2020-03-23 PROCEDURE — 99283 EMERGENCY DEPT VISIT LOW MDM: CPT | Performed by: EMERGENCY MEDICINE

## 2020-03-23 RX ORDER — LIDOCAINE 50 MG/G
1 PATCH TOPICAL DAILY
Qty: 30 PATCH | Refills: 0 | Status: SHIPPED | OUTPATIENT
Start: 2020-03-23 | End: 2020-09-14 | Stop reason: ALTCHOICE

## 2020-03-23 RX ORDER — ACETAMINOPHEN 325 MG/1
975 TABLET ORAL ONCE
Status: COMPLETED | OUTPATIENT
Start: 2020-03-23 | End: 2020-03-23

## 2020-03-23 RX ORDER — SENNOSIDES 8.6 MG
650 CAPSULE ORAL EVERY 8 HOURS PRN
Qty: 30 TABLET | Refills: 0 | Status: SHIPPED | OUTPATIENT
Start: 2020-03-23 | End: 2021-06-03

## 2020-03-23 RX ORDER — LIDOCAINE 50 MG/G
1 PATCH TOPICAL ONCE
Status: DISCONTINUED | OUTPATIENT
Start: 2020-03-23 | End: 2020-03-23 | Stop reason: HOSPADM

## 2020-03-23 RX ADMIN — LIDOCAINE 1 PATCH: 50 PATCH TOPICAL at 01:24

## 2020-03-23 RX ADMIN — ACETAMINOPHEN 975 MG: 325 TABLET ORAL at 01:23

## 2020-03-23 NOTE — ED PROVIDER NOTES
History  Chief Complaint   Patient presents with    Shoulder Pain     pt  reports right shoulder pain that started yesterday  pt  denies injury at this time  tylenol for pain yesterday     Pt is a 62year old female presenting right shoulder pain x 1 day  Pt states she was cleaning yesterday and laid down for a nap  States that when she woke up she had atraumatic right shoulder pain anteriorly and laterally that radiates down her right arm  Associated numbness  Denies prior injuries  States that since she has not been able to lift her arm because of the pain with decreased ROM  Denies fevers, neck pain or injury, chest pain, SOB, n/v, sweating  Prior to Admission Medications   Prescriptions Last Dose Informant Patient Reported? Taking?    AYR SALINE NASAL NO-DRIP NA  Self Yes No   Si spray into each nostril daily at bedtime   FLOVENT  MCG/ACT inhaler   No No   Sig: TAKE 2 PUFFS BY MOUTH TWICE A DAY   acetaminophen (TYLENOL) 500 mg tablet  Self No No   Sig: Take 2 tablets (1,000 mg total) by mouth every 6 (six) hours as needed (pain/injury)   albuterol (VENTOLIN HFA) 90 mcg/act inhaler  Self No No   Sig: Inhale 2 puffs every 4 (four) hours as needed for wheezing   ammonium lactate (LAC-HYDRIN) 12 % lotion   Yes No   cetirizine (ZyrTEC) 10 mg tablet  Self Yes No   Sig: Take 10 mg by mouth daily     fluticasone (FLONASE) 50 mcg/act nasal spray  Self No No   Si sprays into each nostril daily   levothyroxine 150 mcg tablet   No No   Sig: Use one pill daily 6 times a week ( sip once a week)   tiotropium (SPIRIVA RESPIMAT) 1 25 MCG/ACT AERS inhaler  Self Yes No   Sig: Inhale 2 puffs daily as needed       Facility-Administered Medications: None       Past Medical History:   Diagnosis Date    Accidental fall     LAST ASSESSED: [id number]    Asthma     Cellulitis of left thigh     LAST ASSESSED: [id number]    Dermatitis     LAST ASSESSED: [id number]    Disease of thyroid gland     History of ITP LAST ASSESSED: [id number]    Ingrown nail     RESOLVED: [id number]    Leg pain     Night sweats     Personal history of ovarian cyst     Pneumonia     LAST ASSESSED: [id number]    Right cervical radiculopathy     LAST ASSESSED: [id number]    Shortness of breath        Past Surgical History:   Procedure Laterality Date    COLONOSCOPY      SPLENECTOMY  1998    TUBAL LIGATION         Family History   Problem Relation Age of Onset    Breast cancer Sister 50        MALIGNANT NEOPLASM    Diabetes Family     Breast cancer Maternal Aunt 61    Stomach cancer Maternal Uncle 61    Skin cancer Maternal Uncle         age [de-identified]   [facility] No Known Problems Daughter     No Known Problems Paternal Aunt     No Known Problems Mother      I have reviewed and agree with the history as documented  E-Cigarette/Vaping    E-Cigarette Use Never User      E-Cigarette/Vaping Substances     Social History     Tobacco Use    Smoking status: Former Smoker     Last attempt to quit: 2014     Years since quittin 2    Smokeless tobacco: Never Used    Tobacco comment: QUIT AROUND AGE 46   Substance Use Topics    Alcohol use: No    Drug use: No       Review of Systems   Constitutional: Negative  HENT: Negative  Respiratory: Negative  Cardiovascular: Negative  Gastrointestinal: Negative  Genitourinary: Negative  Musculoskeletal: Positive for arthralgias and myalgias  Negative for joint swelling  Neurological: Negative  All other systems reviewed and are negative  Physical Exam  Physical Exam   Constitutional: She is oriented to person, place, and time  She appears well-developed and well-nourished  No distress  HENT:   Head: Normocephalic and atraumatic  Right Ear: External ear normal    Left Ear: External ear normal    Nose: Nose normal    Eyes: Conjunctivae and EOM are normal    Neck: Normal range of motion  Neck supple     Cardiovascular: Normal rate, regular rhythm, normal heart sounds and intact distal pulses  Pulses:       Dorsalis pedis pulses are 2+ on the right side, and 2+ on the left side  Pulmonary/Chest: Effort normal and breath sounds normal    Musculoskeletal:        Right shoulder: She exhibits decreased range of motion, tenderness, pain and decreased strength  She exhibits no bony tenderness, no swelling, no effusion, no crepitus, no deformity, no laceration, no spasm and normal pulse  Left shoulder: Normal         Right elbow: Normal        Left elbow: Normal         Right wrist: Normal         Left wrist: Normal    Neurovascularly in tact distally  Neurological: She is alert and oriented to person, place, and time  No sensory deficit  Skin: Skin is warm and dry  She is not diaphoretic  Vital Signs  ED Triage Vitals   Temperature Pulse Respirations Blood Pressure SpO2   03/23/20 0022 03/23/20 0021 03/23/20 0021 03/23/20 0021 03/23/20 0021   97 7 °F (36 5 °C) 65 20 160/69 98 %      Temp Source Heart Rate Source Patient Position - Orthostatic VS BP Location FiO2 (%)   03/23/20 0022 03/23/20 0021 03/23/20 0021 03/23/20 0021 --   Oral Monitor Sitting Right arm       Pain Score       03/23/20 0021       Worst Possible Pain           Vitals:    03/23/20 0021   BP: 160/69   Pulse: 65   Patient Position - Orthostatic VS: Sitting         Visual Acuity      ED Medications  Medications   acetaminophen (TYLENOL) tablet 975 mg (975 mg Oral Given 3/23/20 0123)       Diagnostic Studies  Results Reviewed     None                 No orders to display              Procedures  Procedures         ED Course  ED Course as of Mar 23 0531   Mon Mar 23, 2020   0216 Pt is feeling better with Lidocaine patch and Tylenol  Offered XR, but there is wait time  She would like to be discharged without it  Bony involvement is unlikely anyway  Follow up with PCP                                      MDM      Disposition  Final diagnoses:   Acute pain of right shoulder     Time reflects when diagnosis was documented in both MDM as applicable and the Disposition within this note     Time User Action Codes Description Comment    3/23/2020  2:17 AM Giovanni PAGAN Add [M25 020] Acute pain of right shoulder       ED Disposition     ED Disposition Condition Date/Time Comment    Discharge Good Mon Mar 23, 2020  2:17 AM Audrey Hurley discharge to home/self care  Follow-up Information     Follow up With Specialties Details Why Contact Info    Kiley Huntley DO Family Medicine Schedule an appointment as soon as possible for a visit  As needed [address] [place] [address]    [address] [place] [address]  [phone]            Discharge Medication List as of 3/23/2020  2:18 AM      START taking these medications    Details   acetaminophen (TYLENOL) 650 mg CR tablet Take 1 tablet (650 mg total) by mouth every 8 (eight) hours as needed for mild pain, Starting Mon 3/23/2020, Print      lidocaine (LIDODERM) 5 % Apply 1 patch topically daily Remove & Discard patch within 12 hours or as directed by MD, Starting Mon 3/23/2020, Print         CONTINUE these medications which have NOT CHANGED    Details   acetaminophen (TYLENOL) 500 mg tablet Take 2 tablets (1,000 mg total) by mouth every 6 (six) hours as needed (pain/injury), Starting Thu 3/15/2018, Normal      albuterol (VENTOLIN HFA) 90 mcg/act inhaler Inhale 2 puffs every 4 (four) hours as needed for wheezing, Starting Tue 10/2/2018, Normal      ammonium lactate (LAC-HYDRIN) 12 % lotion Starting Thu 4/11/2019, Historical Med      AYR SALINE NASAL NO-DRIP NA 1 spray into each nostril daily at bedtime, Historical Med      cetirizine (ZyrTEC) 10 mg tablet Take 10 mg by mouth daily  , Starting Fri 2/27/2015, Historical Med      FLOVENT  MCG/ACT inhaler TAKE 2 PUFFS BY MOUTH TWICE A DAY, Normal      fluticasone (FLONASE) 50 mcg/act nasal spray 2 sprays into each nostril daily, Starting Tue 10/2/2018, Normal      levothyroxine 150 mcg tablet Use one pill daily 6 times a week ( sip once a week), No Print      tiotropium (SPIRIVA RESPIMAT) 1 25 MCG/ACT AERS inhaler Inhale 2 puffs daily as needed , Historical Med           No discharge procedures on file      PDMP Review     None          ED Provider  Electronically Signed by           Sammi Kenyon PA-C  03/23/20 [id number]

## 2020-03-24 ENCOUNTER — OFFICE VISIT (OUTPATIENT)
Dept: FAMILY MEDICINE CLINIC | Facility: CLINIC | Age: 55
End: 2020-03-24
Payer: COMMERCIAL

## 2020-03-24 VITALS
BODY MASS INDEX: 26.7 KG/M2 | DIASTOLIC BLOOD PRESSURE: 90 MMHG | WEIGHT: 136 LBS | HEIGHT: 60 IN | TEMPERATURE: 98.4 F | SYSTOLIC BLOOD PRESSURE: 150 MMHG | OXYGEN SATURATION: 97 % | HEART RATE: 66 BPM

## 2020-03-24 DIAGNOSIS — Z90.81 H/O SPLENECTOMY: ICD-10-CM

## 2020-03-24 DIAGNOSIS — R20.0 NUMBNESS AND TINGLING OF RIGHT ARM: ICD-10-CM

## 2020-03-24 DIAGNOSIS — J02.9 SORE THROAT: ICD-10-CM

## 2020-03-24 DIAGNOSIS — M79.2 NEURALGIA OF RIGHT UPPER EXTREMITY: Primary | ICD-10-CM

## 2020-03-24 DIAGNOSIS — M75.51 DELTOID BURSITIS, RIGHT: ICD-10-CM

## 2020-03-24 DIAGNOSIS — E03.9 ACQUIRED HYPOTHYROIDISM: ICD-10-CM

## 2020-03-24 DIAGNOSIS — R20.2 NUMBNESS AND TINGLING OF RIGHT ARM: ICD-10-CM

## 2020-03-24 LAB — S PYO AG THROAT QL: NEGATIVE

## 2020-03-24 PROCEDURE — 3008F BODY MASS INDEX DOCD: CPT | Performed by: FAMILY MEDICINE

## 2020-03-24 PROCEDURE — 1036F TOBACCO NON-USER: CPT | Performed by: FAMILY MEDICINE

## 2020-03-24 PROCEDURE — 87880 STREP A ASSAY W/OPTIC: CPT | Performed by: FAMILY MEDICINE

## 2020-03-24 PROCEDURE — 99214 OFFICE O/P EST MOD 30 MIN: CPT | Performed by: FAMILY MEDICINE

## 2020-03-24 RX ORDER — TRAMADOL HYDROCHLORIDE 50 MG/1
50 TABLET ORAL EVERY 6 HOURS PRN
Qty: 20 TABLET | Refills: 0 | Status: SHIPPED | OUTPATIENT
Start: 2020-03-24 | End: 2020-09-13 | Stop reason: ALTCHOICE

## 2020-03-24 RX ORDER — PREDNISONE 10 MG/1
TABLET ORAL
Qty: 30 TABLET | Refills: 0 | Status: SHIPPED | OUTPATIENT
Start: 2020-03-24 | End: 2020-06-12 | Stop reason: SDUPTHER

## 2020-03-24 NOTE — PROGRESS NOTES
FAMILY PRACTICE OFFICE VISIT  [facility]  [address], [address]      NAME: Kwadwo Gamez  AGE: 62 y o  SEX: female  : 1965   MRN: [id number]    DATE: 3/24/2020  TIME: 9:49 AM    Assessment and Plan     Problem List Items Addressed This Visit        Endocrine    Acquired hypothyroidism    Relevant Medications    predniSONE 10 mg tablet       Other    H/O splenectomy ( ITP)      Other Visit Diagnoses     Neuralgia of right upper extremity    -  Primary    Relevant Medications    predniSONE 10 mg tablet    traMADol (ULTRAM) 50 mg tablet    Deltoid bursitis, right        Relevant Medications    predniSONE 10 mg tablet    traMADol (ULTRAM) 50 mg tablet    Numbness and tingling of right arm        Relevant Medications    predniSONE 10 mg tablet    Sore throat        Relevant Orders    POCT rapid strepA (Completed)          Patient Instructions     Severe neuralgia right upper extremity, cervical radicular verses axillary verses bursitis  She will use prednisone course, can use tramadol every 6 hours as needed along with Tylenol/lidocaine  If notes increased weakness she should be re-evaluated at emergency room  Regarding sore throat strep screen is negative here today, observe  Note does have remote history splenectomy due to ITP  Watch for fever  She does have slip to redo TSH mid April, currently on 150 mcg 6 days weekly  No increased asthmatic complaints, stay on Flovent  Chief Complaint     Chief Complaint   Patient presents with    Sore Throat     X 2 weeks    Shoulder Pain     right shoulder pain for 1 week worsening , went to ER c/o worsening pain       History of Present Illness   Kwadwo Gamez is a 62y o -year-old female who Has 2 problems here today, her grandson did have strep throat 2 weeks ago, shortly after that she developed a sore throat, no fever, no cough    No recent travel  She also developed pain right shoulder which radiates down arm, numbness forearm /hand for the past 4 days, onset after she awoke from sleep  She is right handed, no trauma  She has no neck pain  No back pain  No rash  Review of Systems   Review of Systems   Constitutional: Negative for appetite change, fatigue, fever and unexpected weight change  HENT: Positive for sore throat  Negative for congestion, sinus pressure, sinus pain and trouble swallowing  Respiratory: Negative for cough, chest tightness and shortness of breath  Cardiovascular: Negative for chest pain, palpitations and leg swelling  Gastrointestinal: Negative for abdominal pain, blood in stool, nausea and vomiting  No acid reflux     No change in bowel   Genitourinary: Negative for dysuria and hematuria  Musculoskeletal: Negative for back pain, neck pain and neck stiffness  Neurological: Positive for numbness  Negative for dizziness, light-headedness and headaches  Hematological: Does not bruise/bleed easily  Psychiatric/Behavioral: Negative for behavioral problems and confusion  Active Problem List     Patient Active Problem List   Diagnosis    Allergic rhinitis    Asplenia    Moderate persistent asthma without complication    Acquired hypothyroidism    Leiomyoma of uterus    History of ITP    Former smoker    Family history of breast cancer    Dense breast tissue    Cyst of skin of right breast    Hypercholesterolemia    H/O splenectomy ( ITP)       Past Medical History:  Reviewed    Past Surgical History:  Reviewed    Family History:  Reviewed    Social History:  Reviewed    Objective     Vitals:    03/24/20 0926   BP: 150/90   Pulse: 66   Temp: 98 4 °F (36 9 °C)   SpO2: 97%   Weight: 61 7 kg (136 lb)   Height: 5' (1 524 m)     Body mass index is 26 56 kg/m²      BP Readings from Last 3 Encounters:   03/24/20 150/90   03/23/20 160/69   10/15/19 167/93       Wt Readings from Last 3 Encounters:   03/24/20 61 7 kg (136 lb)   10/15/19 61 3 kg (135 lb 2 3 oz)   10/11/19 62 kg (136 lb 9 6 oz)       Physical Exam   Constitutional: She is oriented to person, place, and time  She appears well-developed and well-nourished  Non-toxic appearance  Seated on table, afebrile, uncomfortable with right shoulder / arm pain  HENT:   Mouth/Throat: Oropharynx is clear and moist  No oropharyngeal exudate  Mild redness pharynx, no exudate, neck is supple without swollen glands   Eyes: Conjunctivae are normal  No scleral icterus  Cardiovascular:   No carotid bruit   Pulmonary/Chest: Effort normal and breath sounds normal  No respiratory distress  Abdominal: Soft  There is no tenderness  Musculoskeletal: She exhibits no edema  Neck with good range of motion, nontender  Very tender deltoid bursa area on right, decreased range of motion due to same  Decreased sensation to touch radial forearm/hand  Fine motor intact,  strength intact  Good color, good warmth  Lymphadenopathy:     She has no cervical adenopathy  Neurological: She is alert and oriented to person, place, and time  Psychiatric: She has a normal mood and affect   Her behavior is normal        ALLERGIES:  Allergies   Allergen Reactions    Ibuprofen Anaphylaxis, Edema and Hives    Grass Extracts [Gramineae Pollens]     Molds & Smuts     Ragwitek [Short Ragweed Pollen Ext]     Aspirin Hives       Current Medications     Current Outpatient Medications   Medication Sig Dispense Refill    acetaminophen (TYLENOL) 500 mg tablet Take 2 tablets (1,000 mg total) by mouth every 6 (six) hours as needed (pain/injury) 30 tablet 0    acetaminophen (TYLENOL) 650 mg CR tablet Take 1 tablet (650 mg total) by mouth every 8 (eight) hours as needed for mild pain 30 tablet 0    albuterol (VENTOLIN HFA) 90 mcg/act inhaler Inhale 2 puffs every 4 (four) hours as needed for wheezing 1 Inhaler 0    ammonium lactate (LAC-HYDRIN) 12 % lotion       AYR SALINE NASAL NO-DRIP NA 1 spray into each nostril daily at bedtime      cetirizine (ZyrTEC) 10 mg tablet Take 10 mg by mouth daily        FLOVENT  MCG/ACT inhaler TAKE 2 PUFFS BY MOUTH TWICE A DAY 1 Inhaler 6    fluticasone (FLONASE) 50 mcg/act nasal spray 2 sprays into each nostril daily 16 g 11    levothyroxine 150 mcg tablet Use one pill daily 6 times a week ( sip once a week) 30 tablet 5    lidocaine (LIDODERM) 5 % Apply 1 patch topically daily Remove & Discard patch within 12 hours or as directed by MD 30 patch 0    predniSONE 10 mg tablet Use 40mg x 3 days, 30 mg x 3 days, 20 mg x 3 days, 10 mg x 3 days then stop 30 tablet 0    tiotropium (SPIRIVA RESPIMAT) 1 25 MCG/ACT AERS inhaler Inhale 2 puffs daily as needed       traMADol (ULTRAM) 50 mg tablet Take 1 tablet (50 mg total) by mouth every 6 (six) hours as needed for moderate pain 20 tablet 0     No current facility-administered medications for this visit               Orders Placed This Encounter   Procedures    POCT rapid strepA         Davide Lewis DO

## 2020-03-24 NOTE — PATIENT INSTRUCTIONS
Severe neuralgia right upper extremity, cervical radicular verses axillary verses bursitis  She will use prednisone course, can use tramadol every 6 hours as needed along with Tylenol/lidocaine  If notes increased weakness she should be re-evaluated at emergency room  Regarding sore throat strep screen is negative here today, observe  Note does have remote history splenectomy due to ITP  Watch for fever  She does have slip to redo TSH mid April, currently on 150 mcg 6 days weekly  No increased asthmatic complaints, stay on Flovent

## 2020-04-15 ENCOUNTER — APPOINTMENT (OUTPATIENT)
Dept: LAB | Facility: HOSPITAL | Age: 55
End: 2020-04-15
Payer: COMMERCIAL

## 2020-04-15 DIAGNOSIS — E03.9 ACQUIRED HYPOTHYROIDISM: ICD-10-CM

## 2020-04-15 LAB — TSH SERPL DL<=0.05 MIU/L-ACNC: 2.08 UIU/ML (ref 0.36–3.74)

## 2020-04-15 PROCEDURE — 84443 ASSAY THYROID STIM HORMONE: CPT

## 2020-04-15 PROCEDURE — 36415 COLL VENOUS BLD VENIPUNCTURE: CPT

## 2020-05-11 DIAGNOSIS — E03.9 ACQUIRED HYPOTHYROIDISM: ICD-10-CM

## 2020-05-11 RX ORDER — LEVOTHYROXINE SODIUM 0.15 MG/1
TABLET ORAL
Qty: 30 TABLET | Refills: 5 | Status: SHIPPED | OUTPATIENT
Start: 2020-05-11 | End: 2020-10-09

## 2020-06-11 ENCOUNTER — APPOINTMENT (OUTPATIENT)
Dept: RADIOLOGY | Age: 55
End: 2020-06-11
Payer: COMMERCIAL

## 2020-06-11 ENCOUNTER — OFFICE VISIT (OUTPATIENT)
Dept: URGENT CARE | Age: 55
End: 2020-06-11
Payer: COMMERCIAL

## 2020-06-11 VITALS
HEART RATE: 60 BPM | BODY MASS INDEX: 26.5 KG/M2 | TEMPERATURE: 96.9 F | HEIGHT: 60 IN | OXYGEN SATURATION: 97 % | SYSTOLIC BLOOD PRESSURE: 156 MMHG | WEIGHT: 135 LBS | RESPIRATION RATE: 16 BRPM | DIASTOLIC BLOOD PRESSURE: 89 MMHG

## 2020-06-11 DIAGNOSIS — S39.012A STRAIN OF LUMBAR REGION, INITIAL ENCOUNTER: Primary | ICD-10-CM

## 2020-06-11 DIAGNOSIS — S39.012A STRAIN OF LUMBAR REGION, INITIAL ENCOUNTER: ICD-10-CM

## 2020-06-11 PROCEDURE — 72100 X-RAY EXAM L-S SPINE 2/3 VWS: CPT

## 2020-06-11 PROCEDURE — 99203 OFFICE O/P NEW LOW 30 MIN: CPT | Performed by: PHYSICIAN ASSISTANT

## 2020-06-11 PROCEDURE — 99283 EMERGENCY DEPT VISIT LOW MDM: CPT | Performed by: PHYSICIAN ASSISTANT

## 2020-06-11 PROCEDURE — G0382 LEV 3 HOSP TYPE B ED VISIT: HCPCS | Performed by: PHYSICIAN ASSISTANT

## 2020-06-11 RX ORDER — PREDNISONE 10 MG/1
TABLET ORAL
Qty: 30 TABLET | Refills: 0 | Status: SHIPPED | OUTPATIENT
Start: 2020-06-11 | End: 2020-07-13 | Stop reason: ALTCHOICE

## 2020-06-11 RX ORDER — METHOCARBAMOL 500 MG/1
500 TABLET, FILM COATED ORAL 2 TIMES DAILY
Qty: 20 TABLET | Refills: 0 | Status: SHIPPED | OUTPATIENT
Start: 2020-06-11 | End: 2020-09-14 | Stop reason: ALTCHOICE

## 2020-06-12 ENCOUNTER — OFFICE VISIT (OUTPATIENT)
Dept: FAMILY MEDICINE CLINIC | Facility: CLINIC | Age: 55
End: 2020-06-12
Payer: COMMERCIAL

## 2020-06-12 VITALS
HEIGHT: 60 IN | OXYGEN SATURATION: 99 % | SYSTOLIC BLOOD PRESSURE: 160 MMHG | TEMPERATURE: 97.7 F | WEIGHT: 134 LBS | BODY MASS INDEX: 26.31 KG/M2 | HEART RATE: 76 BPM | DIASTOLIC BLOOD PRESSURE: 90 MMHG

## 2020-06-12 DIAGNOSIS — S60.021A CONTUSION OF RIGHT INDEX FINGER WITHOUT DAMAGE TO NAIL, INITIAL ENCOUNTER: ICD-10-CM

## 2020-06-12 DIAGNOSIS — M54.50 ACUTE BILATERAL LOW BACK PAIN WITHOUT SCIATICA: ICD-10-CM

## 2020-06-12 DIAGNOSIS — S39.012S STRAIN OF LUMBAR REGION, SEQUELA: Primary | ICD-10-CM

## 2020-06-12 PROCEDURE — 1036F TOBACCO NON-USER: CPT | Performed by: FAMILY MEDICINE

## 2020-06-12 PROCEDURE — 99214 OFFICE O/P EST MOD 30 MIN: CPT | Performed by: FAMILY MEDICINE

## 2020-06-12 PROCEDURE — 3008F BODY MASS INDEX DOCD: CPT | Performed by: FAMILY MEDICINE

## 2020-08-25 ENCOUNTER — TRANSCRIBE ORDERS (OUTPATIENT)
Dept: ADMINISTRATIVE | Facility: HOSPITAL | Age: 55
End: 2020-08-25

## 2020-08-25 DIAGNOSIS — Z12.31 ENCOUNTER FOR SCREENING MAMMOGRAM FOR MALIGNANT NEOPLASM OF BREAST: Primary | ICD-10-CM

## 2020-09-13 NOTE — PATIENT INSTRUCTIONS
Doing well     We did review previous blood work,   She should redo fasting BW in Oct   TSH 2 08 in April - stay w Levothyroxine 150 SIX/6 times a week  She is up to date with Lipid screening  Chol 259 w HDL 58/  1 yr ago -  Await redo  Consider future statin  She is up to date with Diabetes screening  Glucose 71 1 yr ago  She had visit at [facility] 6/2018 re right grt toe pain, told had gout- Xray was ok  Has occ pain -  No Fam hx gout, no etoh use, no seafood use  Include Uric acid w BW  Immunization History   Administered Date(s) Administered    Hib (PRP-OMP) 02/23/2011    Influenza Quadrivalent, 6-35 Months IM 11/12/2015, 10/10/2017    Influenza, recombinant, quadrivalent,injectable, preservative free 10/02/2018    Meningococcal, Unknown Serogroups 09/08/2015, 09/12/2017    Pneumococcal Polysaccharide PPV23 02/23/2011    Td (adult), adsorbed 09/12/2017    Tetanus, adsorbed 12/28/1998    Zoster 06/01/2017, 10/10/2017     Hx ITP w/ Splenectomy -  ( Plt count 288k 1 yr ago)  She does do yearly Flu shot  Tdap/tetanus shot is up to date  (done every 10 yrs for superficial cuts, every 5 yrs for deep wounds)  Rcvd HIB/Meningitis/Pneumo in past   Rcvd Zoster  Is a former smoker, quit 6 years ago  CT chest was ok 2017  Has asthma, uses rescue inhaler as needed  On Flovent/ unsure if on Spiriva  Has seen Allergist      Regarding Colon Cancer screening, had remote colonoscopy-   Had issues w prep, she will call [facility]  re redo  Relates insurance will not cover cologuard  She does see her Gynecologist routinely  Pap was ok 12/18  Discussed screening Mammogram, this is up-to-date  Was ok 9/19 -  Has redo scheeduled  Hepatitis C Screening indicated for 'baby boomers' -  after discussion she will not do Hepatitis C screen  previously perfomed and was negative in 2018  Continue to try to watch healthy diet, exercise routinely      We will see her again in 6 months, sooner as needed

## 2020-09-14 ENCOUNTER — OFFICE VISIT (OUTPATIENT)
Dept: FAMILY MEDICINE CLINIC | Facility: CLINIC | Age: 55
End: 2020-09-14
Payer: COMMERCIAL

## 2020-09-14 VITALS
HEART RATE: 67 BPM | BODY MASS INDEX: 25.72 KG/M2 | WEIGHT: 131 LBS | DIASTOLIC BLOOD PRESSURE: 70 MMHG | HEIGHT: 60 IN | SYSTOLIC BLOOD PRESSURE: 132 MMHG | OXYGEN SATURATION: 97 % | TEMPERATURE: 97.8 F

## 2020-09-14 DIAGNOSIS — E78.00 HYPERCHOLESTEROLEMIA: ICD-10-CM

## 2020-09-14 DIAGNOSIS — M79.674 PAIN OF RIGHT GREAT TOE: ICD-10-CM

## 2020-09-14 DIAGNOSIS — Z87.891 FORMER SMOKER: ICD-10-CM

## 2020-09-14 DIAGNOSIS — E03.9 ACQUIRED HYPOTHYROIDISM: ICD-10-CM

## 2020-09-14 DIAGNOSIS — Z90.81 H/O SPLENECTOMY: ICD-10-CM

## 2020-09-14 DIAGNOSIS — Z00.00 ENCOUNTER FOR ANNUAL PHYSICAL EXAM: Primary | ICD-10-CM

## 2020-09-14 DIAGNOSIS — Z86.2 HISTORY OF ITP: ICD-10-CM

## 2020-09-14 DIAGNOSIS — Z80.3 FAMILY HISTORY OF BREAST CANCER: ICD-10-CM

## 2020-09-14 DIAGNOSIS — J45.40 MODERATE PERSISTENT ASTHMA WITHOUT COMPLICATION: ICD-10-CM

## 2020-09-14 PROCEDURE — 99396 PREV VISIT EST AGE 40-64: CPT | Performed by: FAMILY MEDICINE

## 2020-09-14 PROCEDURE — 1036F TOBACCO NON-USER: CPT | Performed by: FAMILY MEDICINE

## 2020-09-14 NOTE — PROGRESS NOTES
BMI Counseling: Body mass index is 25 58 kg/m²  The BMI is above normal  Nutrition recommendations include decreasing portion sizes, encouraging healthy choices of fruits and vegetables and moderation in carbohydrate intake  Exercise recommendations include exercising 3-5 times per week  Depression Screening and Follow-up Plan: Clincally patient does not have depression  No treatment is required  FAMILY PRACTICE OFFICE VISIT  [facility]  Critical [facility] hospital [facility] [address]  [place], [place], [address]      NAME: Sheeba Trinh  AGE: 60 y o  SEX: female  : 1965   MRN: [id number]    DATE: 2020  TIME: 12:03 PM    Assessment and Plan     Problem List Items Addressed This Visit        Endocrine    Acquired hypothyroidism    Relevant Orders    TSH, 3rd generation       Respiratory    Moderate persistent asthma without complication    Relevant Orders    CBC       Other    History of ITP    Relevant Orders    CBC    Former smoker    Family history of breast cancer    Hypercholesterolemia    Relevant Orders    Comprehensive metabolic panel    Lipid panel    H/O splenectomy ( ITP)    Relevant Orders    CBC      Other Visit Diagnoses     Encounter for annual physical exam    -  Primary    Pain of right great toe        Relevant Orders    Uric acid          Patient Instructions     Doing well     We did review previous blood work,   She should redo fasting BW in Oct   TSH 2 08 in April - stay w Levothyroxine 150 SIX/6 times a week  She is up to date with Lipid screening  Chol 259 w HDL 58/  1 yr ago -  Await redo  Consider future statin  She is up to date with Diabetes screening  Glucose 71 1 yr ago  She had visit at [facility] 2018 re right grt toe pain, told had gout- Xray was ok  Has occ pain -  No Fam hx gout, no etoh use, no seafood use  Include Uric acid w BW       Immunization History   Administered Date(s) Administered  Hib (PRP-OMP) 02/23/2011    Influenza Quadrivalent, 6-35 Months IM 11/12/2015, 10/10/2017    Influenza, recombinant, quadrivalent,injectable, preservative free 10/02/2018    Meningococcal, Unknown Serogroups 09/08/2015, 09/12/2017    Pneumococcal Polysaccharide PPV23 02/23/2011    Td (adult), adsorbed 09/12/2017    Tetanus, adsorbed 12/28/1998    Zoster 06/01/2017, 10/10/2017     Hx ITP w/ Splenectomy -  ( Plt count 288k 1 yr ago)  She does do yearly Flu shot  Tdap/tetanus shot is up to date  (done every 10 yrs for superficial cuts, every 5 yrs for deep wounds)  Rcvd HIB/Meningitis/Pneumo in past   Rcvd Zoster  Is a former smoker, quit 6 years ago  CT chest was ok 2017  Has asthma, uses rescue inhaler as needed  On Flovent/ unsure if on Spiriva  Has seen Allergist      Regarding Colon Cancer screening, had remote colonoscopy-   Had issues w prep, she will call [facility]  re redo  Relates insurance will not cover cologuard  She does see her Gynecologist routinely  Pap was ok 12/18  Discussed screening Mammogram, this is up-to-date  Was ok 9/19 -  Has redo scheeduled  Hepatitis C Screening indicated for 'baby boomers' -  after discussion she will not do Hepatitis C screen  previously perfomed and was negative in 2018  Continue to try to watch healthy diet, exercise routinely  We will see her again in 6 months, sooner as needed  Chief Complaint     Chief Complaint   Patient presents with    Physical Exam       History of Present Illness   [facility] is a 60y o -year-old female who is in today for routine physical   I had seen her back in June with low back pain, also did seen back in March with neuralgia right lower extremity, had used prednisone  -   Sx resolved  Feels well  Review of Systems   Review of Systems   Constitutional: Negative for appetite change, fatigue, fever and unexpected weight change     HENT: Negative for sore throat and trouble swallowing  Respiratory: Negative for cough, chest tightness and shortness of breath  Cardiovascular: Negative for chest pain, palpitations and leg swelling  Gastrointestinal: Negative for abdominal pain, blood in stool, nausea and vomiting  No acid reflux     No change in bowel   Genitourinary: Negative for dysuria and hematuria  Neurological: Negative for dizziness, syncope, light-headedness and headaches  Psychiatric/Behavioral: Negative for behavioral problems and confusion  Active Problem List     Patient Active Problem List   Diagnosis    Allergic rhinitis    Asplenia    Moderate persistent asthma without complication    Acquired hypothyroidism    Leiomyoma of uterus    History of ITP    Former smoker    Family history of breast cancer    Dense breast tissue    Cyst of skin of right breast    Hypercholesterolemia    H/O splenectomy ( ITP)       Past Medical History:  Reviewed    Past Surgical History:  Reviewed    Family History:  Reviewed    Social History:  Reviewed    Objective     Vitals:    09/14/20 1027   BP: 132/70   BP Location: Left arm   Patient Position: Sitting   Cuff Size: Standard   Pulse: 67   Temp: 97 8 °F (36 6 °C)   SpO2: 97%   Weight: 59 4 kg (131 lb)   Height: 5' (1 524 m)     Body mass index is 25 58 kg/m²  BP Readings from Last 3 Encounters:   09/14/20 132/70   06/12/20 160/90   06/11/20 156/89       Wt Readings from Last 3 Encounters:   09/14/20 59 4 kg (131 lb)   06/12/20 60 8 kg (134 lb)   06/11/20 61 2 kg (135 lb)       Physical Exam  Constitutional:       General: She is not in acute distress  Appearance: Normal appearance  She is well-developed  Eyes:      General: No scleral icterus  Neck:      Vascular: No carotid bruit  Cardiovascular:      Rate and Rhythm: Normal rate and regular rhythm  Heart sounds: Normal heart sounds  No murmur  Pulmonary:      Effort: Pulmonary effort is normal  No respiratory distress  Breath sounds: Normal breath sounds  Abdominal:      Palpations: Abdomen is soft  Tenderness: There is no abdominal tenderness  Musculoskeletal:      Right lower leg: No edema  Left lower leg: No edema  Lymphadenopathy:      Cervical: No cervical adenopathy  Neurological:      Mental Status: She is alert and oriented to person, place, and time  Psychiatric:         Mood and Affect: Mood normal          Behavior: Behavior normal          ALLERGIES:  Allergies   Allergen Reactions    Ibuprofen Anaphylaxis, Edema and Hives    Grass Extracts [Gramineae Pollens]     Molds & Smuts     Ragwitek [Short Ragweed Pollen Ext]     Aspirin Hives       Current Medications     Current Outpatient Medications   Medication Sig Dispense Refill    cetirizine (ZyrTEC) 10 mg tablet Take 10 mg by mouth daily        FLOVENT  MCG/ACT inhaler TAKE 2 PUFFS BY MOUTH TWICE A DAY 1 Inhaler 6    fluticasone (FLONASE) 50 mcg/act nasal spray 2 sprays into each nostril daily 16 g 11    levothyroxine 150 mcg tablet Use 1 tablet 6 days weekly, skip once a week, or as directed 30 tablet 5    tiotropium (SPIRIVA RESPIMAT) 1 25 MCG/ACT AERS inhaler Inhale 2 puffs daily as needed       acetaminophen (TYLENOL) 650 mg CR tablet Take 1 tablet (650 mg total) by mouth every 8 (eight) hours as needed for mild pain 30 tablet 0    albuterol (VENTOLIN HFA) 90 mcg/act inhaler Inhale 2 puffs every 4 (four) hours as needed for wheezing 1 Inhaler 0    ammonium lactate (LAC-HYDRIN) 12 % lotion       AYR SALINE NASAL NO-DRIP NA 1 spray into each nostril daily at bedtime       No current facility-administered medications for this visit               Orders Placed This Encounter   Procedures    TSH, 3rd generation    CBC    Comprehensive metabolic panel    Lipid panel    Uric acid         Vida Wilson DO

## 2020-10-09 ENCOUNTER — LAB (OUTPATIENT)
Dept: LAB | Facility: HOSPITAL | Age: 55
End: 2020-10-09
Payer: COMMERCIAL

## 2020-10-09 DIAGNOSIS — E03.9 ACQUIRED HYPOTHYROIDISM: ICD-10-CM

## 2020-10-09 DIAGNOSIS — Z86.2 HISTORY OF ITP: ICD-10-CM

## 2020-10-09 DIAGNOSIS — E78.00 HYPERCHOLESTEROLEMIA: ICD-10-CM

## 2020-10-09 DIAGNOSIS — Z90.81 H/O SPLENECTOMY: ICD-10-CM

## 2020-10-09 DIAGNOSIS — J45.40 MODERATE PERSISTENT ASTHMA WITHOUT COMPLICATION: ICD-10-CM

## 2020-10-09 LAB
ALBUMIN SERPL BCP-MCNC: 3.8 G/DL (ref 3.5–5)
ALP SERPL-CCNC: 70 U/L (ref 46–116)
ALT SERPL W P-5'-P-CCNC: 26 U/L (ref 12–78)
ANION GAP SERPL CALCULATED.3IONS-SCNC: 6 MMOL/L (ref 4–13)
AST SERPL W P-5'-P-CCNC: 12 U/L (ref 5–45)
BILIRUB SERPL-MCNC: 0.6 MG/DL (ref 0.2–1)
BUN SERPL-MCNC: 16 MG/DL (ref 5–25)
CALCIUM SERPL-MCNC: 9.3 MG/DL (ref 8.3–10.1)
CHLORIDE SERPL-SCNC: 105 MMOL/L (ref 100–108)
CHOLEST SERPL-MCNC: 207 MG/DL (ref 50–200)
CO2 SERPL-SCNC: 27 MMOL/L (ref 21–32)
CREAT SERPL-MCNC: 0.82 MG/DL (ref 0.6–1.3)
ERYTHROCYTE [DISTWIDTH] IN BLOOD BY AUTOMATED COUNT: 14.2 % (ref 11.6–15.1)
GFR SERPL CREATININE-BSD FRML MDRD: 80 ML/MIN/1.73SQ M
GLUCOSE P FAST SERPL-MCNC: 121 MG/DL (ref 65–99)
HCT VFR BLD AUTO: 48.2 % (ref 34.8–46.1)
HDLC SERPL-MCNC: 50 MG/DL
HGB BLD-MCNC: 15.9 G/DL (ref 11.5–15.4)
LDLC SERPL CALC-MCNC: 143 MG/DL (ref 0–100)
MCH RBC QN AUTO: 30.5 PG (ref 26.8–34.3)
MCHC RBC AUTO-ENTMCNC: 33 G/DL (ref 31.4–37.4)
MCV RBC AUTO: 92 FL (ref 82–98)
NONHDLC SERPL-MCNC: 157 MG/DL
PLATELET # BLD AUTO: 317 THOUSANDS/UL (ref 149–390)
PMV BLD AUTO: 11.9 FL (ref 8.9–12.7)
POTASSIUM SERPL-SCNC: 4.1 MMOL/L (ref 3.5–5.3)
PROT SERPL-MCNC: 7.3 G/DL (ref 6.4–8.2)
RBC # BLD AUTO: 5.22 MILLION/UL (ref 3.81–5.12)
SODIUM SERPL-SCNC: 138 MMOL/L (ref 136–145)
TRIGL SERPL-MCNC: 70 MG/DL
TSH SERPL DL<=0.05 MIU/L-ACNC: 0.17 UIU/ML (ref 0.36–3.74)
URATE SERPL-MCNC: 4.8 MG/DL (ref 2–6.8)
WBC # BLD AUTO: 7.66 THOUSAND/UL (ref 4.31–10.16)

## 2020-10-09 PROCEDURE — 80061 LIPID PANEL: CPT

## 2020-10-09 PROCEDURE — 84443 ASSAY THYROID STIM HORMONE: CPT

## 2020-10-09 PROCEDURE — 80053 COMPREHEN METABOLIC PANEL: CPT

## 2020-10-09 PROCEDURE — 36415 COLL VENOUS BLD VENIPUNCTURE: CPT

## 2020-10-09 PROCEDURE — 85027 COMPLETE CBC AUTOMATED: CPT

## 2020-10-09 PROCEDURE — 84550 ASSAY OF BLOOD/URIC ACID: CPT | Performed by: FAMILY MEDICINE

## 2020-10-09 RX ORDER — LEVOTHYROXINE SODIUM 0.15 MG/1
TABLET ORAL
Qty: 30 TABLET | Refills: 5
Start: 2020-10-09 | End: 2020-11-02

## 2020-11-02 ENCOUNTER — LAB (OUTPATIENT)
Dept: LAB | Facility: HOSPITAL | Age: 55
End: 2020-11-02
Payer: COMMERCIAL

## 2020-11-02 DIAGNOSIS — E03.9 ACQUIRED HYPOTHYROIDISM: ICD-10-CM

## 2020-11-02 LAB — TSH SERPL DL<=0.05 MIU/L-ACNC: 10.87 UIU/ML (ref 0.36–3.74)

## 2020-11-02 PROCEDURE — 84443 ASSAY THYROID STIM HORMONE: CPT

## 2020-11-02 PROCEDURE — 36415 COLL VENOUS BLD VENIPUNCTURE: CPT

## 2020-11-02 RX ORDER — LEVOTHYROXINE SODIUM 0.12 MG/1
125 TABLET ORAL DAILY
Qty: 30 TABLET | Refills: 6 | Status: SHIPPED | OUTPATIENT
Start: 2020-11-02 | End: 2021-05-18

## 2020-11-11 ENCOUNTER — HOSPITAL ENCOUNTER (OUTPATIENT)
Dept: MAMMOGRAPHY | Facility: MEDICAL CENTER | Age: 55
Discharge: HOME | End: 2020-11-11
Payer: COMMERCIAL

## 2020-11-11 VITALS — HEIGHT: 60 IN | WEIGHT: 131 LBS | BODY MASS INDEX: 25.72 KG/M2

## 2020-11-11 DIAGNOSIS — Z12.31 ENCOUNTER FOR SCREENING MAMMOGRAM FOR MALIGNANT NEOPLASM OF BREAST: ICD-10-CM

## 2020-11-11 PROCEDURE — 77063 BREAST TOMOSYNTHESIS BI: CPT

## 2020-11-11 PROCEDURE — 77067 SCR MAMMO BI INCL CAD: CPT

## 2020-12-31 ENCOUNTER — LAB (OUTPATIENT)
Dept: LAB | Facility: HOSPITAL | Age: 55
End: 2020-12-31
Payer: COMMERCIAL

## 2020-12-31 DIAGNOSIS — E03.9 ACQUIRED HYPOTHYROIDISM: ICD-10-CM

## 2020-12-31 LAB — TSH SERPL DL<=0.05 MIU/L-ACNC: 0.78 UIU/ML (ref 0.36–3.74)

## 2020-12-31 PROCEDURE — 36415 COLL VENOUS BLD VENIPUNCTURE: CPT

## 2020-12-31 PROCEDURE — 84443 ASSAY THYROID STIM HORMONE: CPT

## 2021-03-25 ENCOUNTER — OFFICE VISIT (OUTPATIENT)
Dept: FAMILY MEDICINE CLINIC | Facility: CLINIC | Age: 56
End: 2021-03-25
Payer: COMMERCIAL

## 2021-03-25 VITALS
HEART RATE: 83 BPM | WEIGHT: 122 LBS | OXYGEN SATURATION: 97 % | TEMPERATURE: 97.2 F | HEIGHT: 60 IN | BODY MASS INDEX: 23.95 KG/M2 | SYSTOLIC BLOOD PRESSURE: 122 MMHG | DIASTOLIC BLOOD PRESSURE: 68 MMHG

## 2021-03-25 DIAGNOSIS — Z12.11 COLON CANCER SCREENING: ICD-10-CM

## 2021-03-25 DIAGNOSIS — F43.21 GRIEF REACTION: ICD-10-CM

## 2021-03-25 DIAGNOSIS — E03.9 ACQUIRED HYPOTHYROIDISM: Primary | ICD-10-CM

## 2021-03-25 DIAGNOSIS — G47.9 SLEEP DISORDER: ICD-10-CM

## 2021-03-25 DIAGNOSIS — R73.9 BORDERLINE HYPERGLYCEMIA: ICD-10-CM

## 2021-03-25 PROCEDURE — 99214 OFFICE O/P EST MOD 30 MIN: CPT | Performed by: FAMILY MEDICINE

## 2021-03-25 PROCEDURE — 3008F BODY MASS INDEX DOCD: CPT | Performed by: FAMILY MEDICINE

## 2021-03-25 PROCEDURE — 3725F SCREEN DEPRESSION PERFORMED: CPT | Performed by: FAMILY MEDICINE

## 2021-03-25 PROCEDURE — 1036F TOBACCO NON-USER: CPT | Performed by: FAMILY MEDICINE

## 2021-03-25 RX ORDER — ZOLPIDEM TARTRATE 10 MG/1
10 TABLET ORAL
Qty: 15 TABLET | Refills: 0 | Status: SHIPPED | OUTPATIENT
Start: 2021-03-25 | End: 2021-05-17 | Stop reason: ALTCHOICE

## 2021-03-25 RX ORDER — FLUTICASONE PROPIONATE AND SALMETEROL XINAFOATE 230; 21 UG/1; UG/1
2 AEROSOL, METERED RESPIRATORY (INHALATION) 2 TIMES DAILY
COMMUNITY
Start: 2021-01-31

## 2021-03-25 NOTE — PROGRESS NOTES
BMI Counseling: Body mass index is 23 83 kg/m²  The BMI is above normal  Nutrition recommendations include encouraging healthy choices of fruits and vegetables  Depression Screening and Follow-up Plan: Patient's depression screening was positive with a PHQ-2 score of 6  Their PHQ-9 score was 23  Clincally patient does not have depression  No treatment is required  FAMILY PRACTICE OFFICE VISIT  [facility]  [address] [place] [address], [address]      NAME: Flaco Saucedo  AGE: 60 y o  SEX: female  : 1965   MRN: [id number]    DATE: 3/25/2021  TIME: 12:52 PM    Assessment and Plan     Problem List Items Addressed This Visit        Endocrine    Acquired hypothyroidism - Primary    Relevant Orders    TSH, 3rd generation      Other Visit Diagnoses     Grief reaction        Sleep disorder        Relevant Medications    zolpidem (AMBIEN) 10 mg tablet    Borderline hyperglycemia        Relevant Orders    Hemoglobin A1C    Colon cancer screening        Relevant Orders    Ambulatory referral for colonoscopy          Patient Instructions   I would like her to redo TSH, is now on  5 mcg daily levothyroxine  She has dropped about 9 lb, watch weight loss  ( BMI now 23 )  She attributes this to grief related to mom's passing  Ongoing issues with sleep, I did give prescription for Ambien 10 mg to use immediately before bed for short period, PDMP site was checked, discussed controlled substance  Discussed potential side fx  Call us if further weight loss  Last blood testing in October did show glucose 121, also include A1c with blood work  History ITP, most recent hemoglobin 15 9 with platelet count 110 K    Her mammogram was fine in November  She does see allergist   She has been off smoking for about 7 years  Last cholesterol reading October dropped to 207 with HDL 50, , previously 259   Recheck lipids late   She had seen GI back in 2018, never did colonoscopy, I did place order to do that  Colon cancer screen    Full physical 8 months, call sooner if needed   -  redo pneumococcal vaccine at follow-up    - we also gave her a number to call regarding COVID vaccine  Chief Complaint     Chief Complaint   Patient presents with    Follow-up    Depression     since her mom pas       History of Present Illness   Rashaun Barraza is a 60y o -year-old female who is in today for a regular follow-up, she is mourning the death of her mother, she  suddenly recently, acute MI  She is having difficulty with sleep, tearful, very close family  She is using thyroid medication as directed  Review of Systems   Review of Systems   Constitutional: Negative for appetite change, fatigue, fever and unexpected weight change  HENT: Negative for sore throat and trouble swallowing  Respiratory: Negative for cough, chest tightness and shortness of breath  At baseline   Cardiovascular: Negative for chest pain, palpitations and leg swelling  Gastrointestinal: Negative for abdominal pain, blood in stool, nausea and vomiting  No acid reflux     No change in bowel   Genitourinary: Negative for dysuria and hematuria  Neurological: Negative for dizziness, syncope, light-headedness and headaches  Psychiatric/Behavioral: Positive for sleep disturbance  Negative for behavioral problems and confusion  The patient is nervous/anxious          Active Problem List     Patient Active Problem List   Diagnosis    Allergic rhinitis    Asplenia    Moderate persistent asthma without complication    Acquired hypothyroidism    Leiomyoma of uterus    History of ITP    Former smoker    Family history of breast cancer    Dense breast tissue    Cyst of skin of right breast    Hypercholesterolemia    H/O splenectomy ( ITP)       Past Medical History:  Reviewed    Past Surgical History:  Reviewed    Family History:  Reviewed    Social History:  Reviewed    Objective     Vitals:    03/25/21 0937   BP: 122/68   BP Location: Right arm   Patient Position: Sitting   Cuff Size: Standard   Pulse: 83   Temp: (!) 97 2 °F (36 2 °C)   SpO2: 97%   Weight: 55 3 kg (122 lb)   Height: 5' (1 524 m)     Body mass index is 23 83 kg/m²  BP Readings from Last 3 Encounters:   03/25/21 122/68   09/14/20 132/70   06/12/20 160/90       Wt Readings from Last 3 Encounters:   03/25/21 55 3 kg (122 lb)   11/11/20 59 4 kg (131 lb)   09/14/20 59 4 kg (131 lb)       Physical Exam  Constitutional:       General: She is not in acute distress  Appearance: Normal appearance  She is well-developed  She is not ill-appearing  Eyes:      General: No scleral icterus  Cardiovascular:      Rate and Rhythm: Normal rate and regular rhythm  Heart sounds: Normal heart sounds  No murmur  Pulmonary:      Effort: Pulmonary effort is normal  No respiratory distress  Breath sounds: Normal breath sounds  Abdominal:      Palpations: Abdomen is soft  Tenderness: There is no abdominal tenderness  Musculoskeletal:      Right lower leg: No edema  Left lower leg: No edema  Lymphadenopathy:      Cervical: No cervical adenopathy  Skin:     Coloration: Skin is not jaundiced  Neurological:      Mental Status: She is alert and oriented to person, place, and time     Psychiatric:         Mood and Affect: Mood normal          Behavior: Behavior normal          ALLERGIES:  Allergies   Allergen Reactions    Ibuprofen Anaphylaxis, Edema and Hives    Grass Extracts [Gramineae Pollens]     Molds & Smuts     Ragwitek [Short Ragweed Pollen Ext]     Aspirin Hives       Current Medications     Current Outpatient Medications   Medication Sig Dispense Refill    Advair -21 MCG/ACT inhaler Inhale 2 puffs 2 (two) times a day      AYR SALINE NASAL NO-DRIP NA 1 spray into each nostril daily at bedtime      cetirizine (ZyrTEC) 10 mg tablet Take 10 mg by mouth daily        fluticasone (FLONASE) 50 mcg/act nasal spray 2 sprays into each nostril daily 16 g 11    levothyroxine 125 mcg tablet Take 1 tablet (125 mcg total) by mouth daily 30 tablet 6    acetaminophen (TYLENOL) 650 mg CR tablet Take 1 tablet (650 mg total) by mouth every 8 (eight) hours as needed for mild pain 30 tablet 0    albuterol (VENTOLIN HFA) 90 mcg/act inhaler Inhale 2 puffs every 4 (four) hours as needed for wheezing 1 Inhaler 0    ammonium lactate (LAC-HYDRIN) 12 % lotion       tiotropium (SPIRIVA RESPIMAT) 1 25 MCG/ACT AERS inhaler Inhale 2 puffs daily as needed       zolpidem (AMBIEN) 10 mg tablet Take 1 tablet (10 mg total) by mouth daily at bedtime as needed for sleep 15 tablet 0     No current facility-administered medications for this visit               Orders Placed This Encounter   Procedures    Hemoglobin A1C    TSH, 3rd generation    Ambulatory referral for colonoscopy         Davide Lewis DO

## 2021-03-25 NOTE — PATIENT INSTRUCTIONS
I would like her to redo TSH, is now on 01/20 5 mcg daily levothyroxine  She has dropped about 9 lb, watch weight loss  ( BMI now 23 )  She attributes this to grief related to mom's passing  Ongoing issues with sleep, I did give prescription for Ambien 10 mg to use immediately before bed for short period, PDMP site was checked, discussed controlled substance  Discussed potential side fx  Call us if further weight loss  Last blood testing in October did show glucose 121, also include A1c with blood work  History ITP, most recent hemoglobin 15 9 with platelet count 546 K    Her mammogram was fine in November  She does see allergist   She has been off smoking for about 7 years  Last cholesterol reading October dropped to 207 with HDL 50, , previously 259  Recheck lipids late 2021  She had seen GI back in 2018, never did colonoscopy, I did place order to do that  Colon cancer screen    Full physical 8 months, call sooner if needed   -  redo pneumococcal vaccine at follow-up    - we also gave her a number to call regarding COVID vaccine

## 2021-03-29 ENCOUNTER — LAB (OUTPATIENT)
Dept: LAB | Facility: HOSPITAL | Age: 56
End: 2021-03-29
Payer: COMMERCIAL

## 2021-03-29 LAB
EST. AVERAGE GLUCOSE BLD GHB EST-MCNC: 126 MG/DL
HBA1C MFR BLD: 6 %
TSH SERPL DL<=0.05 MIU/L-ACNC: 0.38 UIU/ML (ref 0.36–3.74)

## 2021-03-29 PROCEDURE — 36415 COLL VENOUS BLD VENIPUNCTURE: CPT | Performed by: FAMILY MEDICINE

## 2021-03-29 PROCEDURE — 83036 HEMOGLOBIN GLYCOSYLATED A1C: CPT | Performed by: FAMILY MEDICINE

## 2021-03-29 PROCEDURE — 84443 ASSAY THYROID STIM HORMONE: CPT | Performed by: FAMILY MEDICINE

## 2021-04-16 ENCOUNTER — IMMUNIZATIONS (OUTPATIENT)
Dept: FAMILY MEDICINE CLINIC | Facility: HOSPITAL | Age: 56
End: 2021-04-16

## 2021-04-16 DIAGNOSIS — Z23 ENCOUNTER FOR IMMUNIZATION: Primary | ICD-10-CM

## 2021-04-16 PROCEDURE — 91301 SARS-COV-2 / COVID-19 MRNA VACCINE (MODERNA) 100 MCG: CPT

## 2021-04-16 PROCEDURE — 0011A SARS-COV-2 / COVID-19 MRNA VACCINE (MODERNA) 100 MCG: CPT

## 2021-05-17 ENCOUNTER — IMMUNIZATIONS (OUTPATIENT)
Dept: FAMILY MEDICINE CLINIC | Facility: HOSPITAL | Age: 56
End: 2021-05-17

## 2021-05-17 ENCOUNTER — OFFICE VISIT (OUTPATIENT)
Dept: FAMILY MEDICINE CLINIC | Facility: CLINIC | Age: 56
End: 2021-05-17
Payer: COMMERCIAL

## 2021-05-17 ENCOUNTER — APPOINTMENT (OUTPATIENT)
Dept: LAB | Facility: MEDICAL CENTER | Age: 56
End: 2021-05-17
Payer: COMMERCIAL

## 2021-05-17 VITALS — WEIGHT: 108 LBS | BODY MASS INDEX: 21.09 KG/M2

## 2021-05-17 DIAGNOSIS — Z90.81 H/O SPLENECTOMY: ICD-10-CM

## 2021-05-17 DIAGNOSIS — E03.9 ACQUIRED HYPOTHYROIDISM: ICD-10-CM

## 2021-05-17 DIAGNOSIS — Z23 ENCOUNTER FOR IMMUNIZATION: Primary | ICD-10-CM

## 2021-05-17 DIAGNOSIS — R25.1 TREMOR: ICD-10-CM

## 2021-05-17 DIAGNOSIS — R63.4 WEIGHT LOSS, ABNORMAL: Primary | ICD-10-CM

## 2021-05-17 DIAGNOSIS — R63.4 WEIGHT LOSS, ABNORMAL: ICD-10-CM

## 2021-05-17 DIAGNOSIS — Q89.01 ASPLENIA: ICD-10-CM

## 2021-05-17 LAB
ALBUMIN SERPL BCP-MCNC: 3.9 G/DL (ref 3.5–5)
ALP SERPL-CCNC: 59 U/L (ref 46–116)
ALT SERPL W P-5'-P-CCNC: 24 U/L (ref 12–78)
ANION GAP SERPL CALCULATED.3IONS-SCNC: 5 MMOL/L (ref 4–13)
AST SERPL W P-5'-P-CCNC: 8 U/L (ref 5–45)
BACTERIA UR QL AUTO: ABNORMAL /HPF
BILIRUB SERPL-MCNC: 0.6 MG/DL (ref 0.2–1)
BILIRUB UR QL STRIP: ABNORMAL
BUN SERPL-MCNC: 9 MG/DL (ref 5–25)
CALCIUM SERPL-MCNC: 9.8 MG/DL (ref 8.3–10.1)
CHLORIDE SERPL-SCNC: 107 MMOL/L (ref 100–108)
CLARITY UR: ABNORMAL
CO2 SERPL-SCNC: 27 MMOL/L (ref 21–32)
COLOR UR: ABNORMAL
CREAT SERPL-MCNC: 0.8 MG/DL (ref 0.6–1.3)
ERYTHROCYTE [DISTWIDTH] IN BLOOD BY AUTOMATED COUNT: 14.6 % (ref 11.6–15.1)
GFR SERPL CREATININE-BSD FRML MDRD: 82 ML/MIN/1.73SQ M
GLUCOSE SERPL-MCNC: 156 MG/DL (ref 65–140)
GLUCOSE UR STRIP-MCNC: ABNORMAL MG/DL
HCT VFR BLD AUTO: 47 % (ref 34.8–46.1)
HGB BLD-MCNC: 16.1 G/DL (ref 11.5–15.4)
HGB UR QL STRIP.AUTO: NEGATIVE
HYALINE CASTS #/AREA URNS LPF: ABNORMAL /LPF
KETONES UR STRIP-MCNC: ABNORMAL MG/DL
LEUKOCYTE ESTERASE UR QL STRIP: NEGATIVE
MCH RBC QN AUTO: 30.7 PG (ref 26.8–34.3)
MCHC RBC AUTO-ENTMCNC: 34.3 G/DL (ref 31.4–37.4)
MCV RBC AUTO: 90 FL (ref 82–98)
NITRITE UR QL STRIP: NEGATIVE
NON-SQ EPI CELLS URNS QL MICRO: ABNORMAL /HPF
PH UR STRIP.AUTO: 5.5 [PH]
PLATELET # BLD AUTO: 243 THOUSANDS/UL (ref 149–390)
PMV BLD AUTO: 13 FL (ref 8.9–12.7)
POTASSIUM SERPL-SCNC: 3.4 MMOL/L (ref 3.5–5.3)
PROT SERPL-MCNC: 7.2 G/DL (ref 6.4–8.2)
PROT UR STRIP-MCNC: NEGATIVE MG/DL
RBC # BLD AUTO: 5.24 MILLION/UL (ref 3.81–5.12)
RBC #/AREA URNS AUTO: ABNORMAL /HPF
SODIUM SERPL-SCNC: 139 MMOL/L (ref 136–145)
SP GR UR STRIP.AUTO: 1.03 (ref 1–1.03)
TSH SERPL DL<=0.05 MIU/L-ACNC: 0.15 UIU/ML (ref 0.36–3.74)
UROBILINOGEN UR QL STRIP.AUTO: 0.2 E.U./DL
VIT B12 SERPL-MCNC: 253 PG/ML (ref 100–900)
WBC # BLD AUTO: 9.54 THOUSAND/UL (ref 4.31–10.16)
WBC #/AREA URNS AUTO: ABNORMAL /HPF

## 2021-05-17 PROCEDURE — 99214 OFFICE O/P EST MOD 30 MIN: CPT | Performed by: FAMILY MEDICINE

## 2021-05-17 PROCEDURE — 85027 COMPLETE CBC AUTOMATED: CPT | Performed by: FAMILY MEDICINE

## 2021-05-17 PROCEDURE — 0012A SARS-COV-2 / COVID-19 MRNA VACCINE (MODERNA) 100 MCG: CPT

## 2021-05-17 PROCEDURE — 84443 ASSAY THYROID STIM HORMONE: CPT | Performed by: FAMILY MEDICINE

## 2021-05-17 PROCEDURE — 82607 VITAMIN B-12: CPT

## 2021-05-17 PROCEDURE — 1036F TOBACCO NON-USER: CPT | Performed by: FAMILY MEDICINE

## 2021-05-17 PROCEDURE — 36415 COLL VENOUS BLD VENIPUNCTURE: CPT | Performed by: FAMILY MEDICINE

## 2021-05-17 PROCEDURE — 91301 SARS-COV-2 / COVID-19 MRNA VACCINE (MODERNA) 100 MCG: CPT

## 2021-05-17 PROCEDURE — 80053 COMPREHEN METABOLIC PANEL: CPT | Performed by: FAMILY MEDICINE

## 2021-05-17 PROCEDURE — 81001 URINALYSIS AUTO W/SCOPE: CPT | Performed by: FAMILY MEDICINE

## 2021-05-17 NOTE — PATIENT INSTRUCTIONS
Her weight here today is 108 lb, past few years she has run 120 to 130 lb  She is down 14 lb from when I saw her in March  She is down about 25 lb verses 1 year ago  Previously we felt 9 lb weight loss may be related to mom's passing  She relates her appetite is okay but she has been continuing to lose weight, also notes tremor  Her TSH readings have been very labile throughout the past few years, in March TSH was 0 377  She relates she is using her medication routinely, currently on 125 mcg levothyroxine daily  I would like her to redo blood work regarding weight loss, can consult with Endocrinology regarding labile TSH readings ( requests [facility] )  Continue to use medication routinely on empty stomach       See most recent visits/physical   She still needs to have evaluation with GI for colonoscopy

## 2021-05-17 NOTE — PROGRESS NOTES
FAMILY PRACTICE OFFICE VISIT  [facility]  [address]  [address] [place], [place], [address]      NAME: Gumaro Nolasco  AGE: 60 y o  SEX: female  : 1965   MRN: [id number]    DATE: 2021  TIME: 4:07 PM    Assessment and Plan     Problem List Items Addressed This Visit        Endocrine    Acquired hypothyroidism    Relevant Orders    TSH, 3rd generation    Ambulatory referral to Endocrinology       Other    Asplenia    Relevant Orders    CBC    Urinalysis with microscopic    H/O splenectomy ( ITP)      Other Visit Diagnoses     Weight loss, abnormal    -  Primary    Relevant Orders    CBC    Comprehensive metabolic panel    TSH, 3rd generation    Vitamin B12    Tremor        Relevant Orders    TSH, 3rd generation    Vitamin B12          Patient Instructions   Her weight here today is 108 lb, past few years she has run 120 to 130 lb  She is down 14 lb from when I saw her in March  She is down about 25 lb verses 1 year ago  Previously we felt 9 lb weight loss may be related to mom's passing  She relates her appetite is okay but she has been continuing to lose weight, also notes tremor  Her TSH readings have been very labile throughout the past few years, in March TSH was 0 377  She relates she is using her medication routinely, currently on 125 mcg levothyroxine daily  I would like her to redo blood work regarding weight loss, can consult with Endocrinology regarding labile TSH readings ( requests [facility] )  Continue to use medication routinely on empty stomach       See most recent visits/physical   She still needs to have evaluation with GI for colonoscopy      Chief Complaint     Chief Complaint   Patient presents with    Weight Loss       History of Present Illness   Gumaro Nolasco is a 60y o -year-old female who has lost 14 lb since I saw her in late March, she feels tremulous, she relates she is using her thyroid medication daily       Review of Systems   Review of Systems   Constitutional: Positive for fatigue and unexpected weight change  Negative for appetite change and fever  HENT: Negative for sore throat and trouble swallowing  Respiratory: Negative for cough, chest tightness and shortness of breath  Cardiovascular: Positive for palpitations  Negative for chest pain and leg swelling  Gastrointestinal: Negative for abdominal pain, blood in stool, nausea and vomiting  No acid reflux     No change in bowel   Genitourinary: Negative for dysuria and hematuria  Neurological: Positive for tremors  Negative for dizziness, syncope, light-headedness and headaches  Psychiatric/Behavioral: Negative for behavioral problems and confusion  Active Problem List     Patient Active Problem List   Diagnosis    Allergic rhinitis    Asplenia    Moderate persistent asthma without complication    Acquired hypothyroidism    Leiomyoma of uterus    History of ITP    Former smoker    Family history of breast cancer    Dense breast tissue    Cyst of skin of right breast    Hypercholesterolemia    H/O splenectomy ( ITP)       Past Medical History:  Reviewed    Past Surgical History:  Reviewed    Family History:  Reviewed    Social History:  Reviewed    Objective     Vitals:    05/17/21 1515   Weight: 49 kg (108 lb)     Body mass index is 21 09 kg/m²  BP Readings from Last 3 Encounters:   03/25/21 122/68   09/14/20 132/70   06/12/20 160/90       Wt Readings from Last 3 Encounters:   05/17/21 49 kg (108 lb)   03/25/21 55 3 kg (122 lb)   11/11/20 59 4 kg (131 lb)       Physical Exam  Constitutional:       General: She is not in acute distress  Appearance: She is well-developed  She is not ill-appearing  Comments: She is down 14 lb   Eyes:      General: No scleral icterus  Neck:      Vascular: No carotid bruit  Cardiovascular:      Rate and Rhythm: Normal rate and regular rhythm        Heart sounds: Normal heart sounds  No murmur  Comments: No carotid bruit  Pulmonary:      Effort: Pulmonary effort is normal  No respiratory distress  Breath sounds: Normal breath sounds  No wheezing or rales  Abdominal:      Palpations: Abdomen is soft  Tenderness: There is no abdominal tenderness  Musculoskeletal:      Right lower leg: No edema  Left lower leg: No edema  Lymphadenopathy:      Cervical: No cervical adenopathy  Skin:     Coloration: Skin is not jaundiced  Neurological:      Mental Status: She is alert and oriented to person, place, and time  Comments: Slight tremor out stretched hands   Psychiatric:         Behavior: Behavior normal          ALLERGIES:  Allergies   Allergen Reactions    Ibuprofen Anaphylaxis, Edema and Hives    Grass Extracts [Gramineae Pollens]     Molds & Smuts     Ragwitek [Short Ragweed Pollen Ext]     Aspirin Hives       Current Medications     Current Outpatient Medications   Medication Sig Dispense Refill    Advair -21 MCG/ACT inhaler Inhale 2 puffs 2 (two) times a day      ammonium lactate (LAC-HYDRIN) 12 % lotion       AYR SALINE NASAL NO-DRIP NA 1 spray into each nostril daily at bedtime      cetirizine (ZyrTEC) 10 mg tablet Take 10 mg by mouth daily        levothyroxine 125 mcg tablet Take 1 tablet (125 mcg total) by mouth daily 30 tablet 6    tiotropium (SPIRIVA RESPIMAT) 1 25 MCG/ACT AERS inhaler Inhale 2 puffs daily as needed       acetaminophen (TYLENOL) 650 mg CR tablet Take 1 tablet (650 mg total) by mouth every 8 (eight) hours as needed for mild pain 30 tablet 0    albuterol (VENTOLIN HFA) 90 mcg/act inhaler Inhale 2 puffs every 4 (four) hours as needed for wheezing 1 Inhaler 0    fluticasone (FLONASE) 50 mcg/act nasal spray 2 sprays into each nostril daily 16 g 11     No current facility-administered medications for this visit               Orders Placed This Encounter   Procedures    CBC    Comprehensive metabolic panel    TSH, 3rd generation    Urinalysis with microscopic    Vitamin B12    Ambulatory referral to Endocrinology         Ferdinand Ramesh DO

## 2021-05-18 DIAGNOSIS — E03.9 ACQUIRED HYPOTHYROIDISM: ICD-10-CM

## 2021-05-18 DIAGNOSIS — R73.9 BORDERLINE HYPERGLYCEMIA: ICD-10-CM

## 2021-05-18 DIAGNOSIS — E53.8 LOW VITAMIN B12 LEVEL: Primary | ICD-10-CM

## 2021-05-18 DIAGNOSIS — E53.8 LOW VITAMIN B12 LEVEL: ICD-10-CM

## 2021-05-18 RX ORDER — CYANOCOBALAMIN (VITAMIN B-12) 500 MCG
500 TABLET ORAL DAILY
Start: 2021-05-18

## 2021-05-18 RX ORDER — LEVOTHYROXINE SODIUM 0.12 MG/1
TABLET ORAL
Qty: 30 TABLET | Refills: 6
Start: 2021-05-18 | End: 2021-05-18 | Stop reason: SDUPTHER

## 2021-05-18 RX ORDER — LEVOTHYROXINE SODIUM 0.12 MG/1
TABLET ORAL
Qty: 30 TABLET | Refills: 6 | Status: SHIPPED | OUTPATIENT
Start: 2021-05-18 | End: 2021-07-15

## 2021-05-26 ENCOUNTER — TELEPHONE (OUTPATIENT)
Dept: FAMILY MEDICINE CLINIC | Facility: CLINIC | Age: 56
End: 2021-05-26

## 2021-05-26 NOTE — TELEPHONE ENCOUNTER
Patient called reporting her weight of 105 lb, and last OV weight was 108 lb  Patient would like to know if there is something she should do  Please advise  Thank you!

## 2021-05-26 NOTE — TELEPHONE ENCOUNTER
Please call her back and check if she has set up an evaluation w/ GI for a colonoscopy yet -   Stay on meds as is including lower dose of thyroid med -   If loses anymore weight - recheck visit w/ us

## 2021-05-27 NOTE — TELEPHONE ENCOUNTER
Patient informed of Dr Donald Tucker note below  Patient has not scheduled with GI yet, but states she will when she gets home  Patient verbalizes understanding of levothyroxine order, and the need to recheck with us if weight continues to decrease

## 2021-06-03 ENCOUNTER — HOSPITAL ENCOUNTER (EMERGENCY)
Facility: HOSPITAL | Age: 56
Discharge: HOME | End: 2021-06-03
Attending: EMERGENCY MEDICINE | Admitting: EMERGENCY MEDICINE
Payer: COMMERCIAL

## 2021-06-03 ENCOUNTER — APPOINTMENT (EMERGENCY)
Dept: CT IMAGING | Facility: HOSPITAL | Age: 56
End: 2021-06-03
Payer: COMMERCIAL

## 2021-06-03 VITALS
BODY MASS INDEX: 20.58 KG/M2 | OXYGEN SATURATION: 98 % | HEART RATE: 88 BPM | TEMPERATURE: 98.6 F | DIASTOLIC BLOOD PRESSURE: 80 MMHG | SYSTOLIC BLOOD PRESSURE: 165 MMHG | RESPIRATION RATE: 16 BRPM | WEIGHT: 105.38 LBS

## 2021-06-03 DIAGNOSIS — K92.1 BLOOD IN STOOL: ICD-10-CM

## 2021-06-03 DIAGNOSIS — R10.84 GENERALIZED ABDOMINAL PAIN: Primary | ICD-10-CM

## 2021-06-03 LAB
ALBUMIN SERPL BCP-MCNC: 3.8 G/DL (ref 3.5–5)
ALP SERPL-CCNC: 60 U/L (ref 46–116)
ALT SERPL W P-5'-P-CCNC: 20 U/L (ref 12–78)
ANION GAP SERPL CALCULATED.3IONS-SCNC: 7 MMOL/L (ref 4–13)
AST SERPL W P-5'-P-CCNC: 14 U/L (ref 5–45)
BASOPHILS # BLD AUTO: 0.06 THOUSANDS/ΜL (ref 0–0.1)
BASOPHILS NFR BLD AUTO: 1 % (ref 0–1)
BILIRUB SERPL-MCNC: 0.81 MG/DL (ref 0.2–1)
BILIRUB UR QL STRIP: NEGATIVE
BUN SERPL-MCNC: 11 MG/DL (ref 5–25)
CALCIUM SERPL-MCNC: 9.7 MG/DL (ref 8.3–10.1)
CHLORIDE SERPL-SCNC: 103 MMOL/L (ref 100–108)
CLARITY UR: CLEAR
CO2 SERPL-SCNC: 31 MMOL/L (ref 21–32)
COLOR UR: YELLOW
CREAT SERPL-MCNC: 0.82 MG/DL (ref 0.6–1.3)
EOSINOPHIL # BLD AUTO: 0.08 THOUSAND/ΜL (ref 0–0.61)
EOSINOPHIL NFR BLD AUTO: 1 % (ref 0–6)
ERYTHROCYTE [DISTWIDTH] IN BLOOD BY AUTOMATED COUNT: 15.4 % (ref 11.6–15.1)
GFR SERPL CREATININE-BSD FRML MDRD: 80 ML/MIN/1.73SQ M
GLUCOSE SERPL-MCNC: 149 MG/DL (ref 65–140)
GLUCOSE UR STRIP-MCNC: NEGATIVE MG/DL
HCT VFR BLD AUTO: 47.3 % (ref 34.8–46.1)
HGB BLD-MCNC: 16 G/DL (ref 11.5–15.4)
HGB UR QL STRIP.AUTO: NEGATIVE
IMM GRANULOCYTES # BLD AUTO: 0.05 THOUSAND/UL (ref 0–0.2)
IMM GRANULOCYTES NFR BLD AUTO: 1 % (ref 0–2)
KETONES UR STRIP-MCNC: NEGATIVE MG/DL
LEUKOCYTE ESTERASE UR QL STRIP: NEGATIVE
LIPASE SERPL-CCNC: 38 U/L (ref 73–393)
LYMPHOCYTES # BLD AUTO: 1.33 THOUSANDS/ΜL (ref 0.6–4.47)
LYMPHOCYTES NFR BLD AUTO: 17 % (ref 14–44)
MCH RBC QN AUTO: 31.3 PG (ref 26.8–34.3)
MCHC RBC AUTO-ENTMCNC: 33.8 G/DL (ref 31.4–37.4)
MCV RBC AUTO: 93 FL (ref 82–98)
MONOCYTES # BLD AUTO: 0.8 THOUSAND/ΜL (ref 0.17–1.22)
MONOCYTES NFR BLD AUTO: 10 % (ref 4–12)
NEUTROPHILS # BLD AUTO: 5.55 THOUSANDS/ΜL (ref 1.85–7.62)
NEUTS SEG NFR BLD AUTO: 70 % (ref 43–75)
NITRITE UR QL STRIP: NEGATIVE
NRBC BLD AUTO-RTO: 0 /100 WBCS
PH UR STRIP.AUTO: 7 [PH] (ref 4.5–8)
PLATELET # BLD AUTO: 275 THOUSANDS/UL (ref 149–390)
PMV BLD AUTO: 12.9 FL (ref 8.9–12.7)
POTASSIUM SERPL-SCNC: 3.7 MMOL/L (ref 3.5–5.3)
PROT SERPL-MCNC: 7.2 G/DL (ref 6.4–8.2)
PROT UR STRIP-MCNC: NEGATIVE MG/DL
RBC # BLD AUTO: 5.11 MILLION/UL (ref 3.81–5.12)
SODIUM SERPL-SCNC: 141 MMOL/L (ref 136–145)
SP GR UR STRIP.AUTO: 1.02 (ref 1–1.03)
UROBILINOGEN UR QL STRIP.AUTO: 0.2 E.U./DL
WBC # BLD AUTO: 7.87 THOUSAND/UL (ref 4.31–10.16)

## 2021-06-03 PROCEDURE — 99284 EMERGENCY DEPT VISIT MOD MDM: CPT

## 2021-06-03 PROCEDURE — 85025 COMPLETE CBC W/AUTO DIFF WBC: CPT | Performed by: PHYSICIAN ASSISTANT

## 2021-06-03 PROCEDURE — 83690 ASSAY OF LIPASE: CPT | Performed by: PHYSICIAN ASSISTANT

## 2021-06-03 PROCEDURE — 96361 HYDRATE IV INFUSION ADD-ON: CPT

## 2021-06-03 PROCEDURE — 81003 URINALYSIS AUTO W/O SCOPE: CPT

## 2021-06-03 PROCEDURE — 74177 CT ABD & PELVIS W/CONTRAST: CPT

## 2021-06-03 PROCEDURE — 36415 COLL VENOUS BLD VENIPUNCTURE: CPT | Performed by: PHYSICIAN ASSISTANT

## 2021-06-03 PROCEDURE — 96374 THER/PROPH/DIAG INJ IV PUSH: CPT

## 2021-06-03 PROCEDURE — 82272 OCCULT BLD FECES 1-3 TESTS: CPT

## 2021-06-03 PROCEDURE — 80053 COMPREHEN METABOLIC PANEL: CPT | Performed by: PHYSICIAN ASSISTANT

## 2021-06-03 PROCEDURE — 99284 EMERGENCY DEPT VISIT MOD MDM: CPT | Performed by: PHYSICIAN ASSISTANT

## 2021-06-03 RX ORDER — ONDANSETRON 2 MG/ML
4 INJECTION INTRAMUSCULAR; INTRAVENOUS ONCE
Status: COMPLETED | OUTPATIENT
Start: 2021-06-03 | End: 2021-06-03

## 2021-06-03 RX ADMIN — SODIUM CHLORIDE 1000 ML: 0.9 INJECTION, SOLUTION INTRAVENOUS at 11:02

## 2021-06-03 RX ADMIN — IOHEXOL 100 ML: 350 INJECTION, SOLUTION INTRAVENOUS at 12:02

## 2021-06-03 RX ADMIN — ONDANSETRON 4 MG: 2 INJECTION INTRAMUSCULAR; INTRAVENOUS at 11:02

## 2021-06-03 NOTE — ED PROVIDER NOTES
History  Chief Complaint   Patient presents with    Abdominal Pain     Worsening abdominal pain and blood in the stool  Sent by PCP for further evaluation  Patient is a 57-year-old female with a past medical history of hypothyroidism, hemorrhoids who presents with abdominal pain, blood in stool for 4 days  Patient describes a diffuse, lower abdominal aching to sharp pain over the last 3 days  She states the pain intermittently radiates to her lower back  She also noted a few episodes of bloody diarrhea, with blood with wiping as well as in the bowl  She is unable to quantify quantity of blood  She denies any associated dysuria, hematuria, urinary frequency or urgency, previous similar symptoms, recent travel, new foods or medications, recent antibiotics, previous similar symptoms  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 Taking?    AYR SALINE NASAL NO-DRIP NA  Self Yes Yes   Si spray into each nostril daily at bedtime   Advair -21 MCG/ACT inhaler   Yes Yes   Sig: Inhale 2 puffs 2 (two) times a day   albuterol (VENTOLIN HFA) 90 mcg/act inhaler  Self No Yes   Sig: Inhale 2 puffs every 4 (four) hours as needed for wheezing   cetirizine (ZyrTEC) 10 mg tablet  Self Yes Yes   Sig: Take 10 mg by mouth daily     fluticasone (FLONASE) 50 mcg/act nasal spray  Self No Yes   Si sprays into each nostril daily   levothyroxine 125 mcg tablet   No Yes   Sig: Use 1 pill 6 times weekly on empty stomach   tiotropium (Alan Dorsey) 1 25 MCG/ACT AERS inhaler  Self Yes Yes   Sig: Inhale 2 puffs daily as needed    vitamin B-12 (CYANOCOBALAMIN) 500 MCG TABS   No No   Sig: Take 1 tablet (500 mcg total) by mouth daily      Facility-Administered Medications: None       Past Medical History:   Diagnosis Date    Accidental fall     LAST ASSESSED: [id number]    Asthma     Cellulitis of left thigh     LAST ASSESSED: [id number]    Dermatitis     LAST ASSESSED: [id number]    Disease of thyroid gland     History of ITP     LAST ASSESSED: [id number]    Ingrown nail     RESOLVED: [id number]    Leg pain     Night sweats     Personal history of ovarian cyst     Pneumonia     LAST ASSESSED: [id number]    Right cervical radiculopathy     LAST ASSESSED: [id number]    Shortness of breath        Past Surgical History:   Procedure Laterality Date    COLONOSCOPY      SPLENECTOMY      TUBAL LIGATION         Family History   Problem Relation Age of Onset    Breast cancer Sister 50        MALIGNANT NEOPLASM    Diabetes Family     Breast cancer Maternal Aunt 61    Stomach cancer Maternal Uncle 61    Skin cancer Maternal Uncle         age [de-identified]   Siena Desai No Known Problems Daughter     No Known Problems Paternal Aunt     Hypertension Mother     Heart attack Mother      I have reviewed and agree with the history as documented  E-Cigarette/Vaping    E-Cigarette Use Never User      E-Cigarette/Vaping Substances     Social History     Tobacco Use    Smoking status: Former Smoker     Quit date: 2014     Years since quittin 4    Smokeless tobacco: Never Used    Tobacco comment: QUIT AROUND AGE 46   Substance Use Topics    Alcohol use: No    Drug use: No       Review of Systems   Constitutional: Negative for chills, diaphoresis and fever  HENT: Negative for congestion, ear pain, rhinorrhea and sore throat  Respiratory: Negative for cough, shortness of breath, wheezing and stridor      Cardiovascular: Negative for chest pain and palpitations  Gastrointestinal: Positive for abdominal pain, blood in stool, diarrhea and nausea  Negative for rectal pain and vomiting  Genitourinary: Negative for difficulty urinating, dysuria, frequency, hematuria and urgency  Musculoskeletal: Negative for myalgias, neck pain and neck stiffness  Skin: Negative for color change, pallor and rash  Neurological: Negative for dizziness, weakness, light-headedness, numbness and headaches  All other systems reviewed and are negative  Physical Exam  Physical Exam  Vitals signs and nursing note reviewed  Exam conducted with a chaperone present  Constitutional:       General: She is awake  She is not in acute distress  Appearance: She is well-developed  She is not ill-appearing, toxic-appearing or diaphoretic  HENT:      Head: Normocephalic and atraumatic  Right Ear: External ear normal       Left Ear: External ear normal       Nose: Nose normal    Eyes:      Conjunctiva/sclera: Conjunctivae normal       Pupils: Pupils are equal, round, and reactive to light  Neck:      Musculoskeletal: Normal range of motion and neck supple  Cardiovascular:      Rate and Rhythm: Normal rate and regular rhythm  Pulses: Normal pulses  Heart sounds: Normal heart sounds, S1 normal and S2 normal    Pulmonary:      Effort: Pulmonary effort is normal  No respiratory distress  Breath sounds: Normal breath sounds  No stridor  No decreased breath sounds or wheezing  Abdominal:      General: Bowel sounds are normal  There is no distension  Palpations: Abdomen is soft  Tenderness: There is abdominal tenderness in the suprapubic area and left lower quadrant  There is no right CVA tenderness, left CVA tenderness, guarding or rebound  Genitourinary:     Rectum: Guaiac result negative  No mass or tenderness  Musculoskeletal: Normal range of motion        Comments: Moving all extremities freely, ambulating without issue   Skin:     General: Skin is warm and dry  Capillary Refill: Capillary refill takes less than 2 seconds  Neurological:      Mental Status: She is alert and oriented to person, place, and time  Psychiatric:         Behavior: Behavior is cooperative           Vital Signs  ED Triage Vitals [06/03/21 0954]   Temperature Pulse Respirations Blood Pressure SpO2   98 6 °F (37 °C) 79 18 142/81 98 %      Temp Source Heart Rate Source Patient Position - Orthostatic VS BP Location FiO2 (%)   Oral Monitor Lying Right arm --      Pain Score       5           Vitals:    06/03/21 0954 06/03/21 1311   BP: 142/81 165/80   Pulse: 79 88   Patient Position - Orthostatic VS: Lying          Visual Acuity      ED Medications  Medications   sodium chloride 0 9 % bolus 1,000 mL (0 mL Intravenous Stopped 6/3/21 1202)   ondansetron (ZOFRAN) injection 4 mg (4 mg Intravenous Given 6/3/21 1102)   iohexol (OMNIPAQUE) 350 MG/ML injection (SINGLE-DOSE) 100 mL (100 mL Intravenous Given 6/3/21 1202)       Diagnostic Studies  Results Reviewed     Procedure Component Value Units Date/Time    Urine Macroscopic, POC [[id number]] Collected: 06/03/21 1130    Lab Status: Final result Specimen: Urine Updated: 06/03/21 1131     Color, UA Yellow     Clarity, UA Clear     pH, UA 7 0     Leukocytes, UA Negative     Nitrite, UA Negative     Protein, UA Negative mg/dl      Glucose, UA Negative mg/dl      Ketones, UA Negative mg/dl      Urobilinogen, UA 0 2 E U /dl      Bilirubin, UA Negative     Blood, UA Negative     Specific Gravity, UA 1 020    Narrative:      CLINITEK RESULT    Comprehensive metabolic panel [[id number]]  (Abnormal) Collected: 06/03/21 1059    Lab Status: Final result Specimen: Blood from Arm, Right Updated: 06/03/21 1118     Sodium 141 mmol/L      Potassium 3 7 mmol/L      Chloride 103 mmol/L      CO2 31 mmol/L      ANION GAP 7 mmol/L      BUN 11 mg/dL      Creatinine 0 82 mg/dL      Glucose 149 mg/dL      Calcium 9 7 mg/dL      AST 14 U/L      ALT 20 U/L Alkaline Phosphatase 60 U/L      Total Protein 7 2 g/dL      Albumin 3 8 g/dL      Total Bilirubin 0 81 mg/dL      eGFR 80 ml/min/1 73sq m     Narrative:      National Kidney Disease Foundation guidelines for Chronic Kidney Disease (CKD):     Stage 1 with normal or high GFR (GFR > 90 mL/min/1 73 square meters)    Stage 2 Mild CKD (GFR = 60-89 mL/min/1 73 square meters)    Stage 3A Moderate CKD (GFR = 45-59 mL/min/1 73 square meters)    Stage 3B Moderate CKD (GFR = 30-44 mL/min/1 73 square meters)    Stage 4 Severe CKD (GFR = 15-29 mL/min/1 73 square meters)    Stage 5 End Stage CKD (GFR <15 mL/min/1 73 square meters)  Note: GFR calculation is accurate only with a steady state creatinine    Lipase [[id number]]  (Abnormal) Collected: 06/03/21 1059    Lab Status: Final result Specimen: Blood from Arm, Right Updated: 06/03/21 1118     Lipase 38 u/L     CBC and differential [[id number]]  (Abnormal) Collected: 06/03/21 1059    Lab Status: Final result Specimen: Blood from Arm, Right Updated: 06/03/21 1104     WBC 7 87 Thousand/uL      RBC 5 11 Million/uL      Hemoglobin 16 0 g/dL      Hematocrit 47 3 %      MCV 93 fL      MCH 31 3 pg      MCHC 33 8 g/dL      RDW 15 4 %      MPV 12 9 fL      Platelets 990 Thousands/uL      nRBC 0 /100 WBCs      Neutrophils Relative 70 %      Immat GRANS % 1 %      Lymphocytes Relative 17 %      Monocytes Relative 10 %      Eosinophils Relative 1 %      Basophils Relative 1 %      Neutrophils Absolute 5 55 Thousands/µL      Immature Grans Absolute 0 05 Thousand/uL      Lymphocytes Absolute 1 33 Thousands/µL      Monocytes Absolute 0 80 Thousand/µL      Eosinophils Absolute 0 08 Thousand/µL      Basophils Absolute 0 06 Thousands/µL                  CT abdomen pelvis with contrast   Final Result by Hood Victoria MD (06/03 1223)      No acute intra-abdominal abnormality              Workstation performed: [id number]                    Procedures  Procedures         ED Course  ED Course as of Jun 03 1628   Thu Jun 03, 2021   1108 Consistent with baseline   Hemoglobin(!): 16 0   1249 IMPRESSION:     No acute intra-abdominal abnormality  CT abdomen pelvis with contrast                             SBIRT 20yo+      Most Recent Value   SBIRT (25 yo +)   In order to provide better care to our patients, we are screening all of our patients for alcohol and drug use  Would it be okay to ask you these screening questions? No Filed at: 06/03/2021 1318                    MDM  Number of Diagnoses or Management Options  Blood in stool:   Generalized abdominal pain:   Diagnosis management comments: Reviewed all results with patient, answered questions  Patient noted improvement of symptoms  Reviewed treatment at home  Recommended follow-up with GI for further evaluation of symptoms  Recommended follow-up with PCP as needed for monitoring of symptoms  The management plan was discussed in detail with the patient at bedside and all questions were answered  Provided both verbal and written instructions  Reviewed red flag symptoms and strict return to ED instructions  Patient notes understanding and agrees to plan  Disposition  Final diagnoses:   Generalized abdominal pain   Blood in stool     Time reflects when diagnosis was documented in both MDM as applicable and the Disposition within this note     Time User Action Codes Description Comment    6/3/2021  1:01 PM Jeanie Ramírez Add [R10 84] Generalized abdominal pain     6/3/2021  1:01 PM [facility] [address] [K92 1] Blood in stool       ED Disposition     ED Disposition Condition Date/Time Comment    Discharge Stable u Rudy 3, 2021  1:01 PM Misti Liliana discharge to home/self care              Follow-up Information     Follow up With Specialties Details Why [address] [facility] Emergency Department Emergency Medicine  If symptoms worsen [address] [facility]  [phone] MONICA VAZQUEZ  [facility] Emergency Department, [address] , [place], [address] [facility], [address] [facility] [place] [facility] Schedule an appointment as soon as possible for a visit   [address]  [address] [facility] [id number] [phone] [facility] [place], [address], [address], [place], [address] [facility], [address]    Maryellen Coleman DO Family Medicine  As needed [address]  [address] [facility] [address] [phone]             Discharge Medication List as of 6/3/2021  1:04 PM      CONTINUE these medications which have NOT CHANGED    Details   Advair -21 MCG/ACT inhaler Inhale 2 puffs 2 (two) times a day, Starting Sun 1/31/2021, Historical Med      albuterol (VENTOLIN HFA) 90 mcg/act inhaler Inhale 2 puffs every 4 (four) hours as needed for wheezing, Starting Tue 10/2/2018, Normal      AYR SALINE NASAL NO-DRIP NA 1 spray into each nostril daily at bedtime, Historical Med      cetirizine (ZyrTEC) 10 mg tablet Take 10 mg by mouth daily  , Starting Fri 2/27/2015, Historical Med      fluticasone (FLONASE) 50 mcg/act nasal spray 2 sprays into each nostril daily, Starting Tue 10/2/2018, Normal      levothyroxine 125 mcg tablet Use 1 pill 6 times weekly on empty stomach, Normal      tiotropium (SPIRIVA RESPIMAT) 1 25 MCG/ACT AERS inhaler Inhale 2 puffs daily as needed , Historical Med      vitamin B-12 (CYANOCOBALAMIN) 500 MCG TABS Take 1 tablet (500 mcg total) by mouth daily, Starting Tue 5/18/2021, No Print           No discharge procedures on file      PDMP Review       Value Time User    PDMP Reviewed  Yes 3/25/2021 10:11 AM Maryellen Coleman DO          ED Provider  Electronically Signed by           Pamela Shipley PA-C  06/03/21 3107

## 2021-06-03 NOTE — DISCHARGE INSTRUCTIONS
Continue Tylenol at home as needed for pain  Can try over-the-counter antidiarrheal medications as needed  Follow-up with GI for further evaluation of symptoms  Return to ED if symptoms worsen including increasing abdominal pain, inability tolerate food or fluid, blood in vomit, persistent rectal bleeding, dizziness, lightheadedness, fevers

## 2021-06-24 ENCOUNTER — PREP FOR PROCEDURE (OUTPATIENT)
Dept: GASTROENTEROLOGY | Facility: CLINIC | Age: 56
End: 2021-06-24

## 2021-06-24 ENCOUNTER — OFFICE VISIT (OUTPATIENT)
Dept: FAMILY MEDICINE CLINIC | Facility: CLINIC | Age: 56
End: 2021-06-24
Payer: COMMERCIAL

## 2021-06-24 VITALS
HEIGHT: 60 IN | OXYGEN SATURATION: 98 % | DIASTOLIC BLOOD PRESSURE: 90 MMHG | SYSTOLIC BLOOD PRESSURE: 120 MMHG | HEART RATE: 67 BPM | BODY MASS INDEX: 20.03 KG/M2 | TEMPERATURE: 98 F | WEIGHT: 102 LBS

## 2021-06-24 DIAGNOSIS — Z12.11 COLON CANCER SCREENING: ICD-10-CM

## 2021-06-24 DIAGNOSIS — R73.9 BORDERLINE HYPERGLYCEMIA: ICD-10-CM

## 2021-06-24 DIAGNOSIS — R10.84 GENERALIZED ABDOMINAL PAIN: Primary | ICD-10-CM

## 2021-06-24 DIAGNOSIS — R63.4 WEIGHT LOSS, UNINTENTIONAL: Primary | ICD-10-CM

## 2021-06-24 DIAGNOSIS — Z12.11 SCREENING FOR COLON CANCER: ICD-10-CM

## 2021-06-24 DIAGNOSIS — F43.21 GRIEF: ICD-10-CM

## 2021-06-24 DIAGNOSIS — R63.4 UNINTENTIONAL WEIGHT LOSS: ICD-10-CM

## 2021-06-24 DIAGNOSIS — E03.9 ACQUIRED HYPOTHYROIDISM: ICD-10-CM

## 2021-06-24 LAB — SL AMB POCT HEMOGLOBIN AIC: 6 (ref ?–6.5)

## 2021-06-24 PROCEDURE — 83036 HEMOGLOBIN GLYCOSYLATED A1C: CPT | Performed by: FAMILY MEDICINE

## 2021-06-24 PROCEDURE — 99214 OFFICE O/P EST MOD 30 MIN: CPT | Performed by: FAMILY MEDICINE

## 2021-06-24 PROCEDURE — 1036F TOBACCO NON-USER: CPT | Performed by: FAMILY MEDICINE

## 2021-06-24 RX ORDER — MULTIVITAMIN
1 TABLET ORAL DAILY
COMMUNITY

## 2021-06-24 RX ORDER — POLYETHYLENE GLYCOL 3350 17 G/17G
POWDER, FOR SOLUTION ORAL
Qty: 238 G | Refills: 0 | Status: SHIPPED | OUTPATIENT
Start: 2021-06-24 | End: 2021-08-30 | Stop reason: ALTCHOICE

## 2021-06-24 NOTE — PROGRESS NOTES
FAMILY PRACTICE OFFICE VISIT  [facility]  [address]  [address] [place], [place], [address]      NAME: Bebo Perez  AGE: 60 y o  SEX: female  : 1965   MRN: [id number]    DATE: 2021  TIME: 12:05 PM    Assessment and Plan     Problem List Items Addressed This Visit        Endocrine    Acquired hypothyroidism    Relevant Orders    TSH, 3rd generation      Other Visit Diagnoses     Weight loss, unintentional    -  Primary    Relevant Orders    Ambulatory referral to Gastroenterology    Borderline hyperglycemia        Relevant Orders    POCT hemoglobin A1c (Completed)    Grief        Colon cancer screening        Relevant Orders    Ambulatory referral to Gastroenterology          Patient Instructions   Reviewed previous visits, was last seen May 17th  Her weight at that time at 108 lb was decreased 14 lb verses March, she was down 25 lb verses 1 year previous  She is now down another 6 lb, 102 lb  BMI 19  I would like her to count her calories via phone samuel, do this over the course of 2 to 3 days, let us know what caloric intake she is getting  ADD Ensure one or two bottles daily for extra calories now  As in prior notes she had been advised to see Gastroenterology with colonoscopy, had seen GI in 2018, never did endoscopy  She does need Colonoscopy along with EGD regarding unintentional weight loss ( down 20 lbs vs March )  We reviewed emergency room visit with blood in stool, BM recently BID -   I again placed referral to GI, needs seen asap  ( she does relate issue w prep yrs ago )  She did have CT scanning of abdomen/pelvis , that was unremarkable  Her TSH in May was 0 152, we had decreased levothyroxine to 125 mcg 6 times a week, unfortunately she has not been using her medication that way  She has been using levothyroxine 125 mcg every other day    I would like her to use this 6 times weekly on empty stomach, plan TSH again in 5 weeks  Note she is not tachycardic here today - does not have clinical findings over replacement thyroid  I do not believe her thyroid is responsible for her weight loss  She will be seeing Endocrinology July 26th to discuss labile thyroid -   Labile readings past yrs  We reviewed other blood work, June 3rd urinalysis was unremarkable, CBC showed WBC 7 9, hemoglobin 16, platelet count 961 K  Remote history splenectomy  Glucose nonfasting 149, A1c was run here today for completeness, 6 0  ( we discussed she is not diabetic, does have increased diabetic risk long-term )  Her B12 level in May was 253, she should use over-the-counter B12 500 mcg daily  She should also stay on her multi vitamin  She does have poor sleep, was grieving the sudden loss of her mother earlier this year, her weight loss did accelerate after her mom passed away in March  She feels she is not depressed  Has had night sweats for years  I would like to re-evaluate again in 3 to 4 weeks regarding weight            Chief Complaint     Chief Complaint   Patient presents with    Follow-up       History of Present Illness   Cristy Hendricks is a 60y o -year-old female who is in today to discuss further weight loss, see previous visits with me, weight loss significantly started in March, she denies depressive symptoms, she feels she is dealing with the death of her mom okay, she does have some issues with sleep  She relates she is eating 3 meals daily, has not been tracking calories  Unfortunately she has been using her thyroid medication only every other day, not 6 times weekly  Previously we decreased to 6 times weekly, TSH was 0 152 back in May on 125 daily    She did have emergency room visit, had blood in stool along with abdominal pain  CT scanning was unremarkable  She has no current abdominal pain    Does note some blood in stool yet    She is not smoking      Review of Systems   Review of Systems Constitutional: Positive for unexpected weight change  Negative for appetite change, fatigue and fever  Long-term night sweats, present for years since went through menopause   HENT: Negative for sore throat and trouble swallowing  Respiratory: Negative for cough, chest tightness and shortness of breath  Cardiovascular: Negative for chest pain, palpitations and leg swelling  Gastrointestinal: Positive for blood in stool  Negative for abdominal pain, nausea and vomiting  No acid reflux   -no dysphagia  BM twice daily, notes postprandial BM   Genitourinary: Negative for dysuria and hematuria  Musculoskeletal: Negative for back pain and myalgias  Skin: Negative for rash  Neurological: Negative for dizziness, syncope, weakness, light-headedness and headaches  Hematological: Does not bruise/bleed easily  Psychiatric/Behavioral: Positive for sleep disturbance (She does awaken at night, she relates she gets only 4 to 5 hours at night)  Negative for behavioral problems and confusion  She denies depressive symptoms       Active Problem List     Patient Active Problem List   Diagnosis    Allergic rhinitis    Asplenia    Moderate persistent asthma without complication    Acquired hypothyroidism    Leiomyoma of uterus    History of ITP    Former smoker    Family history of breast cancer    Dense breast tissue    Cyst of skin of right breast    Hypercholesterolemia    H/O splenectomy ( ITP)       Past Medical History:  Reviewed    Past Surgical History:  Reviewed    Family History:  Reviewed    Social History:  Reviewed    Objective     Vitals:    06/24/21 1109   BP: 120/90   BP Location: Right arm   Patient Position: Sitting   Cuff Size: Standard   Pulse: 67   Temp: 98 °F (36 7 °C)   SpO2: 98%   Weight: 46 3 kg (102 lb)   Height: 5' (1 524 m)     Body mass index is 19 92 kg/m²      BP Readings from Last 3 Encounters:   06/24/21 120/90   06/03/21 165/80   06/01/21 156/74 Wt Readings from Last 3 Encounters:   06/24/21 46 3 kg (102 lb)   06/03/21 47 8 kg (105 lb 6 1 oz)   06/01/21 49 kg (108 lb 0 4 oz)       Physical Exam  Constitutional:       Comments: Thin female seated no acute distress   Eyes:      General: No scleral icterus  Cardiovascular:      Rate and Rhythm: Normal rate and regular rhythm  Heart sounds: Normal heart sounds  No murmur heard  Pulmonary:      Effort: Pulmonary effort is normal  No respiratory distress  Breath sounds: Normal breath sounds  No wheezing, rhonchi or rales  Abdominal:      General: There is no distension  Palpations: Abdomen is soft  Tenderness: There is no abdominal tenderness  There is no guarding  Musculoskeletal:      Right lower leg: No edema  Left lower leg: No edema  Lymphadenopathy:      Cervical: No cervical adenopathy  Skin:     Coloration: Skin is not jaundiced  Neurological:      Mental Status: She is oriented to person, place, and time  Mental status is at baseline     Psychiatric:         Behavior: Behavior normal          ALLERGIES:  Allergies   Allergen Reactions    Ibuprofen Anaphylaxis, Edema and Hives    Grass Extracts [Gramineae Pollens]     Molds & Smuts     Ragwitek [Short Ragweed Pollen Ext]     Aspirin Hives       Current Medications     Current Outpatient Medications   Medication Sig Dispense Refill    Advair -21 MCG/ACT inhaler Inhale 2 puffs 2 (two) times a day      AYR SALINE NASAL NO-DRIP NA 1 spray into each nostril daily at bedtime      cetirizine (ZyrTEC) 10 mg tablet Take 10 mg by mouth daily        fluticasone (FLONASE) 50 mcg/act nasal spray 2 sprays into each nostril daily 16 g 11    levothyroxine 125 mcg tablet Use 1 pill 6 times weekly on empty stomach (Patient taking differently: Use 1 tab every another day) 30 tablet 6    Multiple Vitamin (multivitamin) tablet Take 1 tablet by mouth daily      tiotropium (SPIRIVA RESPIMAT) 1 25 MCG/ACT AERS inhaler Inhale 2 puffs daily as needed       vitamin B-12 (CYANOCOBALAMIN) 500 MCG TABS Take 1 tablet (500 mcg total) by mouth daily      albuterol (VENTOLIN HFA) 90 mcg/act inhaler Inhale 2 puffs every 4 (four) hours as needed for wheezing 1 Inhaler 0     No current facility-administered medications for this visit              Orders Placed This Encounter   Procedures    TSH, 3rd generation    Ambulatory referral to Gastroenterology    POCT hemoglobin A1c         Deny Whitley DO

## 2021-06-24 NOTE — PATIENT INSTRUCTIONS
Reviewed previous visits, was last seen May 17th  Her weight at that time at 108 lb was decreased 14 lb verses March, she was down 25 lb verses 1 year previous  She is now down another 6 lb, 102 lb  BMI 19  I would like her to count her calories via phone samuel, do this over the course of 2 to 3 days, let us know what caloric intake she is getting  ADD Ensure one or two bottles daily for extra calories now  As in prior notes she had been advised to see Gastroenterology with colonoscopy, had seen GI in 2018, never did endoscopy  She does need Colonoscopy along with EGD regarding unintentional weight loss ( down 20 lbs vs March )  We reviewed emergency room visit with blood in stool, BM recently BID -   I again placed referral to GI, needs seen asap  ( she does relate issue w prep yrs ago )  She did have CT scanning of abdomen/pelvis June 3rd, that was unremarkable  Her TSH in May was 0 152, we had decreased levothyroxine to 125 mcg 6 times a week, unfortunately she has not been using her medication that way  She has been using levothyroxine 125 mcg every other day  I would like her to use this 6 times weekly on empty stomach, plan TSH again in 5 weeks  Note she is not tachycardic here today - does not have clinical findings over replacement thyroid  I do not believe her thyroid is responsible for her weight loss  She will be seeing Endocrinology July 26th to discuss labile thyroid -   Labile readings past yrs  We reviewed other blood work, June 3rd urinalysis was unremarkable, CBC showed WBC 7 9, hemoglobin 16, platelet count 287 K  Remote history splenectomy  Glucose nonfasting 149, A1c was run here today for completeness, 6 0  ( we discussed she is not diabetic, does have increased diabetic risk long-term )  Her B12 level in May was 253, she should use over-the-counter B12 500 mcg daily  She should also stay on her multi vitamin      She does have poor sleep, was grieving the sudden loss of her mother earlier this year, her weight loss did accelerate after her mom passed away in March  She feels she is not depressed  Has had night sweats for years      I would like to re-evaluate again in 3 to 4 weeks regarding weight

## 2021-06-25 ENCOUNTER — TELEPHONE (OUTPATIENT)
Dept: GASTROENTEROLOGY | Facility: CLINIC | Age: 56
End: 2021-06-25

## 2021-06-25 NOTE — TELEPHONE ENCOUNTER
----- Message from Radha Tafoya sent at 6/25/2021  9:53 AM EDT -----  Regarding: RE: appt need  Colon/egd scheduled for 07/01/21 @[facility] with Dr Betsy Aschoff Miralax/ducolax instructions given to pt verbally/mailed with [employer]  Patient does not have email    ----- Message -----  From: Travis Moore MD  Sent: 6/24/2021   2:57 PM EDT  To: Sari López DO, #  Subject: RE: appt need                                    Can we please schedule for EGD/colonoscopy asap with 1st available provider? Orders are in  Thanks   ----- Message -----  From: Sari López DO  Sent: 6/24/2021  11:34 AM EDT  To: Travis Moore MD  Subject: appt need                                        Can you please have your staff place her on your schedule for evaluation ASAP? She has had significant unintentional weight loss, some blood in stool, you had seen her in 2018, she had never done colonoscopy    See my note from today for full details

## 2021-06-25 NOTE — TELEPHONE ENCOUNTER
Tracking number for Amanda Ache [id number]    Gave our copy of [employer] slip to Trudy to scan into chart

## 2021-06-30 ENCOUNTER — TELEPHONE (OUTPATIENT)
Dept: GASTROENTEROLOGY | Facility: HOSPITAL | Age: 56
End: 2021-06-30

## 2021-07-01 ENCOUNTER — ANESTHESIA EVENT (OUTPATIENT)
Dept: GASTROENTEROLOGY | Facility: HOSPITAL | Age: 56
End: 2021-07-01

## 2021-07-01 ENCOUNTER — ANESTHESIA (OUTPATIENT)
Dept: GASTROENTEROLOGY | Facility: HOSPITAL | Age: 56
End: 2021-07-01

## 2021-07-01 ENCOUNTER — HOSPITAL ENCOUNTER (OUTPATIENT)
Dept: GASTROENTEROLOGY | Facility: HOSPITAL | Age: 56
Setting detail: OUTPATIENT SURGERY
Discharge: HOME | End: 2021-07-01
Attending: INTERNAL MEDICINE | Admitting: INTERNAL MEDICINE
Payer: COMMERCIAL

## 2021-07-01 VITALS
RESPIRATION RATE: 18 BRPM | SYSTOLIC BLOOD PRESSURE: 129 MMHG | TEMPERATURE: 97.5 F | OXYGEN SATURATION: 100 % | WEIGHT: 100 LBS | DIASTOLIC BLOOD PRESSURE: 72 MMHG | HEART RATE: 58 BPM | HEIGHT: 60 IN | BODY MASS INDEX: 19.63 KG/M2

## 2021-07-01 DIAGNOSIS — R63.4 UNINTENTIONAL WEIGHT LOSS: ICD-10-CM

## 2021-07-01 DIAGNOSIS — Z12.11 SCREENING FOR COLON CANCER: ICD-10-CM

## 2021-07-01 DIAGNOSIS — R10.84 GENERALIZED ABDOMINAL PAIN: ICD-10-CM

## 2021-07-01 PROCEDURE — 45380 COLONOSCOPY AND BIOPSY: CPT | Performed by: INTERNAL MEDICINE

## 2021-07-01 PROCEDURE — 88305 TISSUE EXAM BY PATHOLOGIST: CPT | Performed by: PATHOLOGY

## 2021-07-01 PROCEDURE — 45385 COLONOSCOPY W/LESION REMOVAL: CPT | Performed by: INTERNAL MEDICINE

## 2021-07-01 PROCEDURE — 43239 EGD BIOPSY SINGLE/MULTIPLE: CPT | Performed by: INTERNAL MEDICINE

## 2021-07-01 RX ORDER — PROPOFOL 10 MG/ML
INJECTION, EMULSION INTRAVENOUS CONTINUOUS PRN
Status: DISCONTINUED | OUTPATIENT
Start: 2021-07-01 | End: 2021-07-01

## 2021-07-01 RX ORDER — SODIUM CHLORIDE 9 MG/ML
125 INJECTION, SOLUTION INTRAVENOUS CONTINUOUS
Status: DISCONTINUED | OUTPATIENT
Start: 2021-07-01 | End: 2021-07-05 | Stop reason: HOSPADM

## 2021-07-01 RX ORDER — PROPOFOL 10 MG/ML
INJECTION, EMULSION INTRAVENOUS AS NEEDED
Status: DISCONTINUED | OUTPATIENT
Start: 2021-07-01 | End: 2021-07-01

## 2021-07-01 RX ADMIN — SODIUM CHLORIDE 125 ML/HR: 0.9 INJECTION, SOLUTION INTRAVENOUS at 08:16

## 2021-07-01 RX ADMIN — PROPOFOL 150 MCG/KG/MIN: 10 INJECTION, EMULSION INTRAVENOUS at 09:22

## 2021-07-01 RX ADMIN — PROPOFOL 100 MG: 10 INJECTION, EMULSION INTRAVENOUS at 09:22

## 2021-07-01 NOTE — DISCHARGE INSTRUCTIONS
Upper Endoscopy   WHAT YOU NEED TO KNOW:   An upper endoscopy is also called an upper gastrointestinal (GI) endoscopy, or an esophagogastroduodenoscopy (EGD)  You may feel bloated, gassy, or have some abdominal discomfort after your procedure  Your throat may be sore for 24 to 36 hours  You may burp or pass gas from air that is still inside your body  DISCHARGE INSTRUCTIONS:   Call 911 if:   · You have sudden chest pain or trouble breathing  Seek care immediately if:   · You feel dizzy or faint  · You have trouble swallowing  · You have severe throat pain  · Your bowel movements are very dark or black  · Your abdomen is hard and firm and you have severe pain  · You vomit blood  Contact your healthcare provider if:   · You feel full or bloated and cannot burp or pass gas  · You have not had a bowel movement for 3 days after your procedure  · You have neck pain  · You have a fever or chills  · You have nausea or are vomiting  · You have a rash or hives  · You have questions or concerns about your endoscopy  Relieve a sore throat:  Suck on throat lozenges or crushed ice  Gargle with a small amount of warm salt water  Mix 1 teaspoon of salt and 1 cup of warm water to make salt water  Relieve gas and discomfort from bloating:  Lie on your right side with a heating pad on your abdomen  Take short walks to help pass gas  Eat small meals until bloating is relieved  Rest after your procedure:  Do not drive or make important decisions until the day after your procedure  Return to your normal activity as directed  You can usually return to work the day after your procedure  Follow up with your healthcare provider as directed:  Write down your questions so you remember to ask them during your visits  © Copyright [address] Information is for End User's use only and may not be sold, redistributed or otherwise used for commercial purposes   All illustrations and images included in CareNotes® are the copyrighted property of A D A M , Inc  or [facility]   The above information is an  only  It is not intended as medical advice for individual conditions or treatments  Talk to your doctor, nurse or pharmacist before following any medical regimen to see if it is safe and effective for you  Colonoscopy   WHAT YOU NEED TO KNOW:   A colonoscopy is a procedure to examine the inside of your colon (intestine) with a scope  Polyps or tissue growths may have been removed during your colonoscopy  It is normal to feel bloated and to have some abdominal discomfort  You should be passing gas  If you have hemorrhoids or you had polyps removed, you may have a small amount of bleeding  DISCHARGE INSTRUCTIONS:   Call your doctor if:   · You have a large amount of bright red blood in your bowel movements  · Your abdomen is hard and firm and you have severe pain  · You have sudden trouble breathing  · You develop a rash or hives  · You have a fever within 24 hours of your procedure  · You have not had a bowel movement for 3 days after your procedure  · You have questions or concerns about your condition or care  After your colonoscopy:   · Do not lift, strain, or run  for 3 days  · Rest as much as possible  You have been given medicine to relax you  Do not  drive or make important decisions for at least 24 hours  Return to your normal activity as directed  · Relieve gas and discomfort from bloating  by lying on your left side with a heating pad on your abdomen  You may need to take short walks to help the gas move out  Eat small meals until bloating is relieved  If you had polyps removed: For 7 days after your procedure:  · Do not  take aspirin  · Do not  go on long car rides  Help prevent constipation:   · Eat a variety of healthy foods    Healthy foods include fruit, vegetables, whole-grain breads, low-fat dairy products, beans, lean meat, and fish  Ask if you need to be on a special diet  Your healthcare provider may recommend that you eat high-fiber foods such as cooked beans  Fiber helps you have regular bowel movements  · Drink liquids as directed  Adults should drink between 9 and 13 eight-ounce cups of liquid every day  Ask what amount is best for you  For most people, good liquids to drink are water, juice, and milk  · Exercise as directed  Talk to your healthcare provider about the best exercise plan for you  Exercise can help prevent constipation, decrease your blood pressure and improve your health  Follow up with your healthcare provider as directed:  Write down your questions so you remember to ask them during your visits  © Copyright [address] Information is for End User's use only and may not be sold, redistributed or otherwise used for commercial purposes  All illustrations and images included in CareNotes® are the copyrighted property of A D A M , Inc  or [employer]   The above information is an  only  It is not intended as medical advice for individual conditions or treatments  Talk to your doctor, nurse or pharmacist before following any medical regimen to see if it is safe and effective for you  Colorectal Polyps   WHAT YOU NEED TO KNOW:   Colorectal polyps are small growths of tissue in the lining of the colon and rectum  Most polyps are hyperplastic polyps and are usually benign (noncancerous)  Certain types of polyps, called adenomatous polyps, may turn into cancer  DISCHARGE INSTRUCTIONS:   Follow up with your healthcare provider or gastroenterologist as directed: You may need to return for more tests, such as another colonoscopy  Write down your questions so you remember to ask them during your visits    Reduce your risk for colorectal polyps:   · Eat a variety of healthy foods:  Healthy foods include fruit, vegetables, whole-grain breads, low-fat dairy products, beans, lean meat, and fish  Ask if you need to be on a special diet  · Maintain a healthy weight:  Ask your healthcare provider if you need to lose weight and how much you need to lose  Ask for help with a weight loss program     · Exercise:  Begin to exercise slowly and do more as you get stronger  Talk with your healthcare provider before you start an exercise program      · Limit alcohol:  Your risk for polyps increases the more you drink  · Do not smoke: If you smoke, it is never too late to quit  Ask for information about how to stop  For support and more information:   · Beny Harper ([facility])  [address], [place] [address]  Phone: 6- [phone] - [phone]  Web Address: www digestive  niddk nih gov    Contact your healthcare provider or gastroenterologist if:   · You have a fever  · You have chills, a cough, or feel weak and achy  · You have abdominal pain that does not go away or gets worse after you take medicine  · Your abdomen is swollen  · You are losing weight without trying  · You have questions or concerns about your condition or care  Seek care immediately or call 911 if:   · You have sudden shortness of breath  · You have a fast heart rate, fast breathing, or are too dizzy to stand up  · You have severe abdominal pain  · You see blood in your bowel movement  © Copyright [address] Information is for End User's use only and may not be sold, redistributed or otherwise used for commercial purposes  All illustrations and images included in CareNotes® are the copyrighted property of A D A M , Inc  or [address] [place], [place] [address]   The above information is an  only  It is not intended as medical advice for individual conditions or treatments  Talk to your doctor, nurse or pharmacist before following any medical regimen to see if it is safe and effective for you

## 2021-07-01 NOTE — ANESTHESIA PREPROCEDURE EVALUATION
Procedure:  EGD  COLONOSCOPY    Relevant Problems   CARDIO   (+) Hypercholesterolemia      ENDO   (+) Acquired hypothyroidism      NEURO/PSYCH   (+) History of ITP      PULMONARY   (+) Moderate persistent asthma without complication      Other   (+) Asplenia   (+) Former smoker   (+) H/O splenectomy ( ITP)        Physical Exam    Airway    Mallampati score: II  TM Distance: >3 FB  Neck ROM: full     Dental   No notable dental hx     Cardiovascular  Rhythm: regular, Rate: normal, Cardiovascular exam normal    Pulmonary  Pulmonary exam normal Breath sounds clear to auscultation,     Other Findings        Anesthesia Plan  ASA Score- 2     Anesthesia Type- general and IV sedation with anesthesia with ASA Monitors  Additional Monitors:   Airway Plan:           Plan Factors-    Chart reviewed  Patient summary reviewed  Patient is not a current smoker  Patient instructed to abstain from smoking on day of procedure  Patient did not smoke on day of surgery  Induction- intravenous  Postoperative Plan-     Informed Consent- Anesthetic plan and risks discussed with patient and spouse  I personally reviewed this patient with the CRNA  Discussed and agreed on the Anesthesia Plan with the CRNA  Natty Loera

## 2021-07-01 NOTE — H&P
were no vitals taken for this visit  PHYSICAL EXAMINATION:    General Appearance:   Alert, cooperative, no distress   HEENT:  Normocephalic, atraumatic, anicteric  Neck supple, symmetrical, trachea midline  Lungs:   Equal chest rise and unlabored breathing, normal effort, no coughing  Cardiovascular:   No visualized JVD  Abdomen:   No abdominal distension  Skin:   No jaundice, rashes, or lesions  Musculoskeletal:   Normal range of motion visualized  Psych:  Normal affect and normal insight  Neuro:  Alert and appropriate  ASSESSMENT/PLAN:  This is a 60y o  year old female here for EGD and colonosocpy, and she is stable and optimized for her procedure

## 2021-07-13 ENCOUNTER — APPOINTMENT (OUTPATIENT)
Dept: LAB | Facility: HOSPITAL | Age: 56
End: 2021-07-13
Payer: COMMERCIAL

## 2021-07-13 DIAGNOSIS — E03.9 ACQUIRED HYPOTHYROIDISM: ICD-10-CM

## 2021-07-13 DIAGNOSIS — R73.9 BORDERLINE HYPERGLYCEMIA: ICD-10-CM

## 2021-07-13 LAB
EST. AVERAGE GLUCOSE BLD GHB EST-MCNC: 114 MG/DL
HBA1C MFR BLD: 5.6 %
TSH SERPL DL<=0.05 MIU/L-ACNC: 1.19 UIU/ML (ref 0.36–3.74)

## 2021-07-13 PROCEDURE — 36415 COLL VENOUS BLD VENIPUNCTURE: CPT

## 2021-07-13 PROCEDURE — 84443 ASSAY THYROID STIM HORMONE: CPT

## 2021-07-13 PROCEDURE — 83036 HEMOGLOBIN GLYCOSYLATED A1C: CPT

## 2021-07-15 ENCOUNTER — OFFICE VISIT (OUTPATIENT)
Dept: FAMILY MEDICINE CLINIC | Facility: CLINIC | Age: 56
End: 2021-07-15
Payer: COMMERCIAL

## 2021-07-15 VITALS
WEIGHT: 101 LBS | BODY MASS INDEX: 19.83 KG/M2 | DIASTOLIC BLOOD PRESSURE: 60 MMHG | SYSTOLIC BLOOD PRESSURE: 130 MMHG | HEIGHT: 60 IN | HEART RATE: 50 BPM | OXYGEN SATURATION: 99 % | TEMPERATURE: 98 F

## 2021-07-15 DIAGNOSIS — R63.4 WEIGHT LOSS, ABNORMAL: Primary | ICD-10-CM

## 2021-07-15 DIAGNOSIS — E03.9 ACQUIRED HYPOTHYROIDISM: ICD-10-CM

## 2021-07-15 DIAGNOSIS — K29.00 ACUTE GASTRITIS WITHOUT HEMORRHAGE, UNSPECIFIED GASTRITIS TYPE: ICD-10-CM

## 2021-07-15 PROCEDURE — 3008F BODY MASS INDEX DOCD: CPT | Performed by: FAMILY MEDICINE

## 2021-07-15 PROCEDURE — 99214 OFFICE O/P EST MOD 30 MIN: CPT | Performed by: FAMILY MEDICINE

## 2021-07-15 RX ORDER — FAMOTIDINE 20 MG/1
20 TABLET, FILM COATED ORAL 2 TIMES DAILY
Qty: 60 TABLET | Refills: 3 | Status: SHIPPED | OUTPATIENT
Start: 2021-07-15 | End: 2022-06-16

## 2021-07-15 RX ORDER — LEVOTHYROXINE SODIUM 0.12 MG/1
125 TABLET ORAL DAILY
Start: 2021-07-15 | End: 2021-09-08

## 2021-07-15 NOTE — PROGRESS NOTES
FAMILY PRACTICE OFFICE VISIT  [facility]  [address]  [address], [place], [address]      NAME: Mell Lauren  AGE: 60 y o  SEX: female  : 1965   MRN: [id number]    DATE: 7/15/2021  TIME: 1:00 PM    Assessment and Plan     Problem List Items Addressed This Visit        Endocrine    Acquired hypothyroidism    Relevant Medications    levothyroxine 125 mcg tablet       Other    Weight loss, abnormal - Primary      Other Visit Diagnoses     Acute gastritis without hemorrhage, unspecified gastritis type        Relevant Medications    famotidine (PEPCID) 20 mg tablet          Patient Instructions   She is in today for a re-evaluation, she has dropped another lb  She is now down 20 lb vs  lb vs late   She had initially dropped quite a bit of weight with not eating for 1 week after her mom's sudden passing back in March -  she now relates she is eating 2 to 3 meals daily, I would like her to increase her Ensure to twice daily  She remained on levothyroxine 125 mcg 7 times weekly/once daily, she never dropped to 6 times weekly  Recent TSH is fine at 1  19  As in prior notes has had labile TSH readings, she will be seeing Endocrinology later this month  She will continue on 125 mcg daily as is for now  Previous B12 slightly low, continue over-the-counter 500 mcg B12 daily  Also continue multivitamin  Other blood work had been unremarkable, A1c in     We reviewed CT scanning abdomen/pelvis looked okay  Her colonoscopy showed tubular adenoma, she should redo that in 3 years  Her EGD did show gastric erosion, biopsies were negative for H pylori, negative for dysplasia  I would like her to use famotidine/Pepcid twice daily, recheck with me in 6 weeks, if notes further weight loss we may need to redo EGD  I would like her to keep a food diary for 3 days, drop that off with us    Consider nutritional consult  She denies depressive symptoms  Chief Complaint     Chief Complaint   Patient presents with    Follow-up     weight loss       History of Present Illness   Cathleen Beverly is a 60y o -year-old female who is in today for a weight check/re-evaluation, see previous visits regarding weight loss, she is now down to 101 lb  Has dropped to 1 more lb the past few weeks  As in prior notes she initially lost quite some weight as she did not eat for a week after her mom  suddenly in March but she relates she is eating meals but continues to lose weight  She relates she is using Ensure  She did undergo EGD/colonoscopy, had gastritis with erosion, does have some upper abdominal discomfort  No melena  She relates her appetite is good  Regarding thyroid medication she is currently using 1 pill daily, appears compliance with medication has been off at times through the years, TSH has fluctuated, she will be seeing Endocrinology later this month  Her most recent TSH is fine    Her CT scanning was unremarkable    She denies depression      Review of Systems   Review of Systems   Constitutional: Positive for fatigue and unexpected weight change  Negative for appetite change and fever  HENT: Negative for sore throat and trouble swallowing  Respiratory: Negative for cough, chest tightness and shortness of breath  Cardiovascular: Negative for chest pain, palpitations and leg swelling  Gastrointestinal: Positive for abdominal pain (See HPI)  Negative for blood in stool, nausea and vomiting  No acid reflux     No change in bowel   Genitourinary: Negative for dysuria and hematuria  Neurological: Negative for dizziness, syncope, light-headedness and headaches  Psychiatric/Behavioral: Negative for behavioral problems and confusion         Active Problem List     Patient Active Problem List   Diagnosis    Allergic rhinitis    Asplenia    Moderate persistent asthma without complication  Acquired hypothyroidism    Leiomyoma of uterus    History of ITP    Former smoker    Family history of breast cancer    Dense breast tissue    Cyst of skin of right breast    Hypercholesterolemia    H/O splenectomy ( ITP)    Weight loss, abnormal       Past Medical History:  Reviewed    Past Surgical History:  Reviewed    Family History:  Reviewed    Social History:  Reviewed    Objective     Vitals:    07/15/21 0821   BP: 130/60   BP Location: Left arm   Patient Position: Sitting   Cuff Size: Standard   Pulse: (!) 50   Temp: 98 °F (36 7 °C)   SpO2: 99%   Weight: 45 8 kg (101 lb)   Height: 5' (1 524 m)     Body mass index is 19 73 kg/m²  BP Readings from Last 3 Encounters:   07/15/21 130/60   07/01/21 129/72   06/24/21 120/90       Wt Readings from Last 3 Encounters:   07/15/21 45 8 kg (101 lb)   07/01/21 45 4 kg (100 lb)   06/24/21 46 3 kg (102 lb)       Physical Exam  Constitutional:       Appearance: She is well-developed  Comments: Thin 43-year-old female, looks well   Eyes:      General: No scleral icterus  Cardiovascular:      Rate and Rhythm: Normal rate and regular rhythm  Heart sounds: Normal heart sounds  No murmur heard  Pulmonary:      Effort: Pulmonary effort is normal  No respiratory distress  Breath sounds: Normal breath sounds  Abdominal:      Comments: Abdomen is soft, very mild epigastric discomfort without guard, rebound  Musculoskeletal:      Right lower leg: No edema  Left lower leg: No edema  Lymphadenopathy:      Cervical: No cervical adenopathy  Skin:     Coloration: Skin is not jaundiced  Neurological:      Mental Status: She is oriented to person, place, and time     Psychiatric:         Mood and Affect: Mood normal          Behavior: Behavior normal          ALLERGIES:  Allergies   Allergen Reactions    Ibuprofen Anaphylaxis, Edema and Hives    Grass Extracts [Gramineae Pollens]     Molds & Smuts     Ragwitek [Short Ragweed Pollen Ext]  Aspirin Hives       Current Medications     Current Outpatient Medications   Medication Sig Dispense Refill    Advair -21 MCG/ACT inhaler Inhale 2 puffs 2 (two) times a day      AYR SALINE NASAL NO-DRIP NA 1 spray into each nostril daily at bedtime      cetirizine (ZyrTEC) 10 mg tablet Take 10 mg by mouth daily        famotidine (PEPCID) 20 mg tablet Take 1 tablet (20 mg total) by mouth 2 (two) times a day 60 tablet 3    fluticasone (FLONASE) 50 mcg/act nasal spray 2 sprays into each nostril daily 16 g 11    levothyroxine 125 mcg tablet Take 1 tablet (125 mcg total) by mouth daily      Multiple Vitamin (multivitamin) tablet Take 1 tablet by mouth daily      tiotropium (SPIRIVA RESPIMAT) 1 25 MCG/ACT AERS inhaler Inhale 2 puffs daily as needed       vitamin B-12 (CYANOCOBALAMIN) 500 MCG TABS Take 1 tablet (500 mcg total) by mouth daily      albuterol (VENTOLIN HFA) 90 mcg/act inhaler Inhale 2 puffs every 4 (four) hours as needed for wheezing 1 Inhaler 0    polyethylene glycol (GLYCOLAX) 17 GM/SCOOP powder At 5pm take 5mgx2 dulcolax  At 6pm mix 238 g miralax in 64oz gatorade  Drink 8oz glass every 5 mins until 32oz finished  Drink remaining as rec  (Patient not taking: Reported on 7/15/2021) 238 g 0     No current facility-administered medications for this visit  No orders of the defined types were placed in this encounter          Fauzia Camacho DO

## 2021-07-15 NOTE — PATIENT INSTRUCTIONS
She is in today for a re-evaluation, she has dropped another lb  She is now down 20 lb vs March, 2030 lb vs late 2020  She had initially dropped quite a bit of weight with not eating for 1 week after her mom's sudden passing back in March -  she now relates she is eating 2 to 3 meals daily, I would like her to increase her Ensure to twice daily  She remained on levothyroxine 125 mcg 7 times weekly/once daily, she never dropped to 6 times weekly  Recent TSH is fine at 1  19  As in prior notes has had labile TSH readings, she will be seeing Endocrinology later this month  She will continue on 125 mcg daily as is for now  Previous B12 slightly low, continue over-the-counter 500 mcg B12 daily  Also continue multivitamin  Other blood work had been unremarkable, A1c in July 5 6    We reviewed CT scanning abdomen/pelvis looked okay  Her colonoscopy showed tubular adenoma, she should redo that in 3 years  Her EGD did show gastric erosion, biopsies were negative for H pylori, negative for dysplasia  I would like her to use famotidine/Pepcid twice daily, recheck with me in 6 weeks, if notes further weight loss we may need to redo EGD  I would like her to keep a food diary for 3 days, drop that off with us  Consider nutritional consult  She denies depressive symptoms

## 2021-07-23 ENCOUNTER — TELEPHONE (OUTPATIENT)
Dept: FAMILY MEDICINE CLINIC | Facility: CLINIC | Age: 56
End: 2021-07-23

## 2021-07-23 NOTE — TELEPHONE ENCOUNTER
Noemi Perez from [facility] endocrinology left a vm in which I returned stating she needs medical records to be faxed over to the office since the pt is going to be a new patient and they need the doctor in there office to review her records  She stated she spoke with callie 3 times and nothing was faxed over  I did get 2 different faxes over for this office to try and fax the records over today   I informed johny and stated she will take care of It

## 2021-08-03 ENCOUNTER — APPOINTMENT (OUTPATIENT)
Dept: LAB | Facility: HOSPITAL | Age: 56
End: 2021-08-03
Payer: COMMERCIAL

## 2021-08-03 DIAGNOSIS — R63.4 ABNORMAL LOSS OF WEIGHT: ICD-10-CM

## 2021-08-03 LAB — CORTIS SERPL-MCNC: 18.4 UG/DL

## 2021-08-03 PROCEDURE — 36415 COLL VENOUS BLD VENIPUNCTURE: CPT

## 2021-08-03 PROCEDURE — 82533 TOTAL CORTISOL: CPT

## 2021-08-11 ENCOUNTER — TELEPHONE (OUTPATIENT)
Dept: FAMILY MEDICINE CLINIC | Facility: CLINIC | Age: 56
End: 2021-08-11

## 2021-08-24 ENCOUNTER — TELEPHONE (OUTPATIENT)
Dept: FAMILY MEDICINE CLINIC | Facility: CLINIC | Age: 56
End: 2021-08-24

## 2021-08-24 DIAGNOSIS — K04.7 DENTAL INFECTION: Primary | ICD-10-CM

## 2021-08-24 RX ORDER — AMOXICILLIN 875 MG/1
875 TABLET, COATED ORAL 2 TIMES DAILY
Qty: 20 TABLET | Refills: 0 | Status: SHIPPED | OUTPATIENT
Start: 2021-08-24 | End: 2021-09-03

## 2021-08-24 NOTE — TELEPHONE ENCOUNTER
Pt called in, she has an appointment on September 1st with the dentist and wants to know if you can prescribe her amoxacillin for her tooth that broke

## 2021-08-24 NOTE — TELEPHONE ENCOUNTER
Spoke with patient , informed about Rx send to the pharmacy   Visit in 8/26/21 was cancel , patient is going to keep physical on 8/30/21 at 10:30AM

## 2021-08-24 NOTE — TELEPHONE ENCOUNTER
Please let her know I sent in amoxicillin to her local [facility]   Also clarify, I believe she is on the scheduled to see me this Thursday August 26 along with next Monday August 30th    She only needs 1 of those appointments

## 2021-08-25 ENCOUNTER — TELEPHONE (OUTPATIENT)
Dept: GASTROENTEROLOGY | Facility: MEDICAL CENTER | Age: 56
End: 2021-08-25

## 2021-08-25 NOTE — TELEPHONE ENCOUNTER
Patient called back and stated she is doing well and does not need a f/u currently  She stated she does not have any abdominal pain anymore and will call if she needs an appointment

## 2021-08-30 ENCOUNTER — OFFICE VISIT (OUTPATIENT)
Dept: FAMILY MEDICINE CLINIC | Facility: CLINIC | Age: 56
End: 2021-08-30
Payer: COMMERCIAL

## 2021-08-30 VITALS
WEIGHT: 99 LBS | HEART RATE: 54 BPM | SYSTOLIC BLOOD PRESSURE: 134 MMHG | DIASTOLIC BLOOD PRESSURE: 70 MMHG | BODY MASS INDEX: 19.44 KG/M2 | HEIGHT: 60 IN | TEMPERATURE: 97.7 F

## 2021-08-30 DIAGNOSIS — R63.4 WEIGHT LOSS, ABNORMAL: ICD-10-CM

## 2021-08-30 DIAGNOSIS — Z11.1 ENCOUNTER FOR PPD SKIN TEST READING: ICD-10-CM

## 2021-08-30 DIAGNOSIS — Z23 NEED FOR PNEUMOCOCCAL VACCINATION: ICD-10-CM

## 2021-08-30 DIAGNOSIS — E03.9 ACQUIRED HYPOTHYROIDISM: Primary | ICD-10-CM

## 2021-08-30 DIAGNOSIS — K25.9 GASTRIC EROSION DETERMINED BY ENDOSCOPY: ICD-10-CM

## 2021-08-30 DIAGNOSIS — Z90.81 H/O SPLENECTOMY: ICD-10-CM

## 2021-08-30 DIAGNOSIS — Q89.01 ASPLENIA: ICD-10-CM

## 2021-08-30 PROCEDURE — 99214 OFFICE O/P EST MOD 30 MIN: CPT | Performed by: FAMILY MEDICINE

## 2021-08-30 PROCEDURE — 90732 PPSV23 VACC 2 YRS+ SUBQ/IM: CPT

## 2021-08-30 PROCEDURE — 86580 TB INTRADERMAL TEST: CPT

## 2021-08-30 RX ORDER — MIRTAZAPINE 15 MG/1
15 TABLET, FILM COATED ORAL
Qty: 30 TABLET | Refills: 3 | Status: SHIPPED | OUTPATIENT
Start: 2021-08-30 | End: 2022-01-05

## 2021-08-30 NOTE — PROGRESS NOTES
FAMILY PRACTICE OFFICE VISIT  [facility]  [address]  [address] [place], [place], [address]      NAME: Elita Hamman  AGE: 60 y o  SEX: female  : 1965   MRN: [id number]    DATE: 2021  TIME: 11:24 AM    Assessment and Plan     Problem List Items Addressed This Visit        Endocrine    Acquired hypothyroidism - Primary    Relevant Orders    TSH, 3rd generation       Other    Asplenia    Relevant Orders    PNEUMOCOCCAL POLYSACCHARIDE VACCINE 23-VALENT =>1YO SQ IM (Completed)    H/O splenectomy ( ITP)    Relevant Orders    PNEUMOCOCCAL POLYSACCHARIDE VACCINE 23-VALENT =>1YO SQ IM (Completed)    Weight loss, abnormal    Relevant Medications    mirtazapine (REMERON) 15 mg tablet    Other Relevant Orders    TSH, 3rd generation    Ambulatory referral to Gastroenterology      Other Visit Diagnoses     Need for pneumococcal vaccination        Relevant Orders    PNEUMOCOCCAL POLYSACCHARIDE VACCINE 23-VALENT =>1YO SQ IM (Completed)    Encounter for PPD skin test reading        Relevant Orders    TB Skin Test (Completed)    Gastric erosion determined by endoscopy        Relevant Orders    Ambulatory referral to Gastroenterology          Patient Instructions   Regarding weight loss, at July visit she was down 20 lb over 4 months, had been down 30 lb verses the prior year  She has dropped another 2 lb, she is currently 99 lb   She did see Gastroenterology, had EGD showing erosion in , I would like her to re-evaluate with GI to discuss possible redo EGD although she denies any current GI symptoms and is currently off famotidine  She denies depressive symptoms but for weight gain I would like her to use Remeron/mirtazapine 15 mg at bedtime, she can use 7 5 mg at night for the 1st one-week watching for over sedation    She had developed some loose stools with Ensure, she can try different brand/ flavor/ boost -  she is eating meals 3 times daily, by history caloric intake appears adequate  Her colonoscopy screening was done in June, history adenoma, redo colonoscopy 2024, sooner if indicated  Reviewed visit with endocrinology earlier in month, TSH in July 1 19 on 125 mcg levothyroxine daily, I would like her to redo TSH again  regarding labile TSH readings in the past, continue to use medication on empty stomach  She should continue on B12 500    Family history breast cancer, her mammogram was okay last November  She did call us for a prescription amoxicillin last week, has dental disorder, she will be seeing dentist soon  History splenectomy, history ITP, platelet count has been stable for years  Her last pneumococcal vaccine was 10 years ago, she can redo pneumococcal 23 vaccine  PPD also done today as she is starting work,  at school  Would like to see her again in 2 to 3 months to re-evaluate, full physical at that time, her last full physical was September 14th of last year      Chief Complaint     Chief Complaint   Patient presents with    Physical Exam     need physical and PPD for work       History of Present Illness   Duglas Sanchez is a 60y o -year-old female who is in for a re-evaluation regarding weight loss, she relates she has had no stomach issues, she stopped famotidine few weeks ago  She is eating 3 meals daily, caloric count she describes to me today appears adequate but she has dropped another few lb, she is currently 99 lb, unsure why she continues to lose weight  She denies depressive symptoms  She feels overall well  She canceled her visit with GI as she felt well     She is starting work, clerical at school, she needs PPD  She also is due for a Pneumo 23, history splenectomy      She did see Endocrinology, last TSH was fine on 125 mcg levothyroxine daily, she is using that on empty stomach, previously had very labile TSH readings, unclear cause      Review of Systems   Review of Systems Constitutional: Positive for unexpected weight change  Negative for appetite change, fatigue and fever  HENT: Negative for sore throat and trouble swallowing  Respiratory: Negative for cough, chest tightness and shortness of breath  Has not required rescue inhaler recently   Cardiovascular: Negative for chest pain, palpitations and leg swelling  Gastrointestinal: Negative for abdominal pain, blood in stool, nausea and vomiting  No acid reflux     No change in bowel   Endocrine:        Occasional hot flash   Genitourinary: Negative for dysuria and hematuria  Neurological: Negative for dizziness, syncope, light-headedness and headaches  Psychiatric/Behavioral: Negative for behavioral problems and confusion  Active Problem List     Patient Active Problem List   Diagnosis    Allergic rhinitis    Asplenia    Moderate persistent asthma without complication    Acquired hypothyroidism    Leiomyoma of uterus    History of ITP    Former smoker    Family history of breast cancer    Dense breast tissue    Cyst of skin of right breast    Hypercholesterolemia    H/O splenectomy ( ITP)    Weight loss, abnormal       Past Medical History:  Reviewed    Past Surgical History:  Reviewed    Family History:  Reviewed    Social History:  Reviewed    Objective     Vitals:    08/30/21 1045   BP: 134/70   BP Location: Right arm   Patient Position: Sitting   Cuff Size: Standard   Pulse: (!) 54   Temp: 97 7 °F (36 5 °C)   Weight: 44 9 kg (99 lb)   Height: 5' (1 524 m)     Body mass index is 19 33 kg/m²  BP Readings from Last 3 Encounters:   08/30/21 134/70   07/15/21 130/60   07/01/21 129/72       Wt Readings from Last 3 Encounters:   08/30/21 44 9 kg (99 lb)   07/15/21 45 8 kg (101 lb)   07/01/21 45 4 kg (100 lb)       Physical Exam  Constitutional:       Appearance: She is not ill-appearing, toxic-appearing or diaphoretic  Comments:  Thin female, very noticeable weight loss vs 6 months ago but otherwise she appears well, no acute distress   Eyes:      General: No scleral icterus  Cardiovascular:      Rate and Rhythm: Normal rate and regular rhythm  Heart sounds: Normal heart sounds  No murmur heard  Pulmonary:      Effort: Pulmonary effort is normal  No respiratory distress  Breath sounds: Normal breath sounds  No wheezing, rhonchi or rales  Abdominal:      General: There is no distension  Palpations: Abdomen is soft  Tenderness: There is no abdominal tenderness  There is no guarding  Musculoskeletal:      Right lower leg: No edema  Left lower leg: No edema  Lymphadenopathy:      Cervical: No cervical adenopathy  Skin:     Coloration: Skin is not jaundiced  Neurological:      Mental Status: She is oriented to person, place, and time     Psychiatric:         Behavior: Behavior normal          ALLERGIES:  Allergies   Allergen Reactions    Ibuprofen Anaphylaxis, Edema and Hives    Grass Extracts [Gramineae Pollens]     Molds & Smuts     Ragwitek [Short Ragweed Pollen Ext]     Aspirin Hives       Current Medications     Current Outpatient Medications   Medication Sig Dispense Refill    Advair -21 MCG/ACT inhaler Inhale 2 puffs 2 (two) times a day      amoxicillin (AMOXIL) 875 mg tablet Take 1 tablet (875 mg total) by mouth 2 (two) times a day for 10 days 20 tablet 0    AYR SALINE NASAL NO-DRIP NA 1 spray into each nostril daily at bedtime      cetirizine (ZyrTEC) 10 mg tablet Take 10 mg by mouth daily        fluticasone (FLONASE) 50 mcg/act nasal spray 2 sprays into each nostril daily 16 g 11    levothyroxine 125 mcg tablet Take 1 tablet (125 mcg total) by mouth daily      Multiple Vitamin (multivitamin) tablet Take 1 tablet by mouth daily      tiotropium (SPIRIVA RESPIMAT) 1 25 MCG/ACT AERS inhaler Inhale 2 puffs daily as needed       vitamin B-12 (CYANOCOBALAMIN) 500 MCG TABS Take 1 tablet (500 mcg total) by mouth daily      albuterol (VENTOLIN HFA) 90 mcg/act inhaler Inhale 2 puffs every 4 (four) hours as needed for wheezing 1 Inhaler 0    famotidine (PEPCID) 20 mg tablet Take 1 tablet (20 mg total) by mouth 2 (two) times a day (Patient not taking: Reported on 8/30/2021) 60 tablet 3    mirtazapine (REMERON) 15 mg tablet Take 1 tablet (15 mg total) by mouth daily at bedtime 30 tablet 3     No current facility-administered medications for this visit              Orders Placed This Encounter   Procedures    PNEUMOCOCCAL POLYSACCHARIDE VACCINE 23-VALENT =>3YO SQ IM    TSH, 3rd generation    Ambulatory referral to Gastroenterology    TB Skin Test         Emmanuel Brantley DO

## 2021-08-30 NOTE — PATIENT INSTRUCTIONS
Regarding weight loss, at July visit she was down 20 lb over 4 months, had been down 30 lb verses the prior year  She has dropped another 2 lb, she is currently 99 lb   She did see Gastroenterology, had EGD showing erosion in June, I would like her to re-evaluate with GI to discuss possible redo EGD although she denies any current GI symptoms and is currently off famotidine  She denies depressive symptoms but for weight gain I would like her to use Remeron/mirtazapine 15 mg at bedtime, she can use 7 5 mg at night for the 1st one-week watching for over sedation  She had developed some loose stools with Ensure, she can try different brand/ flavor/ boost -  she is eating meals 3 times daily, by history caloric intake appears adequate  Her colonoscopy screening was done in June, history adenoma, redo colonoscopy 2024, sooner if indicated  Reviewed visit with endocrinology earlier in month, TSH in July 1 19 on 125 mcg levothyroxine daily, I would like her to redo TSH again  regarding labile TSH readings in the past, continue to use medication on empty stomach  She should continue on B12 500    Family history breast cancer, her mammogram was okay last November  She did call us for a prescription amoxicillin last week, has dental disorder, she will be seeing dentist soon  History splenectomy, history ITP, platelet count has been stable for years  Her last pneumococcal vaccine was 10 years ago, she can redo pneumococcal 23 vaccine  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

## 2021-09-01 LAB
INDURATION: 4 MM
TB SKIN TEST: NEGATIVE

## 2021-09-08 ENCOUNTER — APPOINTMENT (OUTPATIENT)
Dept: LAB | Facility: HOSPITAL | Age: 56
End: 2021-09-08
Payer: COMMERCIAL

## 2021-09-08 LAB — TSH SERPL DL<=0.05 MIU/L-ACNC: 0.06 UIU/ML (ref 0.36–3.74)

## 2021-09-08 PROCEDURE — 84443 ASSAY THYROID STIM HORMONE: CPT | Performed by: FAMILY MEDICINE

## 2021-09-08 PROCEDURE — 36415 COLL VENOUS BLD VENIPUNCTURE: CPT | Performed by: FAMILY MEDICINE

## 2021-11-17 ENCOUNTER — OFFICE VISIT (OUTPATIENT)
Dept: GASTROENTEROLOGY | Facility: MEDICAL CENTER | Age: 56
End: 2021-11-17
Payer: COMMERCIAL

## 2021-11-17 ENCOUNTER — APPOINTMENT (OUTPATIENT)
Dept: LAB | Facility: MEDICAL CENTER | Age: 56
End: 2021-11-17
Payer: COMMERCIAL

## 2021-11-17 VITALS
WEIGHT: 98.4 LBS | BODY MASS INDEX: 19.22 KG/M2 | DIASTOLIC BLOOD PRESSURE: 83 MMHG | HEART RATE: 54 BPM | SYSTOLIC BLOOD PRESSURE: 161 MMHG | TEMPERATURE: 97.5 F

## 2021-11-17 DIAGNOSIS — E03.9 ACQUIRED HYPOTHYROIDISM: ICD-10-CM

## 2021-11-17 DIAGNOSIS — R63.4 WEIGHT LOSS, ABNORMAL: Primary | ICD-10-CM

## 2021-11-17 LAB — TSH SERPL DL<=0.05 MIU/L-ACNC: 0.15 UIU/ML (ref 0.36–3.74)

## 2021-11-17 PROCEDURE — 84443 ASSAY THYROID STIM HORMONE: CPT

## 2021-11-17 PROCEDURE — 99213 OFFICE O/P EST LOW 20 MIN: CPT | Performed by: PHYSICIAN ASSISTANT

## 2021-11-17 PROCEDURE — 36415 COLL VENOUS BLD VENIPUNCTURE: CPT

## 2022-02-22 ENCOUNTER — HOSPITAL ENCOUNTER (EMERGENCY)
Facility: HOSPITAL | Age: 57
Discharge: HOME | End: 2022-02-22
Attending: EMERGENCY MEDICINE | Admitting: EMERGENCY MEDICINE
Payer: COMMERCIAL

## 2022-02-22 ENCOUNTER — APPOINTMENT (EMERGENCY)
Dept: RADIOLOGY | Facility: HOSPITAL | Age: 57
End: 2022-02-22
Payer: COMMERCIAL

## 2022-02-22 VITALS
BODY MASS INDEX: 19.86 KG/M2 | SYSTOLIC BLOOD PRESSURE: 160 MMHG | HEART RATE: 59 BPM | WEIGHT: 101.17 LBS | RESPIRATION RATE: 15 BRPM | OXYGEN SATURATION: 98 % | DIASTOLIC BLOOD PRESSURE: 82 MMHG | TEMPERATURE: 98.2 F | HEIGHT: 60 IN

## 2022-02-22 DIAGNOSIS — J20.9 ACUTE BRONCHITIS: Primary | ICD-10-CM

## 2022-02-22 DIAGNOSIS — I10 HTN (HYPERTENSION): ICD-10-CM

## 2022-02-22 PROCEDURE — 99283 EMERGENCY DEPT VISIT LOW MDM: CPT

## 2022-02-22 PROCEDURE — 99285 EMERGENCY DEPT VISIT HI MDM: CPT | Performed by: EMERGENCY MEDICINE

## 2022-02-22 PROCEDURE — 94640 AIRWAY INHALATION TREATMENT: CPT

## 2022-02-22 PROCEDURE — 87636 SARSCOV2 & INF A&B AMP PRB: CPT | Performed by: EMERGENCY MEDICINE

## 2022-02-22 PROCEDURE — 71045 X-RAY EXAM CHEST 1 VIEW: CPT

## 2022-02-22 RX ORDER — PREDNISONE 20 MG/1
40 TABLET ORAL DAILY
Qty: 8 TABLET | Refills: 0 | Status: SHIPPED | OUTPATIENT
Start: 2022-02-22 | End: 2022-02-26

## 2022-02-22 RX ORDER — AZITHROMYCIN 250 MG/1
TABLET, FILM COATED ORAL
Qty: 6 TABLET | Refills: 0 | Status: SHIPPED | OUTPATIENT
Start: 2022-02-22 | End: 2022-02-26

## 2022-02-22 RX ORDER — ALBUTEROL SULFATE 2.5 MG/3ML
5 SOLUTION RESPIRATORY (INHALATION) ONCE
Status: COMPLETED | OUTPATIENT
Start: 2022-02-22 | End: 2022-02-22

## 2022-02-22 RX ORDER — PREDNISONE 20 MG/1
60 TABLET ORAL ONCE
Status: COMPLETED | OUTPATIENT
Start: 2022-02-22 | End: 2022-02-22

## 2022-02-22 RX ADMIN — ALBUTEROL SULFATE 5 MG: 2.5 SOLUTION RESPIRATORY (INHALATION) at 10:06

## 2022-02-22 RX ADMIN — IPRATROPIUM BROMIDE 0.5 MG: 0.5 SOLUTION RESPIRATORY (INHALATION) at 10:06

## 2022-02-22 RX ADMIN — PREDNISONE 60 MG: 20 TABLET ORAL at 10:06

## 2022-02-22 NOTE — ED PROVIDER NOTES
History  Chief Complaint   Patient presents with    URI     congested, cough, pain with cough, using MDI with some relief       History provided by:  Patient and medical records   used: No    Medical Problem  Location:  Congestion, cough, green sputum, some sob  Asplenic  No fever  COVID vaccine x 2 (no booster)  H/O asthma/bronchitis in the past and has inhalers and nebulizers  + sick contact with coworker  Unsure if that person has covid,  Former smoker  Severity:  Moderate  Onset quality:  Gradual  Duration:  4 days  Timing:  Constant  Progression:  Worsening  Chronicity:  New  Relieved by:  Nothing  Worsened by:  Coughing  Ineffective treatments:  Inhaler  Associated symptoms: congestion, cough, rhinorrhea, shortness of breath and wheezing    Associated symptoms: no abdominal pain, no chest pain, no fever, no nausea and no vomiting        Prior to Admission Medications   Prescriptions Last Dose Informant Patient Reported? Taking?    AYR SALINE NASAL NO-DRIP NA  Self Yes No   Si spray into each nostril daily at bedtime   Advair -21 MCG/ACT inhaler   Yes No   Sig: Inhale 2 puffs 2 (two) times a day   Multiple Vitamin (multivitamin) tablet   Yes No   Sig: Take 1 tablet by mouth daily   albuterol (VENTOLIN HFA) 90 mcg/act inhaler  Self No No   Sig: Inhale 2 puffs every 4 (four) hours as needed for wheezing   cetirizine (ZyrTEC) 10 mg tablet  Self Yes No   Sig: Take 10 mg by mouth daily     famotidine (PEPCID) 20 mg tablet   No No   Sig: Take 1 tablet (20 mg total) by mouth 2 (two) times a day   Patient not taking: Reported on 2021   fluticasone (FLONASE) 50 mcg/act nasal spray  Self No No   Si sprays into each nostril daily   levothyroxine 125 mcg tablet   No No   Si pill FIVE (5) days a week   mirtazapine (REMERON) 15 mg tablet   No No   Sig: Take 1 tablet (15 mg total) by mouth daily at bedtime   tiotropium (SPIRIVA RESPIMAT) 1 25 MCG/ACT AERS inhaler  Self Yes No Sig: Inhale 2 puffs daily as needed    vitamin B-12 (CYANOCOBALAMIN) 500 MCG TABS   No No   Sig: Take 1 tablet (500 mcg total) by mouth daily      Facility-Administered Medications: None       Past Medical History:   Diagnosis Date    Accidental fall     LAST ASSESSED: [id number]    Asthma     Cellulitis of left thigh     LAST ASSESSED: [id number]    Colon polyp     Dermatitis     LAST ASSESSED: [id number]    Disease of thyroid gland     History of ITP     LAST ASSESSED: [id number]    Ingrown nail     RESOLVED: [id number]    Leg pain     Night sweats     Personal history of ovarian cyst     Pneumonia     LAST ASSESSED: [id number]    Right cervical radiculopathy     LAST ASSESSED: [id number]    Shortness of breath     Weight decreasing     unintentional       Past Surgical History:   Procedure Laterality Date    COLONOSCOPY      SPLENECTOMY      TUBAL LIGATION         Family History   Problem Relation Age of Onset    Breast cancer Sister 50        MALIGNANT NEOPLASM    Diabetes Family     Breast cancer Maternal Aunt 61    Stomach cancer Maternal Uncle 61    Skin cancer Maternal Uncle         age [de-identified]   [place] No Known Problems Daughter     No Known Problems Paternal Aunt     Hypertension Mother     Heart attack Mother      I have reviewed and agree with the history as documented  E-Cigarette/Vaping    E-Cigarette Use Never User      E-Cigarette/Vaping Substances     Social History     Tobacco Use    Smoking status: Former Smoker     Quit date: 2014     Years since quittin 1    Smokeless tobacco: Never Used    Tobacco comment: QUIT AROUND AGE 46   Vaping Use    Vaping Use: Never used   Substance Use Topics    Alcohol use: No    Drug use: No       Review of Systems   Constitutional: Negative for fever  HENT: Positive for congestion and rhinorrhea  Respiratory: Positive for cough, chest tightness, shortness of breath and wheezing  Cardiovascular: Negative for chest pain  Gastrointestinal: Negative for abdominal pain, nausea and vomiting  All other systems reviewed and are negative  Physical Exam  Physical Exam  Vitals and nursing note reviewed  Constitutional:       General: She is not in acute distress  Appearance: Normal appearance  She is well-developed  She is not ill-appearing, toxic-appearing or diaphoretic  HENT:      Head: Normocephalic and atraumatic  Comments: masked     Right Ear: Hearing normal  No drainage or swelling  Left Ear: Hearing normal  No drainage or swelling  Eyes:      General: Lids are normal          Right eye: No discharge  Left eye: No discharge  Conjunctiva/sclera: Conjunctivae normal    Neck:      Vascular: No JVD  Trachea: Trachea normal    Cardiovascular:      Rate and Rhythm: Normal rate and regular rhythm  Pulses: Normal pulses  Heart sounds: Normal heart sounds  No murmur heard  No friction rub  No gallop  Pulmonary:      Effort: Pulmonary effort is normal  No respiratory distress  Breath sounds: No stridor  Wheezing present  No rales  Chest:      Chest wall: No tenderness  Abdominal:      Palpations: Abdomen is soft  Tenderness: There is no abdominal tenderness  There is no guarding or rebound  Musculoskeletal:         General: Normal range of motion  Cervical back: Normal range of motion  Skin:     General: Skin is warm and dry  Coloration: Skin is not pale  Neurological:      General: No focal deficit present  Mental Status: She is alert  GCS: GCS eye subscore is 4  GCS verbal subscore is 5  GCS motor subscore is 6  Sensory: No sensory deficit  Motor: No abnormal muscle tone  Psychiatric:         Mood and Affect: Mood normal          Speech: Speech normal          Behavior: Behavior is cooperative           Vital Signs  ED Triage Vitals [02/22/22 0928]   Temperature Pulse Respirations Blood Pressure SpO2   98 2 °F (36 8 °C) 59 15 160/82 98 %      Temp Source Heart Rate Source Patient Position - Orthostatic VS BP Location FiO2 (%)   Oral -- -- Right arm --      Pain Score       6           Vitals:    02/22/22 0928   BP: 160/82   Pulse: 59         Visual Acuity      ED Medications  Medications   predniSONE tablet 60 mg (60 mg Oral Given 2/22/22 1006)   albuterol inhalation solution 5 mg (5 mg Nebulization Given 2/22/22 1006)   ipratropium (ATROVENT) 0 02 % inhalation solution 0 5 mg (0 5 mg Nebulization Given 2/22/22 1006)       Diagnostic Studies  Results Reviewed     Procedure Component Value Units Date/Time    COVID/FLU - 24 hour TAT [[id number]] Collected: 02/22/22 0958    Lab Status: In process Specimen: Nares from Nasopharyngeal Swab Updated: 02/22/22 1006                 XR chest 1 view portable   ED Interpretation by Tyrone Ruiz MD (02/22 1023)   I have personally reviewed the x-ray and my findings are: no acute disease  Final Result by Vinay Ragsdale MD (02/22 1031)      No acute cardiopulmonary disease  Workstation performed: [id number]                    Procedures  Procedures         ED Course                                             MDM  Number of Diagnoses or Management Options  Diagnosis management comments: Feels better after treatment  O2 sat normal  Afebrile  Will need to cover with antibiotics, steroids and encouraged more frequent nebs  Outpatient covid test sent  Encouraged to follow up with pcp         Amount and/or Complexity of Data Reviewed  Clinical lab tests: ordered  Tests in the radiology section of CPT®: ordered and reviewed  Independent visualization of images, tracings, or specimens: yes    Patient Progress  Patient progress: stable      Disposition  Final diagnoses:   Acute bronchitis   HTN (hypertension)     Time reflects when diagnosis was documented in both MDM as applicable and the Disposition within this note     Time User Action Codes Description Comment 2/22/2022 10:35 AM Rajiv Kelly J Add [J20 9] Acute bronchitis     2/22/2022 10:35 AM Rajiv Kelly Add [I10] HTN (hypertension)       ED Disposition     ED Disposition Condition Date/Time Comment    Discharge Stable Tue Feb 22, 2022 10:37 AM Sisi Mancillareema discharge to home/self care              Follow-up Information     Follow up With Specialties Details Why Contact Boubacar Boyd DO Family Medicine Schedule an appointment as soon as possible for a visit in 1 week reevaluation of symptoms and reevaluation of blood pressure [address]  [address] [address]  [phone]            Discharge Medication List as of 2/22/2022 10:37 AM      START taking these medications    Details   azithromycin (ZITHROMAX) 250 mg tablet Take 2 tablets today then 1 tablet daily x 4 days, Normal      predniSONE 20 mg tablet Take 2 tablets (40 mg total) by mouth daily for 4 days, Starting Tue 2/22/2022, Until Sat 2/26/2022, Normal         CONTINUE these medications which have NOT CHANGED    Details   Advair -21 MCG/ACT inhaler Inhale 2 puffs 2 (two) times a day, Starting Sun 1/31/2021, Historical Med      albuterol (VENTOLIN HFA) 90 mcg/act inhaler Inhale 2 puffs every 4 (four) hours as needed for wheezing, Starting Tue 10/2/2018, Normal      AYR SALINE NASAL NO-DRIP NA 1 spray into each nostril daily at bedtime, Historical Med      cetirizine (ZyrTEC) 10 mg tablet Take 10 mg by mouth daily  , Starting Fri 2/27/2015, Historical Med      famotidine (PEPCID) 20 mg tablet Take 1 tablet (20 mg total) by mouth 2 (two) times a day, Starting Thu 7/15/2021, Normal      fluticasone (FLONASE) 50 mcg/act nasal spray 2 sprays into each nostril daily, Starting Tue 10/2/2018, Normal      levothyroxine 125 mcg tablet 1 pill FIVE (5) days a week, No Print      mirtazapine (REMERON) 15 mg tablet Take 1 tablet (15 mg total) by mouth daily at bedtime, Starting Wed 1/5/2022, Until Fri 2/4/2022, Normal Multiple Vitamin (multivitamin) tablet Take 1 tablet by mouth daily, Historical Med      tiotropium (SPIRIVA RESPIMAT) 1 25 MCG/ACT AERS inhaler Inhale 2 puffs daily as needed , Historical Med      vitamin B-12 (CYANOCOBALAMIN) 500 MCG TABS Take 1 tablet (500 mcg total) by mouth daily, Starting Tue 5/18/2021, No Print             No discharge procedures on file      PDMP Review       Value Time User    PDMP Reviewed  Yes 3/25/2021 10:11 AM Elenita Eddy DO          ED Provider  Electronically Signed by           Shelly Burdick MD  02/22/22 6332

## 2022-02-22 NOTE — Clinical Note
Gonzalo Rogers was seen and treated in our emergency department on 2/22/2022  Diagnosis: Bronchitis    Wilda    She may return on this date:     COVID test performed today  Results should be available in 24 hours  If positive needs 7-10 day quarantine depending on symptoms  If negative, may return to work as long as no fever  If you have any questions or concerns, please don't hesitate to call        Maryann Arreaga MD    ______________________________           _______________          _______________  Hospital Representative                              Date                                Time
warm

## 2022-02-23 LAB
FLUAV RNA RESP QL NAA+PROBE: NEGATIVE
FLUBV RNA RESP QL NAA+PROBE: NEGATIVE
SARS-COV-2 RNA RESP QL NAA+PROBE: NEGATIVE

## 2022-05-03 ENCOUNTER — APPOINTMENT (OUTPATIENT)
Dept: LAB | Facility: MEDICAL CENTER | Age: 57
End: 2022-05-03
Payer: COMMERCIAL

## 2022-05-03 ENCOUNTER — OFFICE VISIT (OUTPATIENT)
Dept: FAMILY MEDICINE CLINIC | Facility: CLINIC | Age: 57
End: 2022-05-03
Payer: COMMERCIAL

## 2022-05-03 VITALS
BODY MASS INDEX: 19.04 KG/M2 | TEMPERATURE: 97.8 F | HEART RATE: 61 BPM | OXYGEN SATURATION: 97 % | WEIGHT: 97 LBS | HEIGHT: 60 IN | DIASTOLIC BLOOD PRESSURE: 76 MMHG | SYSTOLIC BLOOD PRESSURE: 138 MMHG

## 2022-05-03 DIAGNOSIS — J45.40 MODERATE PERSISTENT ASTHMA WITHOUT COMPLICATION: ICD-10-CM

## 2022-05-03 DIAGNOSIS — Z86.2 HISTORY OF ITP: ICD-10-CM

## 2022-05-03 DIAGNOSIS — Z90.81 H/O SPLENECTOMY: ICD-10-CM

## 2022-05-03 DIAGNOSIS — Z00.00 ENCOUNTER FOR ANNUAL PHYSICAL EXAM: Primary | ICD-10-CM

## 2022-05-03 DIAGNOSIS — E03.9 ACQUIRED HYPOTHYROIDISM: ICD-10-CM

## 2022-05-03 DIAGNOSIS — Q89.01 ASPLENIA: ICD-10-CM

## 2022-05-03 DIAGNOSIS — R63.4 WEIGHT LOSS, ABNORMAL: ICD-10-CM

## 2022-05-03 DIAGNOSIS — Z12.31 SCREENING MAMMOGRAM, ENCOUNTER FOR: ICD-10-CM

## 2022-05-03 DIAGNOSIS — E78.00 HYPERCHOLESTEROLEMIA: ICD-10-CM

## 2022-05-03 LAB
ALBUMIN SERPL BCP-MCNC: 4 G/DL (ref 3.5–5)
ALP SERPL-CCNC: 60 U/L (ref 46–116)
ALT SERPL W P-5'-P-CCNC: 21 U/L (ref 12–78)
ANION GAP SERPL CALCULATED.3IONS-SCNC: 2 MMOL/L (ref 4–13)
AST SERPL W P-5'-P-CCNC: 16 U/L (ref 5–45)
BACTERIA UR QL AUTO: ABNORMAL /HPF
BILIRUB SERPL-MCNC: 0.74 MG/DL (ref 0.2–1)
BILIRUB UR QL STRIP: NEGATIVE
BUN SERPL-MCNC: 14 MG/DL (ref 5–25)
CALCIUM SERPL-MCNC: 9.2 MG/DL (ref 8.3–10.1)
CHLORIDE SERPL-SCNC: 109 MMOL/L (ref 100–108)
CLARITY UR: CLEAR
CO2 SERPL-SCNC: 30 MMOL/L (ref 21–32)
COLOR UR: ABNORMAL
CREAT SERPL-MCNC: 0.78 MG/DL (ref 0.6–1.3)
GFR SERPL CREATININE-BSD FRML MDRD: 84 ML/MIN/1.73SQ M
GLUCOSE SERPL-MCNC: 101 MG/DL (ref 65–140)
GLUCOSE UR STRIP-MCNC: NEGATIVE MG/DL
HGB UR QL STRIP.AUTO: NEGATIVE
KETONES UR STRIP-MCNC: ABNORMAL MG/DL
LEUKOCYTE ESTERASE UR QL STRIP: NEGATIVE
MUCOUS THREADS UR QL AUTO: ABNORMAL
NITRITE UR QL STRIP: NEGATIVE
NON-SQ EPI CELLS URNS QL MICRO: ABNORMAL /HPF
PH UR STRIP.AUTO: 6.5 [PH]
POTASSIUM SERPL-SCNC: 3.5 MMOL/L (ref 3.5–5.3)
PROT SERPL-MCNC: 6.9 G/DL (ref 6.4–8.2)
PROT UR STRIP-MCNC: ABNORMAL MG/DL
RBC #/AREA URNS AUTO: ABNORMAL /HPF
SODIUM SERPL-SCNC: 141 MMOL/L (ref 136–145)
SP GR UR STRIP.AUTO: 1.02 (ref 1–1.03)
TSH SERPL DL<=0.05 MIU/L-ACNC: 4.75 UIU/ML (ref 0.45–4.5)
UROBILINOGEN UR STRIP-ACNC: <2 MG/DL
WBC #/AREA URNS AUTO: ABNORMAL /HPF

## 2022-05-03 PROCEDURE — 80053 COMPREHEN METABOLIC PANEL: CPT | Performed by: FAMILY MEDICINE

## 2022-05-03 PROCEDURE — 84443 ASSAY THYROID STIM HORMONE: CPT

## 2022-05-03 PROCEDURE — 36415 COLL VENOUS BLD VENIPUNCTURE: CPT

## 2022-05-03 PROCEDURE — 81001 URINALYSIS AUTO W/SCOPE: CPT | Performed by: FAMILY MEDICINE

## 2022-05-03 PROCEDURE — 99396 PREV VISIT EST AGE 40-64: CPT | Performed by: FAMILY MEDICINE

## 2022-05-03 NOTE — PROGRESS NOTES
FAMILY PRACTICE OFFICE VISIT  [facility]  [address]  [address], [place], [facility]      NAME: Lyle Liao  AGE: 60 y o  SEX: female  : 1965   MRN: [id number]    DATE: 5/3/2022  TIME: 12:33 PM    Assessment and Plan     Problem List Items Addressed This Visit     Asplenia    Moderate persistent asthma without complication    Acquired hypothyroidism    History of ITP    Hypercholesterolemia    H/O splenectomy ( ITP)    Weight loss, abnormal    Relevant Orders    Comprehensive metabolic panel    Urinalysis with microscopic      Other Visit Diagnoses     Encounter for annual physical exam    -  Primary    Relevant Orders    Ambulatory Referral to Gynecology    Screening mammogram, encounter for        Relevant Orders    Mammo screening bilateral w 3d & cad          Patient Instructions     Reviewed health history along with medication, her weight today is down about 1 lb verses previous, weight has been relatively stable the past 6 months but still very significant decline verses prior years  She is currently using levothyroxine 125  5 days a week, she did go for another TSH today, await results  Her last TSH in November was 0 148  I also added CMP to today's blood work  She had seen endocrinologist in the past regarding labile TSH  A1c back in July was okay at 5 6  She did re-evaluate with GI, they felt she was doing okay and did not plan to redo EGD, she did have gastric erosions on EGD back in 2021, colonoscopy had shown polyp, adenoma  Colonoscopy should be redone in 2024  She has no current GI complaints, is off Pepcid, if she does note any symptoms we should redo EGD  She will continue on B12 along with multivitamin  She stopped mirtazapine, did not note much benefit  She does continue to see allergist  -respiratory status is stable    She quit smoking about 5 years ago, smoked 1/2 pack per day for about 30 years  We did review other previous blood work,   She is up to date with Lipid screening  In 2020 cholesterol 207 with HDL 50,   Plan redo lipids next 6 to 12 months  She is up to date with Diabetes screening  Immunization History   Administered Date(s) Administered    COVID-19 MODERNA VACC 0 5 ML IM 04/16/2021, 05/17/2021    Hib (PRP-OMP) 02/23/2011    Influenza Quadrivalent, 6-35 Months IM 11/12/2015, 10/10/2017    Influenza, recombinant, quadrivalent,injectable, preservative free 10/02/2018    Meningococcal MCV4P 09/08/2015    Meningococcal, Unknown Serogroups 09/08/2015, 09/12/2017    Pneumococcal Polysaccharide PPV23 02/23/2011, 08/30/2021    Td (adult), adsorbed 09/12/2017    Tetanus, adsorbed 12/28/1998    Tuberculin Skin Test-PPD Intradermal 08/30/2021    Zoster 06/01/2017, 10/10/2017      History splenectomy, history of ITP  Immunizations up-to-date  She does do yearly Flu shot  Tdap/tetanus shot is up to date  (done every 10 yrs for superficial cuts, every 5 yrs for deep wounds)  She did receive Shingrix  Covid vaccine received x2, she should do booster  Did do pneumococcal vaccine last year    Her last Pap test was 3 5 years ago, had done with our office, she will see gyn to establish care   Discussed screening Mammogram, this is  ordered  Last mammogram was okay in November of 2020  Regarding Hepatitis C Screening - she will not do Hepatitis C screen  previously perfomed and was negative  Continue to try to watch healthy diet, exercise routinely  She could see nutritionist for consultation, monitor calorie counts at home, she will check if her insurance covers nutritionist    We will see her again in 3 months, sooner as needed             Chief Complaint     Chief Complaint   Patient presents with    Physical Exam       History of Present Illness   Rey Mejia is a 60y o -year-old female who is in today for a regular physical   We have been seeing her regarding weight loss, she did see GI again back in November, they felt no further tests were warranted  Her weight has been remained relatively steady these past 6 months  She did go for another TSH this morning, relates is using medication 5 times week    She stopped Remeron, noted no benefit with that  Was seen at the emergency room in February, use prednisone, chest x-ray was clear  Has had no stomach complaints, is off Pepcid      Review of Systems   Review of Systems   Constitutional: Positive for fatigue (Stable)  Negative for appetite change (She feels she is getting enough calories but does not do calorie counting) and fever  HENT: Negative for sore throat and trouble swallowing  Respiratory: Negative for cough, chest tightness, shortness of breath and wheezing  Cardiovascular: Negative for chest pain, palpitations and leg swelling  Gastrointestinal: Negative for abdominal pain, blood in stool, nausea and vomiting  No acid reflux     No change in bowel   Genitourinary: Negative for dysuria and hematuria  Neurological: Negative for dizziness, syncope, light-headedness and headaches  Psychiatric/Behavioral: Negative for behavioral problems and confusion         Active Problem List     Patient Active Problem List   Diagnosis    Allergic rhinitis    Asplenia    Moderate persistent asthma without complication    Acquired hypothyroidism    Leiomyoma of uterus    History of ITP    Former smoker    Family history of breast cancer    Dense breast tissue    Cyst of skin of right breast    Hypercholesterolemia    H/O splenectomy ( ITP)    Weight loss, abnormal       Past Medical History:  Reviewed    Past Surgical History:  Reviewed    Family History:  Reviewed    Social History:  Reviewed    Objective     Vitals:    05/03/22 1037   BP: 138/76   BP Location: Left arm   Patient Position: Sitting   Cuff Size: Standard   Pulse: 61   Temp: 97 8 °F (36 6 °C)   SpO2: 97%   Weight: 44 kg (97 lb)   Height: 5' (1 524 m)     Body mass index is 18 94 kg/m²  BP Readings from Last 3 Encounters:   05/03/22 138/76   02/22/22 160/82   11/17/21 161/83       Wt Readings from Last 3 Encounters:   05/03/22 44 kg (97 lb)   02/22/22 45 9 kg (101 lb 2 7 oz)   11/17/21 44 6 kg (98 lb 6 4 oz)       Physical Exam  Constitutional:       Appearance: She is well-developed  Comments: Thin 60year-old sitting on table, appears healthy, no acute distress   Eyes:      General: No scleral icterus  Neck:      Vascular: No carotid bruit  Cardiovascular:      Rate and Rhythm: Normal rate and regular rhythm  Heart sounds: Normal heart sounds  No murmur heard  Pulmonary:      Effort: Pulmonary effort is normal  No respiratory distress  Breath sounds: Normal breath sounds  No wheezing, rhonchi or rales  Abdominal:      Palpations: Abdomen is soft  Tenderness: There is no abdominal tenderness  There is no guarding  Musculoskeletal:      Right lower leg: No edema  Left lower leg: No edema  Lymphadenopathy:      Cervical: No cervical adenopathy  Skin:     Coloration: Skin is not jaundiced  Neurological:      Mental Status: She is oriented to person, place, and time     Psychiatric:         Mood and Affect: Mood normal          Behavior: Behavior normal          ALLERGIES:  Allergies   Allergen Reactions    Ibuprofen Anaphylaxis, Edema and Hives    Grass Extracts [Gramineae Pollens]     Molds & Smuts     Ragwitek [Short Ragweed Pollen Ext]     Aspirin Hives       Current Medications     Current Outpatient Medications   Medication Sig Dispense Refill    Advair -21 MCG/ACT inhaler Inhale 2 puffs 2 (two) times a day      albuterol (VENTOLIN HFA) 90 mcg/act inhaler Inhale 2 puffs every 4 (four) hours as needed for wheezing 1 Inhaler 0    AYR SALINE NASAL NO-DRIP NA 1 spray into each nostril daily at bedtime      cetirizine (ZyrTEC) 10 mg tablet Take 10 mg by mouth daily        fluticasone (FLONASE) 50 mcg/act nasal spray 2 sprays into each nostril daily 16 g 11    levothyroxine 125 mcg tablet 1 pill FIVE (5) days a week 30 tablet 3    Multiple Vitamin (multivitamin) tablet Take 1 tablet by mouth daily      vitamin B-12 (CYANOCOBALAMIN) 500 MCG TABS Take 1 tablet (500 mcg total) by mouth daily      famotidine (PEPCID) 20 mg tablet Take 1 tablet (20 mg total) by mouth 2 (two) times a day (Patient not taking: Reported on 8/30/2021) 60 tablet 3     No current facility-administered medications for this visit              Orders Placed This Encounter   Procedures    Mammo screening bilateral w 3d & cad    Comprehensive metabolic panel    Urinalysis with microscopic    Ambulatory Referral to Gynecology         Doni Terrell DO

## 2022-05-03 NOTE — PATIENT INSTRUCTIONS
Reviewed health history along with medication, her weight today is down about 1 lb verses previous, weight has been relatively stable the past 6 months but still very significant decline verses prior years  She is currently using levothyroxine 125  5 days a week, she did go for another TSH today, await results  Her last TSH in November was 0 148  I also added CMP to today's blood work  She had seen endocrinologist in the past regarding labile TSH  A1c back in July was okay at 5 6  She did re-evaluate with GI, they felt she was doing okay and did not plan to redo EGD, she did have gastric erosions on EGD back in June of 2021, colonoscopy had shown polyp, adenoma  Colonoscopy should be redone in June of 2024  She has no current GI complaints, is off Pepcid, if she does note any symptoms we should redo EGD  She will continue on B12 along with multivitamin  She stopped mirtazapine, did not note much benefit  She does continue to see allergist  -respiratory status is stable  She quit smoking about 5 years ago, smoked 1/2 pack per day for about 30 years  We did review other previous blood work,   She is up to date with Lipid screening  In 2020 cholesterol 207 with HDL 50,   Plan redo lipids next 6 to 12 months  She is up to date with Diabetes screening         Immunization History   Administered Date(s) Administered    COVID-19 MODERNA VACC 0 5 ML IM 04/16/2021, 05/17/2021    Hib (PRP-OMP) 02/23/2011    Influenza Quadrivalent, 6-35 Months IM 11/12/2015, 10/10/2017    Influenza, recombinant, quadrivalent,injectable, preservative free 10/02/2018    Meningococcal MCV4P 09/08/2015    Meningococcal, Unknown Serogroups 09/08/2015, 09/12/2017    Pneumococcal Polysaccharide PPV23 02/23/2011, 08/30/2021    Td (adult), adsorbed 09/12/2017    Tetanus, adsorbed 12/28/1998    Tuberculin Skin Test-PPD Intradermal 08/30/2021    Zoster 06/01/2017, 10/10/2017      History splenectomy, history of ITP   Immunizations up-to-date  She does do yearly Flu shot  Tdap/tetanus shot is up to date  (done every 10 yrs for superficial cuts, every 5 yrs for deep wounds)  She did receive Shingrix  Covid vaccine received x2, she should do booster  Did do pneumococcal vaccine last year    Her last Pap test was 3 5 years ago, had done with our office, she will see gyn to establish care   Discussed screening Mammogram, this is  ordered  Last mammogram was okay in November of 2020  Regarding Hepatitis C Screening - she will not do Hepatitis C screen  previously perfomed and was negative  Continue to try to watch healthy diet, exercise routinely  She could see nutritionist for consultation, monitor calorie counts at home, she will check if her insurance covers nutritionist    We will see her again in 3 months, sooner as needed

## 2022-05-04 ENCOUNTER — TELEPHONE (OUTPATIENT)
Dept: OTHER | Facility: OTHER | Age: 57
End: 2022-05-04

## 2022-05-06 ENCOUNTER — OFFICE VISIT (OUTPATIENT)
Dept: GASTROENTEROLOGY | Facility: MEDICAL CENTER | Age: 57
End: 2022-05-06
Payer: COMMERCIAL

## 2022-05-06 VITALS
BODY MASS INDEX: 18.63 KG/M2 | DIASTOLIC BLOOD PRESSURE: 71 MMHG | SYSTOLIC BLOOD PRESSURE: 126 MMHG | WEIGHT: 95.4 LBS | TEMPERATURE: 98.6 F | HEART RATE: 78 BPM

## 2022-05-06 DIAGNOSIS — R63.4 WEIGHT LOSS: Primary | ICD-10-CM

## 2022-05-06 PROCEDURE — 99213 OFFICE O/P EST LOW 20 MIN: CPT | Performed by: PHYSICIAN ASSISTANT

## 2022-05-06 NOTE — PROGRESS NOTES
[facility] - Outpatient Follow-up Note  Jefferson Pace 60 y o  female MRN: [id number]  Encounter: [id number]          ASSESSMENT AND PLAN:      1  Weight loss: she was referred back to us again for weight loss  She admits that her baseline weight was 130, she is now 95 lbs and continues to lose weight  She had egd and colonoscopy 7/2021 for this same reason  egd showed gastric inflammation and colonoscopy with several polyps removed  She had CT a/p about 1 year ago that was normal  Recent CXR negative  Repeat EGD and colonoscopy would likely be low yield  We could consider repeating CT scan and referral to nutrition services   - Ambulatory Referral to Nutrition Services; Future  - CT abdomen pelvis w contrast; Future  -f/u 3 months     ______________________________________________________________________    SUBJECTIVE:  Jefferson Pace is here for follow-up of weight loss  She was last seen in the office in November for this same complaint  She states that her family doctor referred her back to us for a 2nd check  She was originally seen for weight loss  She admits that her baseline weight is around 130 lb  She currently weighs 95 lb and states that she continues to lose weight  She denies any change in her eating habits, appetite and denies any other GI symptoms including acid reflux, dysphagia, abdominal pain, change in bowel habits, melena or hematochezia  She had egd and colonoscopy 7/2021 for this same reason  egd showed gastric inflammation and colonoscopy with several polyps removed  She had CT a/p about 1 year ago that was normal  Recent CXR negative  REVIEW OF SYSTEMS IS OTHERWISE NEGATIVE        Historical Information   Past Medical History:   Diagnosis Date    Accidental fall     LAST ASSESSED: [id number]    Asthma     Cellulitis of left thigh     LAST ASSESSED: [id number]    Colon polyp     Dermatitis     LAST ASSESSED: [id number]    Disease of thyroid gland     History of ITP     LAST ASSESSED: [id number]    Ingrown nail     RESOLVED: [id number]    Leg pain     Night sweats     Personal history of ovarian cyst     Pneumonia     LAST ASSESSED: [id number]    Right cervical radiculopathy     LAST ASSESSED: [id number]    Shortness of breath     Weight decreasing     unintentional     Past Surgical History:   Procedure Laterality Date    COLONOSCOPY      SPLENECTOMY  1998    TUBAL LIGATION       Social History   Social History     Substance and Sexual Activity   Alcohol Use No     Social History     Substance and Sexual Activity   Drug Use No     Social History     Tobacco Use   Smoking Status Former Smoker    Quit date: 2014    Years since quittin 3   Smokeless Tobacco Never Used   Tobacco Comment    QUIT AROUND AGE 46     Family History   Problem Relation Age of Onset    Breast cancer Sister 50        MALIGNANT NEOPLASM    Diabetes Family     Breast cancer Maternal Aunt 61    Stomach cancer Maternal Uncle 61    Skin cancer Maternal Uncle         age [de-identified]   Pretty Frankel No Known Problems Daughter     No Known Problems Paternal Aunt     Hypertension Mother     Heart attack Mother        Meds/Allergies       Current Outpatient Medications:     Advair -21 MCG/ACT inhaler    albuterol (VENTOLIN HFA) 90 mcg/act inhaler    AYR SALINE NASAL NO-DRIP NA    cetirizine (ZyrTEC) 10 mg tablet    fluticasone (FLONASE) 50 mcg/act nasal spray    levothyroxine 125 mcg tablet    Multiple Vitamin (multivitamin) tablet    vitamin B-12 (CYANOCOBALAMIN) 500 MCG TABS    famotidine (PEPCID) 20 mg tablet    Allergies   Allergen Reactions    Ibuprofen Anaphylaxis, Edema and Hives    Grass Extracts [Gramineae Pollens]     Molds & Smuts     Ragwitek [Short Ragweed Pollen Ext]     Aspirin Hives           Objective     Blood pressure 126/71, pulse 78, temperature 98 6 °F (37 °C), weight 43 3 kg (95 lb 6 4 oz)  Body mass index is 18 63 kg/m²        PHYSICAL EXAM: General Appearance:   Alert, cooperative, no distress   HEENT:   Normocephalic, atraumatic, anicteric      Neck:  Supple, symmetrical, trachea midline   Lungs:   Clear to auscultation bilaterally; no rales, rhonchi or wheezing; respirations unlabored    Heart[de-identified]   Regular rate and rhythm; no murmur, rub, or gallop  Abdomen:   Soft, non-tender, non-distended; normal bowel sounds; no masses, no organomegaly    Genitalia:   Deferred    Rectal:   Deferred    Extremities:  No cyanosis, clubbing or edema    Pulses:  2+ and symmetric    Skin:  No jaundice, rashes, or lesions    Lymph nodes:  No palpable cervical lymphadenopathy        Lab Results:   No visits with results within 1 Day(s) from this visit  Latest known visit with results is:   Office Visit on 05/03/2022   Component Date Value    Sodium 05/03/2022 141     Potassium 05/03/2022 3 5     Chloride 05/03/2022 109*    CO2 05/03/2022 30     ANION GAP 05/03/2022 2*    BUN 05/03/2022 14     Creatinine 05/03/2022 0 78     Glucose 05/03/2022 101     Calcium 05/03/2022 9 2     AST 05/03/2022 16     ALT 05/03/2022 21     Alkaline Phosphatase 05/03/2022 60     Total Protein 05/03/2022 6 9     Albumin 05/03/2022 4 0     Total Bilirubin 05/03/2022 0 74     eGFR 05/03/2022 84     Color, UA 05/03/2022 Light Yellow     Clarity, UA 05/03/2022 Clear     Specific Gravity, UA 05/03/2022 1 022     pH, UA 05/03/2022 6 5     Leukocytes, UA 05/03/2022 Negative     Nitrite, UA 05/03/2022 Negative     Protein, UA 05/03/2022 Trace*    Glucose, UA 05/03/2022 Negative     Ketones, UA 05/03/2022 20 (1+)*    Urobilinogen, UA 05/03/2022 <2 0     Bilirubin, UA 05/03/2022 Negative     Blood, UA 05/03/2022 Negative     RBC, UA 05/03/2022 1-2     WBC, UA 05/03/2022 1-2     Epithelial Cells 05/03/2022 Occasional     Bacteria, UA 05/03/2022 None Seen     MUCUS THREADS 05/03/2022 Moderate*         Radiology Results:   No results found

## 2022-05-21 DIAGNOSIS — E03.9 ACQUIRED HYPOTHYROIDISM: ICD-10-CM

## 2022-05-21 RX ORDER — LEVOTHYROXINE SODIUM 0.12 MG/1
TABLET ORAL
Qty: 24 TABLET | Refills: 4 | Status: SHIPPED | OUTPATIENT
Start: 2022-05-21

## 2022-06-03 NOTE — ANESTHESIA POSTPROCEDURE EVALUATION
Post-Op Assessment Note    No complications documented      BP      Temp      Pulse     Resp      SpO2 Son declines to repeat an x-ray of her chest, as there would be no invasive intervention that she would tolerate.  Her symptoms of the chest pain are all resolved.  Her right lung sounds a little bit dull at the base

## 2022-06-13 ENCOUNTER — VBI (OUTPATIENT)
Dept: ADMINISTRATIVE | Facility: OTHER | Age: 57
End: 2022-06-13

## 2022-06-16 ENCOUNTER — OFFICE VISIT (OUTPATIENT)
Dept: OBGYN CLINIC | Facility: CLINIC | Age: 57
End: 2022-06-16

## 2022-06-16 VITALS
SYSTOLIC BLOOD PRESSURE: 166 MMHG | WEIGHT: 98 LBS | HEART RATE: 61 BPM | DIASTOLIC BLOOD PRESSURE: 85 MMHG | BODY MASS INDEX: 19.14 KG/M2

## 2022-06-16 DIAGNOSIS — Z01.419 ENCOUNTER FOR GYNECOLOGICAL EXAMINATION WITHOUT ABNORMAL FINDING: Primary | ICD-10-CM

## 2022-06-16 DIAGNOSIS — Z12.4 SCREENING FOR CERVICAL CANCER: ICD-10-CM

## 2022-06-16 DIAGNOSIS — Z11.3 SCREEN FOR STD (SEXUALLY TRANSMITTED DISEASE): ICD-10-CM

## 2022-06-16 DIAGNOSIS — Z12.39 ENCOUNTER FOR BREAST CANCER SCREENING USING NON-MAMMOGRAM MODALITY: ICD-10-CM

## 2022-06-16 PROBLEM — Z86.2 HISTORY OF ITP: Status: RESOLVED | Noted: 2018-03-10 | Resolved: 2022-06-16

## 2022-06-16 PROBLEM — E78.00 HYPERCHOLESTEROLEMIA: Status: RESOLVED | Noted: 2019-10-11 | Resolved: 2022-06-16

## 2022-06-16 PROBLEM — Q89.01 ASPLENIA: Status: RESOLVED | Noted: 2017-09-12 | Resolved: 2022-06-16

## 2022-06-16 PROBLEM — Z90.81 H/O SPLENECTOMY: Status: RESOLVED | Noted: 2020-03-24 | Resolved: 2022-06-16

## 2022-06-16 PROBLEM — R92.2 DENSE BREAST TISSUE: Status: RESOLVED | Noted: 2019-02-05 | Resolved: 2022-06-16

## 2022-06-16 PROBLEM — R92.30 DENSE BREAST TISSUE: Status: RESOLVED | Noted: 2019-02-05 | Resolved: 2022-06-16

## 2022-06-16 PROBLEM — Z80.3 FAMILY HISTORY OF BREAST CANCER: Status: RESOLVED | Noted: 2018-12-07 | Resolved: 2022-06-16

## 2022-06-16 PROBLEM — Z87.891 FORMER SMOKER: Status: RESOLVED | Noted: 2018-10-02 | Resolved: 2022-06-16

## 2022-06-16 PROBLEM — R63.4 WEIGHT LOSS, ABNORMAL: Status: RESOLVED | Noted: 2021-07-15 | Resolved: 2022-06-16

## 2022-06-16 PROBLEM — N60.81 CYST OF SKIN OF RIGHT BREAST: Status: RESOLVED | Noted: 2019-02-05 | Resolved: 2022-06-16

## 2022-06-16 PROCEDURE — G0145 SCR C/V CYTO,THINLAYER,RESCR: HCPCS | Performed by: NURSE PRACTITIONER

## 2022-06-16 PROCEDURE — G0476 HPV COMBO ASSAY CA SCREEN: HCPCS | Performed by: NURSE PRACTITIONER

## 2022-06-16 PROCEDURE — 87591 N.GONORRHOEAE DNA AMP PROB: CPT | Performed by: NURSE PRACTITIONER

## 2022-06-16 PROCEDURE — 87491 CHLMYD TRACH DNA AMP PROBE: CPT | Performed by: NURSE PRACTITIONER

## 2022-06-16 PROCEDURE — 99396 PREV VISIT EST AGE 40-64: CPT | Performed by: NURSE PRACTITIONER

## 2022-06-16 RX ORDER — IBUPROFEN 600 MG/1
TABLET ORAL
COMMUNITY
Start: 2022-05-03 | End: 2022-06-16

## 2022-06-16 NOTE — LETTER
2022    To Rey Mejia  : 1965      This letter is to advise you that your recent CULTURE for gonorrhea and chlamydia results were reviewed by me and are NORMAL  Please contact the office for an appointment if you have any additional concerns      CHAR Velazco

## 2022-06-16 NOTE — LETTER
2022    To Darin CONRAD: 1965      This letter is to advise you that your recent CULTURE for gonorrhea and chlamydia results were reviewed by me and are NORMAL  Please contact the office for an appointment if you have any additional concerns      CHRA Guevara

## 2022-06-16 NOTE — LETTER
2022    To Solomon Capone  : 1965      This letter is to advise you that your recent PAP SMEAR results were reviewed by me and are NORMAL    We will see you in 1 year for your annual exam     Daryll Osgood, CRNP

## 2022-06-16 NOTE — PATIENT INSTRUCTIONS
Thank you for your confidence in our team    We appreciate you and welcome your feedback  If you receive a survey from us, please take a few moments to let us know how we are doing     Sincerely,  CHAR Enamorado

## 2022-06-16 NOTE — PROGRESS NOTES
ANNUAL GYNECOLOGICAL EXAMINATION    Nubia Bay is a 59 y o  female who presents today for annual GYN exam   Her last pap smear was performed 2018 and result was NILM  She reports no history of abnormal pap smears in her past  Her last mammogram was performed 2020 and result was WNL  She had colon cancer screening performed 2021  She reports menses as absent since   Her general medical history has been reviewed and she reports it as follows:    Past Medical History:   Diagnosis Date    Asthma     Colon polyp     Fibroid     Hypothyroidism     Idiopathic thrombocytopenic purpura (ITP) ([facility] [address] ) 1998    req'd splenectomy     Past Surgical History:   Procedure Laterality Date    SPLENECTOMY      TONSILECTOMY AND ADNOIDECTOMY  1970    TUBAL LIGATION Bilateral      OB History        3    Para   2    Term   2       0    AB   1    Living   2       SAB   1    IAB   0    Ectopic   0    Multiple   0    Live Births   2           Obstetric Comments                Social History     Tobacco Use    Smoking status: Former Smoker     Quit date:      Years since quittin 4    Smokeless tobacco: Never Used   Vaping Use    Vaping Use: Never used   Substance Use Topics    Alcohol use: Never    Drug use: Never     Social History     Substance and Sexual Activity   Sexual Activity Yes    Partners: Male    Birth control/protection: Female Sterilization, Post-menopausal     Cancer-related family history includes Breast cancer (age of onset: 50) in her sister; Breast cancer (age of onset: 61) in her maternal aunt; Cancer in her maternal uncle and maternal uncle  There is no history of Colon cancer or Ovarian cancer      Current Outpatient Medications   Medication Instructions    Advair -21 MCG/ACT inhaler 2 puffs, Inhalation, 2 times daily    albuterol (VENTOLIN HFA) 90 mcg/act inhaler 2 puffs, Inhalation, Every 4 hours PRN    AYR SALINE NASAL NO-DRIP NA 1 spray, Nasal, Daily at bedtime    cetirizine (ZYRTEC) 10 mg, Oral, Daily    fluticasone (FLONASE) 50 mcg/act nasal spray 2 sprays, Nasal, Daily    levothyroxine 125 mcg tablet USE 1 PILL 6 TIMES WEEKLY ON EMPTY STOMACH    Multiple Vitamin (multivitamin) tablet 1 tablet, Oral, Daily    vitamin B-12 (CYANOCOBALAMIN) 500 mcg, Oral, Daily       Review of Systems:  Review of Systems   Constitutional: Negative  Gastrointestinal: Negative  Genitourinary: Negative for difficulty urinating, pelvic pain, vaginal bleeding and vaginal discharge  Skin: Negative  Physical Exam:  /85   Pulse 61   Wt 44 5 kg (98 lb)   BMI 19 14 kg/m²   Physical Exam  Constitutional:       General: She is not in acute distress  Appearance: She is well-developed  Genitourinary:      Vulva normal       No lesions in the vagina  Right Adnexa: not tender and no mass present  Left Adnexa: not tender and no mass present  No cervical motion tenderness or lesion  Uterus is not tender  Breasts:      Right: No mass, nipple discharge, skin change or tenderness  Left: No mass, nipple discharge, skin change or tenderness  Neck:      Thyroid: No thyromegaly  Cardiovascular:      Rate and Rhythm: Normal rate and regular rhythm  Pulmonary:      Effort: Pulmonary effort is normal    Chest:       Abdominal:      Palpations: Abdomen is soft  Tenderness: There is no abdominal tenderness  Musculoskeletal:      Cervical back: Neck supple  Neurological:      Mental Status: She is alert and oriented to person, place, and time  Skin:     General: Skin is warm and dry  Vitals reviewed  Assessment/Plan:   1  Normal well-woman GYN exam   2  Cervical cancer screening:  Normal cervical exam   Pap smear done with HPV co-testing  3  STD screening:  Orders placed for vaginal GC/CT cultures    Declines screening with serum anti-HIV, anti-HCV, HbsAg, RPR    4  Breast cancer screening:  Normal breast exam   Continue with bilateral screening mammogram as already scheduled for 6/27/2022 per PCP  Reviewed breast self-awareness  5  Colon cancer screening:  Up to date  6  Depression Screening: Patient's depression screening was assessed with a PHQ-2 score of 0  Their PHQ-9 score was   Clinically patient does not have depression  No treatment is required  7  BMI Counseling: Body mass index is 19 14 kg/m²  No intervention indicated  8  Open comedone on R breast:  Extraction of debris performed without issue     9  Return to office in 1 year for annual GYN exam

## 2022-06-17 LAB
HPV HR 12 DNA CVX QL NAA+PROBE: NEGATIVE
HPV16 DNA CVX QL NAA+PROBE: NEGATIVE
HPV18 DNA CVX QL NAA+PROBE: NEGATIVE

## 2022-06-20 LAB
C TRACH DNA SPEC QL NAA+PROBE: NEGATIVE
N GONORRHOEA DNA SPEC QL NAA+PROBE: NEGATIVE

## 2022-06-21 LAB
LAB AP GYN PRIMARY INTERPRETATION: NORMAL
Lab: NORMAL

## 2022-06-27 ENCOUNTER — HOSPITAL ENCOUNTER (OUTPATIENT)
Dept: MAMMOGRAPHY | Facility: CLINIC | Age: 57
Discharge: HOME | End: 2022-06-27
Payer: COMMERCIAL

## 2022-06-27 VITALS — BODY MASS INDEX: 19.24 KG/M2 | WEIGHT: 98 LBS | HEIGHT: 60 IN

## 2022-06-27 DIAGNOSIS — Z12.31 SCREENING MAMMOGRAM, ENCOUNTER FOR: ICD-10-CM

## 2022-06-27 PROCEDURE — 77063 BREAST TOMOSYNTHESIS BI: CPT

## 2022-06-27 PROCEDURE — 77067 SCR MAMMO BI INCL CAD: CPT

## 2022-07-20 ENCOUNTER — APPOINTMENT (OUTPATIENT)
Dept: LAB | Facility: HOSPITAL | Age: 57
End: 2022-07-20
Payer: COMMERCIAL

## 2022-07-20 DIAGNOSIS — R94.31 NONSPECIFIC ABNORMAL ELECTROCARDIOGRAM (ECG) (EKG): ICD-10-CM

## 2022-07-20 DIAGNOSIS — E78.5 HYPERLIPIDEMIA, UNSPECIFIED HYPERLIPIDEMIA TYPE: ICD-10-CM

## 2022-07-20 LAB
CHOLEST SERPL-MCNC: 199 MG/DL
HDLC SERPL-MCNC: 76 MG/DL
LDLC SERPL CALC-MCNC: 111 MG/DL (ref 0–100)
NONHDLC SERPL-MCNC: 123 MG/DL
NT-PROBNP SERPL-MCNC: 86 PG/ML
TRIGL SERPL-MCNC: 59 MG/DL

## 2022-07-20 PROCEDURE — 80061 LIPID PANEL: CPT

## 2022-07-20 PROCEDURE — 83880 ASSAY OF NATRIURETIC PEPTIDE: CPT

## 2022-07-20 PROCEDURE — 36415 COLL VENOUS BLD VENIPUNCTURE: CPT

## 2022-08-03 PROBLEM — E03.9 HYPOTHYROIDISM: Status: ACTIVE | Noted: 2022-08-03

## 2022-08-03 PROBLEM — E03.9 HYPOTHYROIDISM: Status: RESOLVED | Noted: 2022-08-03 | Resolved: 2022-08-03

## 2022-08-03 PROBLEM — J45.909 ASTHMA: Status: ACTIVE | Noted: 2022-08-03

## 2022-10-03 ENCOUNTER — HOSPITAL ENCOUNTER (EMERGENCY)
Facility: HOSPITAL | Age: 57
Discharge: HOME | End: 2022-10-03
Attending: EMERGENCY MEDICINE
Payer: COMMERCIAL

## 2022-10-03 VITALS
RESPIRATION RATE: 20 BRPM | SYSTOLIC BLOOD PRESSURE: 131 MMHG | OXYGEN SATURATION: 99 % | BODY MASS INDEX: 19.12 KG/M2 | TEMPERATURE: 98.1 F | WEIGHT: 97.88 LBS | DIASTOLIC BLOOD PRESSURE: 76 MMHG | HEART RATE: 64 BPM

## 2022-10-03 DIAGNOSIS — U07.1 COVID-19: Primary | ICD-10-CM

## 2022-10-03 DIAGNOSIS — R11.0 NAUSEA: ICD-10-CM

## 2022-10-03 LAB
ALBUMIN SERPL BCP-MCNC: 3.6 G/DL (ref 3.5–5)
ALP SERPL-CCNC: 52 U/L (ref 46–116)
ALT SERPL W P-5'-P-CCNC: 21 U/L (ref 12–78)
ANION GAP SERPL CALCULATED.3IONS-SCNC: 9 MMOL/L (ref 4–13)
AST SERPL W P-5'-P-CCNC: 20 U/L (ref 5–45)
BACTERIA UR QL AUTO: ABNORMAL /HPF
BASOPHILS # BLD AUTO: 0.01 THOUSANDS/ΜL (ref 0–0.1)
BASOPHILS NFR BLD AUTO: 0 % (ref 0–1)
BILIRUB DIRECT SERPL-MCNC: 0.08 MG/DL (ref 0–0.2)
BILIRUB SERPL-MCNC: 0.36 MG/DL (ref 0.2–1)
BILIRUB UR QL STRIP: ABNORMAL
BUN SERPL-MCNC: 14 MG/DL (ref 5–25)
CALCIUM SERPL-MCNC: 9.4 MG/DL (ref 8.3–10.1)
CHLORIDE SERPL-SCNC: 103 MMOL/L (ref 96–108)
CLARITY UR: ABNORMAL
CO2 SERPL-SCNC: 25 MMOL/L (ref 21–32)
COLOR UR: YELLOW
CREAT SERPL-MCNC: 0.85 MG/DL (ref 0.6–1.3)
EOSINOPHIL # BLD AUTO: 0 THOUSAND/ΜL (ref 0–0.61)
EOSINOPHIL NFR BLD AUTO: 0 % (ref 0–6)
ERYTHROCYTE [DISTWIDTH] IN BLOOD BY AUTOMATED COUNT: 13.9 % (ref 11.6–15.1)
GFR SERPL CREATININE-BSD FRML MDRD: 75 ML/MIN/1.73SQ M
GLUCOSE SERPL-MCNC: 106 MG/DL (ref 65–140)
GLUCOSE UR STRIP-MCNC: NEGATIVE MG/DL
HCT VFR BLD AUTO: 43.6 % (ref 34.8–46.1)
HGB BLD-MCNC: 15.3 G/DL (ref 11.5–15.4)
HGB UR QL STRIP.AUTO: ABNORMAL
IMM GRANULOCYTES # BLD AUTO: 0.01 THOUSAND/UL (ref 0–0.2)
IMM GRANULOCYTES NFR BLD AUTO: 0 % (ref 0–2)
KETONES UR STRIP-MCNC: ABNORMAL MG/DL
LEUKOCYTE ESTERASE UR QL STRIP: NEGATIVE
LIPASE SERPL-CCNC: 79 U/L (ref 73–393)
LYMPHOCYTES # BLD AUTO: 1.08 THOUSANDS/ΜL (ref 0.6–4.47)
LYMPHOCYTES NFR BLD AUTO: 32 % (ref 14–44)
MAGNESIUM SERPL-MCNC: 2 MG/DL (ref 1.6–2.6)
MCH RBC QN AUTO: 31.9 PG (ref 26.8–34.3)
MCHC RBC AUTO-ENTMCNC: 35.1 G/DL (ref 31.4–37.4)
MCV RBC AUTO: 91 FL (ref 82–98)
MONOCYTES # BLD AUTO: 0.82 THOUSAND/ΜL (ref 0.17–1.22)
MONOCYTES NFR BLD AUTO: 24 % (ref 4–12)
MUCOUS THREADS UR QL AUTO: ABNORMAL
NEUTROPHILS # BLD AUTO: 1.44 THOUSANDS/ΜL (ref 1.85–7.62)
NEUTS SEG NFR BLD AUTO: 44 % (ref 43–75)
NITRITE UR QL STRIP: NEGATIVE
NON-SQ EPI CELLS URNS QL MICRO: ABNORMAL /HPF
NRBC BLD AUTO-RTO: 0 /100 WBCS
PH UR STRIP.AUTO: 5.5 [PH] (ref 4.5–8)
PLATELET # BLD AUTO: 231 THOUSANDS/UL (ref 149–390)
PMV BLD AUTO: 11.5 FL (ref 8.9–12.7)
POTASSIUM SERPL-SCNC: 3.5 MMOL/L (ref 3.5–5.3)
PROT SERPL-MCNC: 7.1 G/DL (ref 6.4–8.4)
PROT UR STRIP-MCNC: ABNORMAL MG/DL
RBC # BLD AUTO: 4.79 MILLION/UL (ref 3.81–5.12)
RBC #/AREA URNS AUTO: ABNORMAL /HPF
SARS-COV-2 RNA RESP QL NAA+PROBE: POSITIVE
SODIUM SERPL-SCNC: 137 MMOL/L (ref 135–147)
SP GR UR STRIP.AUTO: >=1.03 (ref 1–1.03)
UROBILINOGEN UR QL STRIP.AUTO: 0.2 E.U./DL
WBC # BLD AUTO: 3.36 THOUSAND/UL (ref 4.31–10.16)
WBC #/AREA URNS AUTO: ABNORMAL /HPF

## 2022-10-03 PROCEDURE — U0005 INFEC AGEN DETEC AMPLI PROBE: HCPCS | Performed by: EMERGENCY MEDICINE

## 2022-10-03 PROCEDURE — 80076 HEPATIC FUNCTION PANEL: CPT | Performed by: EMERGENCY MEDICINE

## 2022-10-03 PROCEDURE — 80048 BASIC METABOLIC PNL TOTAL CA: CPT | Performed by: EMERGENCY MEDICINE

## 2022-10-03 PROCEDURE — 99284 EMERGENCY DEPT VISIT MOD MDM: CPT

## 2022-10-03 PROCEDURE — 96361 HYDRATE IV INFUSION ADD-ON: CPT

## 2022-10-03 PROCEDURE — 83690 ASSAY OF LIPASE: CPT | Performed by: EMERGENCY MEDICINE

## 2022-10-03 PROCEDURE — 36415 COLL VENOUS BLD VENIPUNCTURE: CPT | Performed by: EMERGENCY MEDICINE

## 2022-10-03 PROCEDURE — 99284 EMERGENCY DEPT VISIT MOD MDM: CPT | Performed by: EMERGENCY MEDICINE

## 2022-10-03 PROCEDURE — U0003 INFECTIOUS AGENT DETECTION BY NUCLEIC ACID (DNA OR RNA); SEVERE ACUTE RESPIRATORY SYNDROME CORONAVIRUS 2 (SARS-COV-2) (CORONAVIRUS DISEASE [COVID-19]), AMPLIFIED PROBE TECHNIQUE, MAKING USE OF HIGH THROUGHPUT TECHNOLOGIES AS DESCRIBED BY CMS-2020-01-R: HCPCS | Performed by: EMERGENCY MEDICINE

## 2022-10-03 PROCEDURE — 81001 URINALYSIS AUTO W/SCOPE: CPT

## 2022-10-03 PROCEDURE — 85025 COMPLETE CBC W/AUTO DIFF WBC: CPT | Performed by: EMERGENCY MEDICINE

## 2022-10-03 PROCEDURE — 96374 THER/PROPH/DIAG INJ IV PUSH: CPT

## 2022-10-03 PROCEDURE — 83735 ASSAY OF MAGNESIUM: CPT | Performed by: EMERGENCY MEDICINE

## 2022-10-03 RX ORDER — ONDANSETRON 4 MG/1
4 TABLET, ORALLY DISINTEGRATING ORAL EVERY 6 HOURS PRN
Qty: 20 TABLET | Refills: 0 | Status: SHIPPED | OUTPATIENT
Start: 2022-10-03

## 2022-10-03 RX ORDER — ONDANSETRON 2 MG/ML
4 INJECTION INTRAMUSCULAR; INTRAVENOUS ONCE
Status: COMPLETED | OUTPATIENT
Start: 2022-10-03 | End: 2022-10-03

## 2022-10-03 RX ADMIN — ONDANSETRON 4 MG: 2 INJECTION INTRAMUSCULAR; INTRAVENOUS at 10:14

## 2022-10-03 RX ADMIN — SODIUM CHLORIDE 1000 ML: 0.9 INJECTION, SOLUTION INTRAVENOUS at 10:17

## 2022-10-03 NOTE — Clinical Note
Rigoberto Shoemaker was seen and treated in our emergency department on 10/3/2022  No restrictions            Diagnosis: COVID 23    Lyndsay Louis  may return to work on return date  She may return on this date: 10/05/2022    Ms Chung Vaughn may return to work on 10/5/2022 if her symptoms have improved, otherwise she is to continue quarantine until improvement in symptoms and no coughing, she does not require further testing  If you have any questions or concerns, please don't hesitate to call        Casey Jones MD    ______________________________           _______________          _______________  Hospital Representative                              Date                                Time

## 2022-10-03 NOTE — ED PROVIDER NOTES
History  Chief Complaint   Patient presents with    Diarrhea     Diarrhea, abdominal pain, cough, congestion, body aches and pain with coughing beginning Friday  Tylenol taken at 0700     58 YO female presents with URI symptoms and diarrhea  Pt states symptoms have been present for the last 3 days  She has had congestion, runny nose, coughing, sore throat, fatigue and bodyaches  Additionally notes an upset stomach with frequent loose stool and nausea  Pt denies known sick contacts, she has been vaccinated against [place]  Pt took acetaminophen prior to arrival  Pt denies CP/SOB/F/C, no dysuria, burning on urination or blood in urine  History provided by:  Patient and significant other   used: No    URI  Presenting symptoms: congestion, cough, fatigue, rhinorrhea and sore throat    Presenting symptoms: no fever    Severity:  Moderate  Onset quality:  Gradual  Duration:  3 days  Timing:  Constant  Progression:  Unchanged  Chronicity:  New  Relieved by:  Nothing  Worsened by:  Nothing  Associated symptoms: myalgias    Associated symptoms: no arthralgias        Prior to Admission Medications   Prescriptions Last Dose Informant Patient Reported? Taking?    AYR SALINE NASAL NO-DRIP NA  Self Yes No   Si spray into each nostril daily at bedtime   Advair -21 MCG/ACT inhaler   Yes No   Sig: Inhale 2 puffs 2 (two) times a day   Multiple Vitamin (multivitamin) tablet   Yes No   Sig: Take 1 tablet by mouth daily   albuterol (2 5 mg/3 mL) 0 083 % nebulizer solution   Yes No   albuterol (VENTOLIN HFA) 90 mcg/act inhaler  Self No No   Sig: Inhale 2 puffs every 4 (four) hours as needed for wheezing   cetirizine (ZyrTEC) 10 mg tablet  Self Yes No   Sig: Take 10 mg by mouth daily     fluticasone (FLONASE) 50 mcg/act nasal spray  Self No No   Si sprays into each nostril daily   levothyroxine 125 mcg tablet   No No   Sig: USE 1 PILL 6 TIMES WEEKLY ON EMPTY STOMACH   vitamin B-12 (CYANOCOBALAMIN) 500 MCG TABS   No No   Sig: Take 1 tablet (500 mcg total) by mouth daily      Facility-Administered Medications: None       Past Medical History:   Diagnosis Date    Asthma     Colon polyp     Fibroid     Hypothyroidism     Idiopathic thrombocytopenic purpura (ITP) ([facility] [address] ) 1998    req'd splenectomy       Past Surgical History:   Procedure Laterality Date    SPLENECTOMY  1998    TONSILECTOMY AND ADNOIDECTOMY  1970    TUBAL LIGATION Bilateral        Family History   Problem Relation Age of Onset    Hypertension Mother     Heart attack Mother     Breast cancer Sister 50    No Known Problems Daughter     No Known Problems Maternal Grandmother     No Known Problems Maternal Grandfather     No Known Problems Paternal Grandmother     No Known Problems Paternal Grandfather     Breast cancer Maternal Aunt 61    Cancer Maternal Uncle         melanoma    Cancer Maternal Uncle         stomach    No Known Problems Paternal Aunt     Diabetes Family     Colon cancer Neg Hx     Ovarian cancer Neg Hx      I have reviewed and agree with the history as documented  E-Cigarette/Vaping    E-Cigarette Use Never User      E-Cigarette/Vaping Substances     Social History     Tobacco Use    Smoking status: Former Smoker     Quit date:      Years since quittin 7    Smokeless tobacco: Never Used   Vaping Use    Vaping Use: Never used   Substance Use Topics    Alcohol use: Never    Drug use: Never       Review of Systems   Constitutional: Positive for fatigue  Negative for chills and fever  HENT: Positive for congestion, rhinorrhea and sore throat  Negative for dental problem  Eyes: Negative for visual disturbance  Respiratory: Positive for cough  Negative for shortness of breath  Cardiovascular: Negative for chest pain  Gastrointestinal: Positive for diarrhea and nausea  Negative for abdominal pain and vomiting  Genitourinary: Negative for dysuria and frequency     Musculoskeletal: Positive for myalgias  Negative for arthralgias  Skin: Negative for rash  Neurological: Negative for dizziness, weakness and light-headedness  Psychiatric/Behavioral: Negative for agitation, behavioral problems and confusion  All other systems reviewed and are negative  Physical Exam  Physical Exam  Vitals and nursing note reviewed  Constitutional:       Appearance: Normal appearance  HENT:      Head: Normocephalic and atraumatic  Eyes:      Extraocular Movements: Extraocular movements intact  Conjunctiva/sclera: Conjunctivae normal    Cardiovascular:      Rate and Rhythm: Normal rate  Pulmonary:      Effort: Pulmonary effort is normal    Abdominal:      General: There is no distension  Tenderness: There is no abdominal tenderness  Musculoskeletal:         General: Normal range of motion  Cervical back: Normal range of motion  Skin:     Findings: No rash  Neurological:      General: No focal deficit present  Mental Status: She is alert  Cranial Nerves: No cranial nerve deficit     Psychiatric:         Mood and Affect: Mood normal          Vital Signs  ED Triage Vitals   Temperature Pulse Respirations Blood Pressure SpO2   10/03/22 0926 10/03/22 0928 10/03/22 0928 10/03/22 0928 10/03/22 0928   98 1 °F (36 7 °C) 64 20 131/76 99 %      Temp Source Heart Rate Source Patient Position - Orthostatic VS BP Location FiO2 (%)   10/03/22 0926 10/03/22 0928 10/03/22 0928 10/03/22 0928 --   Oral Monitor Sitting Right arm       Pain Score       --                  Vitals:    10/03/22 0928   BP: 131/76   Pulse: 64   Patient Position - Orthostatic VS: Sitting         Visual Acuity      ED Medications  Medications   sodium chloride 0 9 % bolus 1,000 mL (0 mL Intravenous Stopped 10/3/22 1117)   ondansetron (ZOFRAN) injection 4 mg (4 mg Intravenous Given 10/3/22 1014)       Diagnostic Studies  Results Reviewed     Procedure Component Value Units Date/Time    Urine Microscopic [[id number]]  (Abnormal) Collected: 10/03/22 1058    Lab Status: Final result Specimen: Urine, Clean Catch Updated: 10/03/22 1301     RBC, UA 1-2 /hpf      WBC, UA 1-2 /hpf      Epithelial Cells Innumerable /hpf      Bacteria, UA Moderate /hpf      MUCUS THREADS Moderate    COVID only [[id number]]  (Abnormal) Collected: 10/03/22 1018    Lab Status: Final result Specimen: Nares from Nose Updated: 10/03/22 1123     SARS-CoV-2 Positive    Narrative:      FOR PEDIATRIC PATIENTS - copy/paste COVID Guidelines URL to browser: [email or web address]    SARS-CoV-2 assay is a Nucleic Acid Amplification assay intended for the  qualitative detection of nucleic acid from SARS-CoV-2 in nasopharyngeal  swabs  Results are for the presumptive identification of SARS-CoV-2 RNA  Positive results are indicative of infection with SARS-CoV-2, the virus  causing COVID-19, but do not rule out bacterial infection or co-infection  with other viruses  Laboratories within the [place] and its  territories are required to report all positive results to the appropriate  public health authorities  Negative results do not preclude SARS-CoV-2  infection and should not be used as the sole basis for treatment or other  patient management decisions  Negative results must be combined with  clinical observations, patient history, and epidemiological information  This test has not been FDA cleared or approved  This test has been authorized by FDA under an Emergency Use Authorization  (EUA)  This test is only authorized for the duration of time the  declaration that circumstances exist justifying the authorization of the  emergency use of an in vitro diagnostic tests for detection of SARS-CoV-2  virus and/or diagnosis of COVID-19 infection under section 564(b)(1) of  the Act, 21 U  S C  844TTP-1(K)(3), unless the authorization is terminated  or revoked sooner   The test has been validated but independent review by FDA  and CLIA is pending  Test performed using [employer] GeneXpert: This RT-PCR assay targets N2,  a region unique to SARS-CoV-2  A conserved region in the E-gene was chosen  for pan-Sarbecovirus detection which includes SARS-CoV-2  According to CMS-2020-01-R, this platform meets the definition of high-throughput technology      Urine Macroscopic, POC [[id number]]  (Abnormal) Collected: 10/03/22 1058    Lab Status: Final result Specimen: Urine Updated: 10/03/22 1059     Color, UA Yellow     Clarity, UA Cloudy     pH, UA 5 5     Leukocytes, UA Negative     Nitrite, UA Negative     Protein, UA 30 (1+) mg/dl      Glucose, UA Negative mg/dl      Ketones, UA 15 (1+) mg/dl      Urobilinogen, UA 0 2 E U /dl      Bilirubin, UA Small     Occult Blood, UA Small     Specific [place], UA >=1 030    Narrative:      CLINITEK RESULT    Basic metabolic panel [[id number]] Collected: 10/03/22 1018    Lab Status: Final result Specimen: Blood from Arm, Right Updated: 10/03/22 1049     Sodium 137 mmol/L      Potassium 3 5 mmol/L      Chloride 103 mmol/L      CO2 25 mmol/L      ANION GAP 9 mmol/L      BUN 14 mg/dL      Creatinine 0 85 mg/dL      Glucose 106 mg/dL      Calcium 9 4 mg/dL      eGFR 75 ml/min/1 73sq m     Narrative:      National Kidney Disease Foundation guidelines for Chronic Kidney Disease (CKD):     Stage 1 with normal or high GFR (GFR > 90 mL/min/1 73 square meters)    Stage 2 Mild CKD (GFR = 60-89 mL/min/1 73 square meters)    Stage 3A Moderate CKD (GFR = 45-59 mL/min/1 73 square meters)    Stage 3B Moderate CKD (GFR = 30-44 mL/min/1 73 square meters)    Stage 4 Severe CKD (GFR = 15-29 mL/min/1 73 square meters)    Stage 5 End Stage CKD (GFR <15 mL/min/1 73 square meters)  Note: GFR calculation is accurate only with a steady state creatinine    Hepatic function panel [[id number]]  (Normal) Collected: 10/03/22 1018    Lab Status: Final result Specimen: Blood from Arm, Right Updated: 10/03/22 1049     Total Bilirubin 0 36 mg/dL      Bilirubin, Direct 0 08 mg/dL      Alkaline Phosphatase 52 U/L      AST 20 U/L      ALT 21 U/L      Total Protein 7 1 g/dL      Albumin 3 6 g/dL     Magnesium [[id number]]  (Normal) Collected: 10/03/22 1018    Lab Status: Final result Specimen: Blood from Arm, Right Updated: 10/03/22 1049     Magnesium 2 0 mg/dL     Lipase [[id number]]  (Normal) Collected: 10/03/22 1018    Lab Status: Final result Specimen: Blood from Arm, Right Updated: 10/03/22 1049     Lipase 79 u/L     CBC and differential [[id number]]  (Abnormal) Collected: 10/03/22 1018    Lab Status: Final result Specimen: Blood from Arm, Right Updated: 10/03/22 1027     WBC 3 36 Thousand/uL      RBC 4 79 Million/uL      Hemoglobin 15 3 g/dL      Hematocrit 43 6 %      MCV 91 fL      MCH 31 9 pg      MCHC 35 1 g/dL      RDW 13 9 %      MPV 11 5 fL      Platelets 753 Thousands/uL      nRBC 0 /100 WBCs      Neutrophils Relative 44 %      Immat GRANS % 0 %      Lymphocytes Relative 32 %      Monocytes Relative 24 %      Eosinophils Relative 0 %      Basophils Relative 0 %      Neutrophils Absolute 1 44 Thousands/µL      Immature Grans Absolute 0 01 Thousand/uL      Lymphocytes Absolute 1 08 Thousands/µL      Monocytes Absolute 0 82 Thousand/µL      Eosinophils Absolute 0 00 Thousand/µL      Basophils Absolute 0 01 Thousands/µL                  No orders to display              Procedures  Procedures         ED Course                                             MDM  Number of Diagnoses or Management Options  COVID-19: new and requires workup  Nausea: new and requires workup  Diagnosis management comments: 1  Flu-like symptoms - Pt with URI symptoms and nausea, diarrhea  Will check COVID, CBC for leukocytosis, metabolic panel for electrolyte abnormalities and dehydration,  LFT's to assess GB dysfunction, lipase for pancreatitis, will give fluids, antiemetics         Amount and/or Complexity of Data Reviewed  Clinical lab tests: ordered and reviewed  Obtain history from someone other than the patient: yes  Independent visualization of images, tracings, or specimens: yes    Patient Progress  Patient progress: stable      Disposition  Final diagnoses:   COVID-19   Nausea     Time reflects when diagnosis was documented in both MDM as applicable and the Disposition within this note     Time User Action Codes Description Comment    10/3/2022 11:25 AM Maya Starcher E Add [U07 1] COVID-19     10/3/2022 11:26 AM Maya Starcher E Add [R11 0] Nausea       ED Disposition     ED Disposition   Discharge    Condition   Stable    Date/Time   Mon Oct 3, 2022 11:25 AM    Comment   Jason Maynard discharge to home/self care                 Follow-up Information     Follow up With Specialties Details Why Contact Info    Dontae Levy DO Family Medicine   [address]  [address] [place] [address]  [phone]            Discharge Medication List as of 10/3/2022 11:38 AM      START taking these medications    Details   ondansetron (ZOFRAN-ODT) 4 mg disintegrating tablet Take 1 tablet (4 mg total) by mouth every 6 (six) hours as needed for nausea, Starting Mon 10/3/2022, Normal         CONTINUE these medications which have NOT CHANGED    Details   Advair -21 MCG/ACT inhaler Inhale 2 puffs 2 (two) times a day, Starting Sun 1/31/2021, Historical Med      albuterol (2 5 mg/3 mL) 0 083 % nebulizer solution Starting Wed 6/22/2022, Historical Med      albuterol (VENTOLIN HFA) 90 mcg/act inhaler Inhale 2 puffs every 4 (four) hours as needed for wheezing, Starting Tue 10/2/2018, Normal      AYR SALINE NASAL NO-DRIP NA 1 spray into each nostril daily at bedtime, Historical Med      cetirizine (ZyrTEC) 10 mg tablet Take 10 mg by mouth daily  , Starting Fri 2/27/2015, Historical Med      fluticasone (FLONASE) 50 mcg/act nasal spray 2 sprays into each nostril daily, Starting Tue 10/2/2018, Normal      levothyroxine 125 mcg tablet USE 1 PILL 6 TIMES WEEKLY ON EMPTY STOMACH, Normal      Multiple Vitamin (multivitamin) tablet Take 1 tablet by mouth daily, Historical Med      vitamin B-12 (CYANOCOBALAMIN) 500 MCG TABS Take 1 tablet (500 mcg total) by mouth daily, Starting Tue 5/18/2021, No Print             No discharge procedures on file      PDMP Review       Value Time User    PDMP Reviewed  Yes 3/25/2021 10:11 AM Nithin Brown DO          ED Provider  Electronically Signed by           Jonathan Loredo MD  10/05/22 0857

## 2022-10-03 NOTE — DISCHARGE INSTRUCTIONS
Take acetaminophen and ibuprofen for aches and fevers  Return to the ER if you develop shortness of breath  Take the zofran as needed for nausea, this can be placed under the tongue to dissolve if you are vomiting  You can return to work on Wednesday if your symptoms have improved

## 2022-11-03 ENCOUNTER — TELEPHONE (OUTPATIENT)
Dept: PAIN MEDICINE | Facility: MEDICAL CENTER | Age: 57
End: 2022-11-03

## 2022-11-03 NOTE — TELEPHONE ENCOUNTER
Caller: Patient    Doctor:     Reason for call: Missed a call, unsure who called   Not a pain manage patient    Call back#:

## 2022-11-21 DIAGNOSIS — E03.9 ACQUIRED HYPOTHYROIDISM: ICD-10-CM

## 2022-11-21 RX ORDER — LEVOTHYROXINE SODIUM 0.12 MG/1
TABLET ORAL
Qty: 24 TABLET | Refills: 4 | Status: SHIPPED | OUTPATIENT
Start: 2022-11-21

## 2023-03-12 DIAGNOSIS — E03.9 ACQUIRED HYPOTHYROIDISM: ICD-10-CM

## 2023-03-12 RX ORDER — LEVOTHYROXINE SODIUM 0.12 MG/1
TABLET ORAL
Qty: 78 TABLET | Refills: 1 | Status: SHIPPED | OUTPATIENT
Start: 2023-03-12

## 2023-03-20 ENCOUNTER — HOSPITAL ENCOUNTER (EMERGENCY)
Facility: HOSPITAL | Age: 58
Discharge: HOME | End: 2023-03-20
Attending: EMERGENCY MEDICINE

## 2023-03-20 ENCOUNTER — APPOINTMENT (EMERGENCY)
Dept: RADIOLOGY | Facility: HOSPITAL | Age: 58
End: 2023-03-20

## 2023-03-20 VITALS
SYSTOLIC BLOOD PRESSURE: 168 MMHG | TEMPERATURE: 98.3 F | RESPIRATION RATE: 18 BRPM | OXYGEN SATURATION: 97 % | DIASTOLIC BLOOD PRESSURE: 82 MMHG | HEART RATE: 60 BPM
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
## 2023-03-20 DIAGNOSIS — S52.123A RADIAL HEAD FRACTURE: ICD-10-CM

## 2023-03-20 RX ORDER — ACETAMINOPHEN 325 MG/1
650 TABLET ORAL ONCE
Status: COMPLETED | OUTPATIENT
Start: 2023-03-20 | End: 2023-03-20

## 2023-03-20 RX ADMIN — ACETAMINOPHEN 325MG 650 MG: 325 TABLET ORAL at 15:03

## 2023-03-20 NOTE — ED ATTENDING ATTESTATION
3/20/2023  IRita MD, saw and evaluated the patient  I have discussed the patient with the resident/non-physician practitioner and agree with the resident's/non-physician practitioner's findings, Plan of Care, and MDM as documented in the resident's/non-physician practitioner's note, except where noted  All available labs and Radiology studies were reviewed  I was present for key portions of any procedure(s) performed by the resident/non-physician practitioner and I was immediately available to provide assistance  At this point I agree with the current assessment done in the Emergency Department  I have conducted an independent evaluation of this patient a history and physical is as follows:    58 YO female presents after a fall onto the Left arm  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, 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, she fell with her Left arm flexed  She has had pain in the elbow, unable to range without use of alternate arm, pain in the wrist, some numbness and tingling  Gen: Pt is in NAD  HEENT: Head is atraumatic, EOM's intact, neck has FROM  Chest: CTAB, non-tender  Heart: RRR  Abdomen: Soft, NT/ND  Musculoskeletal: Decreased ROM and tenderness in the Left elbow, NV intact  Skin: No rash, no ecchymosis  Neuro: Awake, alert, oriented x4; Cranial nerves II-XII intact  Psych: Normal affect    MDM -  Patient with imaging showing a radial head fracture  Will have sugar tong splint placed, sling  Will give information regarding orthopedics follow up       ED Course         Critical Care Time  Procedures

## 2023-03-20 NOTE — Clinical Note
Zak Daniel was seen and treated in our emergency department on 3/20/2023  No work until cleared by Family Doctor/Orthopedics    limit left arm use    Diagnosis:     Wilda    She may return on this date: If you have any questions or concerns, please don't hesitate to call        Mike Lee MD    ______________________________           _______________          _______________  Hospital Representative                              Date                                Time

## 2023-03-20 NOTE — ED PROVIDER NOTES
History  Chief Complaint   Patient presents with   • Arm Injury     Was pushed today by a kid at school and landed on LUE  Reporting increased pain  Patient is a 64-year-old female who presents to the ER for pain in the left arm and elbow  She works in [employer] at a school and was knocked down by a student who was running  He landed on her left forearm  She had immediate pain and swelling around her left elbow  She is unable to move her arm without the assistance of her right arm  She denies hitting her head, loss consciousness or anticoagulant or antiplatelet use  No history of trauma or surgery to left arm  Prior to Admission Medications   Prescriptions Last Dose Informant Patient Reported? Taking?    AYR SALINE NASAL NO-DRIP NA   Yes No   Si spray into each nostril daily at bedtime   Advair -21 MCG/ACT inhaler   Yes No   Sig: Inhale 2 puffs 2 (two) times a day   Multiple Vitamin (multivitamin) tablet   Yes No   Sig: Take 1 tablet by mouth daily   albuterol (2 5 mg/3 mL) 0 083 % nebulizer solution   Yes No   albuterol (VENTOLIN HFA) 90 mcg/act inhaler   No No   Sig: Inhale 2 puffs every 4 (four) hours as needed for wheezing   cetirizine (ZyrTEC) 10 mg tablet   Yes No   Sig: Take 10 mg by mouth daily     fluticasone (FLONASE) 50 mcg/act nasal spray   No No   Si sprays into each nostril daily   levothyroxine 125 mcg tablet   No No   Sig: TAKE 1 TAB BY MOUTH 6 TIMES WEEKLY ON EMPTY STOMACH   ondansetron (ZOFRAN-ODT) 4 mg disintegrating tablet   No No   Sig: Take 1 tablet (4 mg total) by mouth every 6 (six) hours as needed for nausea   vitamin B-12 (CYANOCOBALAMIN) 500 MCG TABS   No No   Sig: Take 1 tablet (500 mcg total) by mouth daily      Facility-Administered Medications: None       Past Medical History:   Diagnosis Date   • Asthma    • Colon polyp    • Fibroid    • Hypothyroidism    • Idiopathic thrombocytopenic purpura (ITP) ([facility])     req'd splenectomy       Past Surgical History:   Procedure Laterality Date   • SPLENECTOMY     • TONSILECTOMY AND ADNOIDECTOMY     • TUBAL LIGATION Bilateral        Family History   Problem Relation Age of Onset   • Hypertension Mother    • Heart attack Mother    • Breast cancer Sister 50   • No Known Problems Daughter    • No Known Problems Maternal Grandmother    • No Known Problems Maternal Grandfather    • No Known Problems Paternal Grandmother    • No Known Problems Paternal Grandfather    • Breast cancer Maternal Aunt 61   • Cancer Maternal Uncle         melanoma   • Cancer Maternal Uncle         stomach   • No Known Problems Paternal Aunt    • Diabetes Family    • Colon cancer Neg Hx    • Ovarian cancer Neg Hx      I have reviewed and agree with the history as documented  E-Cigarette/Vaping   • E-Cigarette Use Never User      E-Cigarette/Vaping Substances     Social History     Tobacco Use   • Smoking status: Former     Types: Cigarettes     Quit date:      Years since quittin 2   • Smokeless tobacco: Never   Vaping Use   • Vaping Use: Never used   Substance Use Topics   • Alcohol use: Never   • Drug use: Never       Review of Systems   Respiratory: Negative for shortness of breath  Cardiovascular: Negative for chest pain  Musculoskeletal: Positive for arthralgias and joint swelling  Left arm pain  Skin: Negative for wound  Neurological: Negative for dizziness, syncope, light-headedness and headaches  Physical Exam  Physical Exam  Constitutional:       General: She is not in acute distress  Appearance: Normal appearance  HENT:      Head: Normocephalic and atraumatic  Eyes:      Extraocular Movements: Extraocular movements intact  Cardiovascular:      Rate and Rhythm: Normal rate and regular rhythm  Heart sounds: Normal heart sounds  No murmur heard  Pulmonary:      Effort: No respiratory distress  Breath sounds: Normal breath sounds     Musculoskeletal:      Right elbow: Normal       Left elbow: Swelling present  No deformity  Decreased range of motion  Tenderness present  Right forearm: Normal       Left forearm: Bony tenderness present  Right wrist: Normal       Left wrist: Normal       Right hand: Normal       Left hand: Normal       Comments: Left elbow pain with flexion, extension, supination and pronation  Skin:     General: Skin is warm and dry  Capillary Refill: Capillary refill takes less than 2 seconds  Neurological:      Mental Status: She is alert and oriented to person, place, and time  Psychiatric:         Mood and Affect: Mood normal          Behavior: Behavior normal          Vital Signs  ED Triage Vitals [03/20/23 1400]   Temperature Pulse Respirations Blood Pressure SpO2   98 3 °F (36 8 °C) 60 18 168/82 97 %      Temp src Heart Rate Source Patient Position - Orthostatic VS BP Location FiO2 (%)   -- Monitor Sitting Right arm --      Pain Score       9           Vitals:    03/20/23 1400   BP: 168/82   Pulse: 60   Patient Position - Orthostatic VS: Sitting         Visual Acuity      ED Medications  Medications   acetaminophen (TYLENOL) tablet 650 mg (650 mg Oral Given 3/20/23 1503)       Diagnostic Studies  Results Reviewed     None                 XR elbow 3+ vw LEFT    (Results Pending)   XR forearm 2 views LEFT    (Results Pending)              Procedures  Splint application    Date/Time: 3/20/2023 3:30 PM  Performed by: Alisa Serrano PA-C  Authorized by: Alisa Serrano PA-C   [place] Protocol:  Consent: Verbal consent obtained  Risks and benefits: risks, benefits and alternatives were discussed  Consent given by: patient  Time out: Immediately prior to procedure a "time out" was called to verify the correct patient, procedure, equipment, support staff and site/side marked as required    Timeout called at: 3/20/2023 3:30 PM   Patient understanding: patient states understanding of the procedure being performed  Patient consent: the patient's understanding of the procedure matches consent given  Procedure consent: procedure consent matches procedure scheduled  Relevant documents: relevant documents present and verified  Test results: test results available and properly labeled  Site marked: the operative site was marked  Radiology Images displayed and confirmed  If images not available, report reviewed: imaging studies available  Patient identity confirmed: verbally with patient      Pre-procedure details:     Sensation:  Normal  Procedure details:     Laterality:  Left    Location:  Elbow    Elbow:  L elbow    Splint type:  Sugar tong  Post-procedure details:     Pain:  Unchanged    Sensation:  Normal    Patient tolerance of procedure: Tolerated well, no immediate complications             ED Course             Medical Decision Making  Patient is a 61-year-old female who presents to the ER with left elbow pain  Pain with movement and unable to fully flex or extend with using other arm  X-ray with radial head fracture  Patient was placed in splint and sling  Patient given referral to orthopedic and should call tomorrow  Return precautions given  Amount and/or Complexity of Data Reviewed  Radiology: ordered  Risk  OTC drugs  Disposition  Final diagnoses:   Fall, initial encounter   Radial head fracture     Time reflects when diagnosis was documented in both MDM as applicable and the Disposition within this note     Time User Action Codes Description Comment    3/20/2023  3:53 PM Delroy Ambrosio Add [C64  RABM] Fall, initial encounter     3/20/2023  3:53 PM Delroy Ambrosio Add [Y47 971L] Radial head fracture       ED Disposition     ED Disposition   Discharge    Condition   Stable    Date/Time   Mon Mar 20, 2023  3:53 PM    Comment   Sisi Lynch discharge to home/self care                 Follow-up Information     Follow up With Specialties Details Why Contact Info Additional [address] [facility] Orthopedic Surgery Schedule an appointment as soon as possible for a visit in 1 week  [address]  [address] [facility] [address]  [address] [facility], [address], [address] [place], [place], [place], [address]   [address] [facility] Emergency Department Emergency Medicine Go to  If symptoms worsen [facility] [id number] [address] [facility] Emergency Department, [address]  , [place], [place], [address]          Discharge Medication List as of 3/20/2023  3:54 PM      CONTINUE these medications which have NOT CHANGED    Details   Advair -21 MCG/ACT inhaler Inhale 2 puffs 2 (two) times a day, Starting Sun 1/31/2021, Historical Med      albuterol (2 5 mg/3 mL) 0 083 % nebulizer solution Starting Wed 6/22/2022, Historical Med      albuterol (VENTOLIN HFA) 90 mcg/act inhaler Inhale 2 puffs every 4 (four) hours as needed for wheezing, Starting Tue 10/2/2018, Normal      AYR SALINE NASAL NO-DRIP NA 1 spray into each nostril daily at bedtime, Historical Med      cetirizine (ZyrTEC) 10 mg tablet Take 10 mg by mouth daily  , Starting Fri 2/27/2015, Historical Med      fluticasone (FLONASE) 50 mcg/act nasal spray 2 sprays into each nostril daily, Starting Tue 10/2/2018, Normal      levothyroxine 125 mcg tablet TAKE 1 TAB BY MOUTH 6 TIMES WEEKLY ON EMPTY STOMACH, Normal      Multiple Vitamin (multivitamin) tablet Take 1 tablet by mouth daily, Historical Med      ondansetron (ZOFRAN-ODT) 4 mg disintegrating tablet Take 1 tablet (4 mg total) by mouth every 6 (six) hours as needed for nausea, Starting Mon 10/3/2022, Normal      vitamin B-12 (CYANOCOBALAMIN) 500 MCG TABS Take 1 tablet (500 mcg total) by mouth daily, Starting Tue 5/18/2021, No Print             No discharge procedures on file      PDMP Review       Value Time User    PDMP Reviewed  Yes 3/25/2021 10:11 AM Joanie Sumner DO          ED Provider  Electronically Signed by           Partha Castellanos PA-C  03/20/23 4456

## 2023-03-20 NOTE — Clinical Note
Lowell Morris was seen and treated in our emergency department on 3/20/2023  No work until cleared by Family Doctor/Orthopedics    limit left arm use    Diagnosis:     Wilda    She may return on this date: If you have any questions or concerns, please don't hesitate to call        Kassie Kramer MD    ______________________________           _______________          _______________  Hospital Representative                              Date                                Time

## 2023-03-21 ENCOUNTER — APPOINTMENT (OUTPATIENT)
Dept: URGENT CARE | Facility: MEDICAL CENTER | Age: 58
End: 2023-03-21

## 2023-03-27 ENCOUNTER — APPOINTMENT (OUTPATIENT)
Dept: RADIOLOGY | Facility: MEDICAL CENTER | Age: 58
End: 2023-03-27

## 2023-03-27 ENCOUNTER — OFFICE VISIT (OUTPATIENT)
Dept: OBGYN CLINIC | Facility: MEDICAL CENTER | Age: 58
End: 2023-03-27

## 2023-03-27 VITALS
HEIGHT: 60 IN | SYSTOLIC BLOOD PRESSURE: 131 MMHG | WEIGHT: 97 LBS | DIASTOLIC BLOOD PRESSURE: 78 MMHG | BODY MASS INDEX: 19.04 KG/M2 | HEART RATE: 60 BPM

## 2023-03-27 DIAGNOSIS — M79.622 LEFT UPPER ARM PAIN: ICD-10-CM

## 2023-03-27 DIAGNOSIS — S52.125A CLOSED NONDISPLACED FRACTURE OF HEAD OF LEFT RADIUS, INITIAL ENCOUNTER: Primary | ICD-10-CM

## 2023-03-27 NOTE — PROGRESS NOTES
Orthopaedic Surgery - Office Note  Bill Oden (36 y o  female)   : 1965   MRN: [id number]  Encounter Date: 3/27/2023    Chief Complaint   Patient presents with   • Left Elbow - Pain       Assessment / Plan  Left elbow: radial head fracture    · Imaging was reviewed with patient in office today - results noted below  · Splint was removed today- discussed with patient that there is no needed for splint/cast for this fracture   · Patient can use sling as needed for protection  · Remain nonweightbearing with left upper extremity- no lifting, pushing, pulling  · Work on gentle range of motion of left elbow and left shoulder  · Referral to physical therapy was placed today to work on passive and active range of motion of left elbow and left shoulder  · Continue with OTC anti-inflammatories as needed for pain  · Repeat XR LEFT ELBOW at next visit  Return in about 4 weeks (around 2023) for w/ Dr Emperatriz Montes  History of Present Illness  Bill Oden is a 59 y o  female who presents for an evaluation of left elbow pain  Patient sustained an injury on 3/20/2023  She was at work, working in [employer] and she was knocked over by a student who was running and landed on her left forearm with elbow flexed  She was unable to move her elbow through range of motion due to pain in her left elbow  She presented to the ED the same day  She was placed in a sugar tong splint and sling, which she has been compliant with wear  She notes that two days after the fall she started experiencing pain over the proximal humerus  She states today the pain is located over the proximal humerus, she denies any elbow pain  Pain is described as burning and sharp  She notes swelling through her finger, but denies any numbness or tingling  She is taking tylenol for pain with no relief  Review of Systems  Pertinent items are noted in HPI  All other systems were reviewed and are negative      Physical Exam  /78   Pulse 60   Ht 5' (1 524 m)   Wt 44 kg (97 lb)   BMI 18 94 kg/m²   Cons: Appears well  No apparent distress  Psych: Alert  Oriented x3  Mood and affect normal   Eyes: PERRLA, EOMI  Resp: Normal effort  No audible wheezing or stridor  CV: Palpable pulse  No discernable arrhythmia  No LE edema  Lymph:  No palpable cervical, axillary, or inguinal lymphadenopathy  Skin: Warm  No palpable masses  No visible lesions  Neuro: Normal muscle tone  Normal and symmetric DTR's  Left Elbow Exam  Alignment:  Normal elbow alignment and carrying angle  Inspection:  mild swelling  No ecchymosis  Palpation:  radial head tenderness  ROM:  Elbow Extension 40  Elbow Flexion 120    Strength:  Able to actively extend and flex elbow with notes stiffness  Stability:  Not tested  Tests:  No pertinent positive or negative tests  Neurovascular:  Sensation intact in Ax/R/M/U nerve distributions  2+ radial pulse  Left Shoulder Exam  Alignment / Posture:  Normal shoulder posture  Inspection:  No swelling  Palpation:  proximal humerus tenderness  ROM:  Shoulder FE Active: 100 Pass: 120  Shoulder ER 20  Strength:  Rotator cuff +4/5  Elevation 4/5  Stability:  Not tested  Tests: No pertinent positive or negative tests  Neurovascular:  Sensation intact in Ax/R/M/U nerve distributions  2+ radial pulse  Studies Reviewed  I have personally reviewed pertinent films in PACS  XR of left elbow - intra-articular fracture of the radial head with mild displacement  XR of left humerus - no acute osseous abnormalities    Procedures  No procedures today  Medical, Surgical, Family, and Social History  The patient's medical history, family history, and social history, were reviewed and updated as appropriate      Past Medical History:   Diagnosis Date   • Asthma    • Colon polyp    • Fibroid    • Hypothyroidism    • Idiopathic thrombocytopenic purpura (ITP) ([facility] ) 1998    req'd splenectomy       Past Surgical History: Procedure Laterality Date   • SPLENECTOMY     • TONSILECTOMY AND ADNOIDECTOMY     • TUBAL LIGATION Bilateral        Family History   Problem Relation Age of Onset   • Hypertension Mother    • Heart attack Mother    • Breast cancer Sister 50   • No Known Problems Daughter    • No Known Problems Maternal Grandmother    • No Known Problems Maternal Grandfather    • No Known Problems Paternal Grandmother    • No Known Problems Paternal Grandfather    • Breast cancer Maternal Aunt 61   • Cancer Maternal Uncle         melanoma   • Cancer Maternal Uncle         stomach   • No Known Problems Paternal Aunt    • Diabetes Family    • Colon cancer Neg Hx    • Ovarian cancer Neg Hx        Social History     Occupational History   • Not on file   Tobacco Use   • Smoking status: Former     Types: Cigarettes     Quit date:      Years since quittin 2   • Smokeless tobacco: Never   Vaping Use   • Vaping Use: Never used   Substance and Sexual Activity   • Alcohol use: Never   • Drug use: Never   • Sexual activity: Yes     Partners: Male     Birth control/protection: Female Sterilization, Post-menopausal       Allergies   Allergen Reactions   • Ibuprofen Anaphylaxis, Edema and Hives   • Grass Extracts [Gramineae Pollens]    • Molds & Smuts    • Ragwitek [Short Ragweed Pollen Ext]    • Aspirin Hives         Current Outpatient Medications:   •  Advair -21 MCG/ACT inhaler, Inhale 2 puffs 2 (two) times a day, Disp: , Rfl:   •  albuterol (2 5 mg/3 mL) 0 083 % nebulizer solution, , Disp: , Rfl:   •  albuterol (VENTOLIN HFA) 90 mcg/act inhaler, Inhale 2 puffs every 4 (four) hours as needed for wheezing, Disp: 1 Inhaler, Rfl: 0  •  AYR SALINE NASAL NO-DRIP NA, 1 spray into each nostril daily at bedtime, Disp: , Rfl:   •  cetirizine (ZyrTEC) 10 mg tablet, Take 10 mg by mouth daily  , Disp: , Rfl:   •  fluticasone (FLONASE) 50 mcg/act nasal spray, 2 sprays into each nostril daily, Disp: 16 g, Rfl: 11  • levothyroxine 125 mcg tablet, TAKE 1 TAB BY MOUTH 6 TIMES WEEKLY ON EMPTY STOMACH, Disp: 78 tablet, Rfl: 1  •  Multiple Vitamin (multivitamin) tablet, Take 1 tablet by mouth daily, Disp: , Rfl:   •  vitamin B-12 (CYANOCOBALAMIN) 500 MCG TABS, Take 1 tablet (500 mcg total) by mouth daily, Disp:  , Rfl:   •  ondansetron (ZOFRAN-ODT) 4 mg disintegrating tablet, Take 1 tablet (4 mg total) by mouth every 6 (six) hours as needed for nausea (Patient not taking: Reported on 3/27/2023), Disp: 20 tablet, Rfl: 0      Liliane Ward    Scribe Attestation    I,:  Zuleyma Foster am acting as a scribe while in the presence of the attending physician :       I,:  Nico Maldonado MD personally performed the services described in this documentation    as scribed in my presence :

## 2023-04-13 DIAGNOSIS — S52.125A CLOSED NONDISPLACED FRACTURE OF HEAD OF LEFT RADIUS, INITIAL ENCOUNTER: Primary | ICD-10-CM

## 2023-04-24 ENCOUNTER — OFFICE VISIT (OUTPATIENT)
Dept: OBGYN CLINIC | Facility: MEDICAL CENTER | Age: 58
End: 2023-04-24

## 2023-04-24 ENCOUNTER — TELEPHONE (OUTPATIENT)
Dept: OBGYN CLINIC | Facility: HOSPITAL | Age: 58
End: 2023-04-24

## 2023-04-24 ENCOUNTER — APPOINTMENT (OUTPATIENT)
Dept: RADIOLOGY | Facility: MEDICAL CENTER | Age: 58
End: 2023-04-24

## 2023-04-24 VITALS
HEIGHT: 60 IN | BODY MASS INDEX: 19.04 KG/M2 | WEIGHT: 97 LBS | SYSTOLIC BLOOD PRESSURE: 148 MMHG | DIASTOLIC BLOOD PRESSURE: 82 MMHG | HEART RATE: 60 BPM

## 2023-04-24 DIAGNOSIS — S52.125A CLOSED NONDISPLACED FRACTURE OF HEAD OF LEFT RADIUS, INITIAL ENCOUNTER: Primary | ICD-10-CM

## 2023-04-24 DIAGNOSIS — M25.522 PAIN IN LEFT ELBOW: ICD-10-CM

## 2023-04-24 NOTE — TELEPHONE ENCOUNTER
I did contact them and notified them that she can start range of motion strengthening exercises as tolerated at this time

## 2023-04-24 NOTE — PROGRESS NOTES
Orthopaedic Surgery - Office Note  Omega Welch (29 y o  female)   : 1965   MRN: [id number]  Encounter Date: 2023    Chief Complaint   Patient presents with   • Left Elbow - Follow-up       Assessment / Plan  Left elbow: radial head fracture- appropriate healing    · Imaging was reviewed with patient today: results noted below  · Patient may begin weight bearing as tolerated with left upper extremity   · Continue with physical therapy- work on obtaining full range of motion   · Work note was provided to return to work on May 8th, 2023 with no restrictions  · Continue with OTC anti-inflammatories as needed for pain   Return in 6 weeks (on 2023) for w/ Dr Oxana Cheek  History of Present Illness  Omega Welch is a 59 y o  female who presents for a follow up of left radial head fracture DOI: 3/20/2023  Since last visit patient has been compliant with remain nonweightbearing of the left upper extremity  She has been working on gentle range of motion exercises for the elbow and shoulder  She also began physical therapy which she notes it going well- attending 3 times a week  Today she states she is doing well  She notes that at night time she get an achy pain through her biceps  She is not taking anything for pain  She denies any numbness or tingling  Review of Systems  Pertinent items are noted in HPI  All other systems were reviewed and are negative  Physical Exam  /82   Pulse 60   Ht 5' (1 524 m)   Wt 44 kg (97 lb)   BMI 18 94 kg/m²   Cons: Appears well  No apparent distress  Psych: Alert  Oriented x3  Mood and affect normal   Eyes: PERRLA, EOMI  Resp: Normal effort  No audible wheezing or stridor  CV: Palpable pulse  No discernable arrhythmia  No LE edema  Lymph:  No palpable cervical, axillary, or inguinal lymphadenopathy  Skin: Warm  No palpable masses  No visible lesions  Neuro: Normal muscle tone  Normal and symmetric DTR's       Left Elbow Exam  Alignment:  Normal elbow alignment and carrying angle  Inspection:  No swelling  No ecchymosis  Palpation:  No tenderness at the radial head  ROM:  Elbow Extension 10  Elbow Flexion 145  Supination: 80, Pronation 60   Strength:  Not tested  Stability:  Not tested  Tests:  No pertinent positive or negative tests  Neurovascular:  Sensation intact in Ax/R/M/U nerve distributions  2+ radial pulse  Studies Reviewed  I have personally reviewed pertinent films in PACS  XR of left elbow - evidence of healing from a radial head fracture    Procedures  No procedures today  Medical, Surgical, Family, and Social History  The patient's medical history, family history, and social history, were reviewed and updated as appropriate      Past Medical History:   Diagnosis Date   • Asthma    • Colon polyp    • Fibroid    • Hypothyroidism    • Idiopathic thrombocytopenic purpura (ITP) ([facility] )     req'd splenectomy       Past Surgical History:   Procedure Laterality Date   • SPLENECTOMY     • TONSILECTOMY AND ADNOIDECTOMY     • TUBAL LIGATION Bilateral        Family History   Problem Relation Age of Onset   • Hypertension Mother    • Heart attack Mother    • Breast cancer Sister 50   • No Known Problems Daughter    • No Known Problems Maternal Grandmother    • No Known Problems Maternal Grandfather    • No Known Problems Paternal Grandmother    • No Known Problems Paternal Grandfather    • Breast cancer Maternal Aunt 61   • Cancer Maternal Uncle         melanoma   • Cancer Maternal Uncle         stomach   • No Known Problems Paternal Aunt    • Diabetes Family    • Colon cancer Neg Hx    • Ovarian cancer Neg Hx        Social History     Occupational History   • Not on file   Tobacco Use   • Smoking status: Former     Types: Cigarettes     Quit date:      Years since quittin 3   • Smokeless tobacco: Never   Vaping Use   • Vaping Use: Never used   Substance and Sexual Activity   • Alcohol use: Never   • Drug use: Never   • Sexual activity: Yes     Partners: Male     Birth control/protection: Female Sterilization, Post-menopausal       Allergies   Allergen Reactions   • Ibuprofen Anaphylaxis, Edema and Hives   • Grass Extracts [Gramineae Pollens]    • Molds & Smuts    • Ragwitek [Short Ragweed Pollen Ext]    • Aspirin Hives         Current Outpatient Medications:   •  Advair -21 MCG/ACT inhaler, Inhale 2 puffs 2 (two) times a day, Disp: , Rfl:   •  albuterol (2 5 mg/3 mL) 0 083 % nebulizer solution, , Disp: , Rfl:   •  albuterol (VENTOLIN HFA) 90 mcg/act inhaler, Inhale 2 puffs every 4 (four) hours as needed for wheezing, Disp: 1 Inhaler, Rfl: 0  •  AYR SALINE NASAL NO-DRIP NA, 1 spray into each nostril daily at bedtime, Disp: , Rfl:   •  cetirizine (ZyrTEC) 10 mg tablet, Take 10 mg by mouth daily  , Disp: , Rfl:   •  fluticasone (FLONASE) 50 mcg/act nasal spray, 2 sprays into each nostril daily, Disp: 16 g, Rfl: 11  •  levothyroxine 125 mcg tablet, TAKE 1 TAB BY MOUTH 6 TIMES WEEKLY ON EMPTY STOMACH, Disp: 78 tablet, Rfl: 1  •  Multiple Vitamin (multivitamin) tablet, Take 1 tablet by mouth daily, Disp: , Rfl:   •  ondansetron (ZOFRAN-ODT) 4 mg disintegrating tablet, Take 1 tablet (4 mg total) by mouth every 6 (six) hours as needed for nausea (Patient not taking: Reported on 3/27/2023), Disp: 20 tablet, Rfl: 0  •  vitamin B-12 (CYANOCOBALAMIN) 500 MCG TABS, Take 1 tablet (500 mcg total) by mouth daily, Disp:  , Rfl:       Kymberly Lora    Scribe Attestation    I,:  Kymberly Lora am acting as a scribe while in the presence of the attending physician :       I,:  Dimas Haile MD personally performed the services described in this documentation    as scribed in my presence :

## 2023-04-24 NOTE — TELEPHONE ENCOUNTER
Caller: [facility]    Doctor: Jeannette Olivares    Reason for call: May OT begin strengthening exercises for the elbow? Please advise      Call back#: [phone]

## 2023-04-24 NOTE — LETTER
April 24, 2023     Patient: Javier Murdock  YOB: 1965  Date of Visit: 4/24/2023      To Whom it May Concern:    Javier Murdock is under my professional care  Lillian Crocker was seen in my office on 4/24/2023  Lillian Crocker may return to work on May 8th, 2023 with no restrictions  If you have any questions or concerns, please don't hesitate to call           Sincerely,          Marquis Casas MD        CC: No Recipients

## 2023-04-25 ENCOUNTER — TELEPHONE (OUTPATIENT)
Dept: OBGYN CLINIC | Facility: HOSPITAL | Age: 58
End: 2023-04-25

## 2023-04-25 NOTE — TELEPHONE ENCOUNTER
Caller: [facility]    Doctor/OfficeNico Kirk    CB#: [phone]      What needs to be faxed: AVS and work note    ATTN to: Rosario-claim # [id number]    Fax#: [phone]      Documents were successfully e-faxed

## 2023-06-05 ENCOUNTER — OFFICE VISIT (OUTPATIENT)
Dept: OBGYN CLINIC | Facility: MEDICAL CENTER | Age: 58
End: 2023-06-05
Payer: OTHER MISCELLANEOUS

## 2023-06-05 VITALS
HEART RATE: 56 BPM | HEIGHT: 60 IN | DIASTOLIC BLOOD PRESSURE: 77 MMHG | BODY MASS INDEX: 19.04 KG/M2 | SYSTOLIC BLOOD PRESSURE: 136 MMHG | WEIGHT: 97 LBS

## 2023-06-05 DIAGNOSIS — S52.125A CLOSED NONDISPLACED FRACTURE OF HEAD OF LEFT RADIUS, INITIAL ENCOUNTER: Primary | ICD-10-CM

## 2023-06-05 PROCEDURE — 99213 OFFICE O/P EST LOW 20 MIN: CPT | Performed by: ORTHOPAEDIC SURGERY

## 2023-06-05 NOTE — PROGRESS NOTES
Orthopaedic Surgery - Office Note  Corin Carreon (67 y o  female)   : 1965   MRN: [id number]  Encounter Date: 2023    Chief Complaint   Patient presents with   • Left Elbow - Follow-up       Assessment / Plan  Left elbow: radial head fracture- appropriate healing    · Discussed with patient that following these types of fractures there will likely be a slight loss of elbow extension  Currently patient has functional elbow range of motion   · Activities as tolerated  · Continue working with physical therapy and transition to HEP  · Ice and analgesics as needed for pain  Return if symptoms worsen or fail to improve  History of Present Illness  Corin Carreon is a 59 y o  female who presents for a follow up of left radial head fracture DOI: 3/20/2023  Patient was last seen on 2023 where it was discussed she may begin full weightbearing as tolerated with the left upper extremity  She has continued to work on elbow range of motion  She notes not having full elbow extension, but is working twice a week with physical therapy  Since last visit she has also returned to work to full capacity  She notes having difficulty with lifting heavy objects  She is not taking anything for pain  She denies any numbness or tingling  Review of Systems  Pertinent items are noted in HPI  All other systems were reviewed and are negative  Physical Exam  /77   Pulse 56   Ht 5' (1 524 m)   Wt 44 kg (97 lb)   BMI 18 94 kg/m²   Cons: Appears well  No apparent distress  Psych: Alert  Oriented x3  Mood and affect normal   Eyes: PERRLA, EOMI  Resp: Normal effort  No audible wheezing or stridor  CV: Palpable pulse  No discernable arrhythmia  No LE edema  Lymph:  No palpable cervical, axillary, or inguinal lymphadenopathy  Skin: Warm  No palpable masses  No visible lesions  Neuro: Normal muscle tone  Normal and symmetric DTR's       Left Elbow Exam  Alignment:  Normal elbow alignment and carrying angle   Inspection:  No swelling  No edema  No erythema  No ecchymosis  Palpation:  radial head  tenderness  ROM:  Elbow Extension 5  Elbow Flexion 145  Pronation 80  Supination 80  5 145 sup 80 pro 80   Strength:  Not tested  Stability:  No objective elbow instability  Stable varus and valgus stress  Tests:  No pertinent positive or negative tests  Neurovascular:  Sensation intact in Ax/R/M/U nerve distributions  2+ radial pulse  Studies Reviewed  No studies to review    Procedures  No procedures today  Medical, Surgical, Family, and Social History  The patient's medical history, family history, and social history, were reviewed and updated as appropriate      Past Medical History:   Diagnosis Date   • Asthma    • Colon polyp    • Fibroid    • Hypothyroidism    • Idiopathic thrombocytopenic purpura (ITP) ([facility] [address] [address] )     req'd splenectomy       Past Surgical History:   Procedure Laterality Date   • SPLENECTOMY     • TONSILECTOMY AND ADNOIDECTOMY     • TUBAL LIGATION Bilateral        Family History   Problem Relation Age of Onset   • Hypertension Mother    • Heart attack Mother    • Breast cancer Sister 50   • No Known Problems Daughter    • No Known Problems Maternal Grandmother    • No Known Problems Maternal Grandfather    • No Known Problems Paternal Grandmother    • No Known Problems Paternal Grandfather    • Breast cancer Maternal Aunt 61   • Cancer Maternal Uncle         melanoma   • Cancer Maternal Uncle         stomach   • No Known Problems Paternal Aunt    • Diabetes Family    • Colon cancer Neg Hx    • Ovarian cancer Neg Hx        Social History     Occupational History   • Not on file   Tobacco Use   • Smoking status: Former     Types: Cigarettes     Quit date:      Years since quittin 4   • Smokeless tobacco: Never   Vaping Use   • Vaping Use: Never used   Substance and Sexual Activity   • Alcohol use: Never   • Drug use: Never   • Sexual activity: Yes     Partners: Male Birth control/protection: Female Sterilization, Post-menopausal       Allergies   Allergen Reactions   • Ibuprofen Anaphylaxis, Edema and Hives   • Grass Extracts [Gramineae Pollens]    • Molds & Smuts    • Ragwitek [Short Ragweed Pollen Ext]    • Aspirin Hives         Current Outpatient Medications:   •  albuterol (2 5 mg/3 mL) 0 083 % nebulizer solution, , Disp: , Rfl:   •  albuterol (VENTOLIN HFA) 90 mcg/act inhaler, Inhale 2 puffs every 4 (four) hours as needed for wheezing, Disp: 1 Inhaler, Rfl: 0  •  AYR SALINE NASAL NO-DRIP NA, 1 spray into each nostril daily at bedtime, Disp: , Rfl:   •  cetirizine (ZyrTEC) 10 mg tablet, Take 10 mg by mouth daily  , Disp: , Rfl:   •  fluticasone (FLONASE) 50 mcg/act nasal spray, 2 sprays into each nostril daily, Disp: 16 g, Rfl: 11  •  levothyroxine 125 mcg tablet, TAKE 1 TAB BY MOUTH 6 TIMES WEEKLY ON EMPTY STOMACH, Disp: 78 tablet, Rfl: 1  •  Multiple Vitamin (multivitamin) tablet, Take 1 tablet by mouth daily, Disp: , Rfl:   •  vitamin B-12 (CYANOCOBALAMIN) 500 MCG TABS, Take 1 tablet (500 mcg total) by mouth daily, Disp:  , Rfl:   •  Advair -21 MCG/ACT inhaler, Inhale 2 puffs 2 (two) times a day, Disp: , Rfl:   •  ondansetron (ZOFRAN-ODT) 4 mg disintegrating tablet, Take 1 tablet (4 mg total) by mouth every 6 (six) hours as needed for nausea (Patient not taking: Reported on 3/27/2023), Disp: 20 tablet, Rfl: 0      Liliane Ward    Scribe Attestation    I,:  Vamsi Garcia am acting as a scribe while in the presence of the attending physician :       I,:  Killian Coleman MD personally performed the services described in this documentation    as scribed in my presence :

## 2023-06-06 ENCOUNTER — TELEPHONE (OUTPATIENT)
Dept: OBGYN CLINIC | Facility: CLINIC | Age: 58
End: 2023-06-06

## 2023-06-06 NOTE — TELEPHONE ENCOUNTER
Caller: [employer]    Doctor: Sharmaine Lacey    Reason for call: Requesting that the [address] note from 6/5/23 and updated work status be faxed    Fax #: [phone]    Call back#: [phone]

## 2023-06-19 ENCOUNTER — TELEPHONE (OUTPATIENT)
Dept: OBGYN CLINIC | Facility: CLINIC | Age: 58
End: 2023-06-19

## 2023-06-21 ENCOUNTER — APPOINTMENT (OUTPATIENT)
Dept: LAB | Facility: HOSPITAL | Age: 58
End: 2023-06-21
Payer: COMMERCIAL

## 2023-06-21 DIAGNOSIS — J45.20 MILD INTERMITTENT ASTHMA, UNSPECIFIED WHETHER COMPLICATED: ICD-10-CM

## 2023-06-21 DIAGNOSIS — J30.9 SPASMODIC RHINORRHEA: ICD-10-CM

## 2023-06-21 DIAGNOSIS — E03.9 MYXEDEMA HEART DISEASE: ICD-10-CM

## 2023-06-21 DIAGNOSIS — I51.9 MYXEDEMA HEART DISEASE: ICD-10-CM

## 2023-06-21 LAB
25(OH)D3 SERPL-MCNC: 17.7 NG/ML (ref 30–100)
ALBUMIN SERPL BCP-MCNC: 4.4 G/DL (ref 3.5–5)
ALP SERPL-CCNC: 54 U/L (ref 34–104)
ALT SERPL W P-5'-P-CCNC: 13 U/L (ref 7–52)
ANION GAP SERPL CALCULATED.3IONS-SCNC: 3 MMOL/L
AST SERPL W P-5'-P-CCNC: 13 U/L (ref 13–39)
BASOPHILS # BLD AUTO: 0.08 THOUSANDS/ÂΜL (ref 0–0.1)
BASOPHILS NFR BLD AUTO: 1 % (ref 0–1)
BILIRUB SERPL-MCNC: 0.59 MG/DL (ref 0.2–1)
BUN SERPL-MCNC: 12 MG/DL (ref 5–25)
CALCIUM SERPL-MCNC: 9.8 MG/DL (ref 8.4–10.2)
CHLORIDE SERPL-SCNC: 102 MMOL/L (ref 96–108)
CHOLEST SERPL-MCNC: 233 MG/DL
CO2 SERPL-SCNC: 30 MMOL/L (ref 21–32)
CREAT SERPL-MCNC: 0.72 MG/DL (ref 0.6–1.3)
EOSINOPHIL # BLD AUTO: 0.16 THOUSAND/ÂΜL (ref 0–0.61)
EOSINOPHIL NFR BLD AUTO: 2 % (ref 0–6)
ERYTHROCYTE [DISTWIDTH] IN BLOOD BY AUTOMATED COUNT: 15.3 % (ref 11.6–15.1)
GFR SERPL CREATININE-BSD FRML MDRD: 91 ML/MIN/1.73SQ M
GLUCOSE P FAST SERPL-MCNC: 92 MG/DL (ref 65–99)
HCT VFR BLD AUTO: 46.7 % (ref 34.8–46.1)
HDLC SERPL-MCNC: 64 MG/DL
HGB BLD-MCNC: 15.4 G/DL (ref 11.5–15.4)
IMM GRANULOCYTES # BLD AUTO: 0.04 THOUSAND/UL (ref 0–0.2)
IMM GRANULOCYTES NFR BLD AUTO: 0 % (ref 0–2)
LDLC SERPL CALC-MCNC: 147 MG/DL (ref 0–100)
LYMPHOCYTES # BLD AUTO: 2.1 THOUSANDS/ÂΜL (ref 0.6–4.47)
LYMPHOCYTES NFR BLD AUTO: 22 % (ref 14–44)
MCH RBC QN AUTO: 30.7 PG (ref 26.8–34.3)
MCHC RBC AUTO-ENTMCNC: 33 G/DL (ref 31.4–37.4)
MCV RBC AUTO: 93 FL (ref 82–98)
MONOCYTES # BLD AUTO: 0.81 THOUSAND/ÂΜL (ref 0.17–1.22)
MONOCYTES NFR BLD AUTO: 8 % (ref 4–12)
NEUTROPHILS # BLD AUTO: 6.44 THOUSANDS/ÂΜL (ref 1.85–7.62)
NEUTS SEG NFR BLD AUTO: 67 % (ref 43–75)
NONHDLC SERPL-MCNC: 169 MG/DL
NRBC BLD AUTO-RTO: 0 /100 WBCS
PLATELET # BLD AUTO: 299 THOUSANDS/UL (ref 149–390)
PMV BLD AUTO: 11.5 FL (ref 8.9–12.7)
POTASSIUM SERPL-SCNC: 4.1 MMOL/L (ref 3.5–5.3)
PROT SERPL-MCNC: 6.9 G/DL (ref 6.4–8.4)
RBC # BLD AUTO: 5.01 MILLION/UL (ref 3.81–5.12)
SODIUM SERPL-SCNC: 135 MMOL/L (ref 135–147)
T4 SERPL-MCNC: 11.44 UG/DL (ref 6.09–12.23)
TRIGL SERPL-MCNC: 112 MG/DL
TSH SERPL DL<=0.05 MIU/L-ACNC: 3.52 UIU/ML (ref 0.45–4.5)
WBC # BLD AUTO: 9.63 THOUSAND/UL (ref 4.31–10.16)

## 2023-06-21 PROCEDURE — 36415 COLL VENOUS BLD VENIPUNCTURE: CPT

## 2023-06-21 PROCEDURE — 84443 ASSAY THYROID STIM HORMONE: CPT

## 2023-06-21 PROCEDURE — 85025 COMPLETE CBC W/AUTO DIFF WBC: CPT

## 2023-06-21 PROCEDURE — 80061 LIPID PANEL: CPT

## 2023-06-21 PROCEDURE — 80053 COMPREHEN METABOLIC PANEL: CPT

## 2023-06-21 PROCEDURE — 82306 VITAMIN D 25 HYDROXY: CPT

## 2023-06-21 PROCEDURE — 84436 ASSAY OF TOTAL THYROXINE: CPT

## 2023-07-28 ENCOUNTER — HOSPITAL ENCOUNTER (EMERGENCY)
Facility: HOSPITAL | Age: 58
Discharge: HOME | End: 2023-07-28
Attending: EMERGENCY MEDICINE
Payer: COMMERCIAL

## 2023-07-28 ENCOUNTER — APPOINTMENT (EMERGENCY)
Dept: RADIOLOGY | Facility: HOSPITAL | Age: 58
End: 2023-07-28
Payer: COMMERCIAL

## 2023-07-28 VITALS
DIASTOLIC BLOOD PRESSURE: 88 MMHG | WEIGHT: 95 LBS | OXYGEN SATURATION: 98 % | BODY MASS INDEX: 18.65 KG/M2 | HEART RATE: 63 BPM | SYSTOLIC BLOOD PRESSURE: 182 MMHG | RESPIRATION RATE: 15 BRPM | HEIGHT: 60 IN | TEMPERATURE: 98.4 F

## 2023-07-28 DIAGNOSIS — M79.89 FINGER SWELLING: Primary | ICD-10-CM

## 2023-07-28 PROCEDURE — 99284 EMERGENCY DEPT VISIT MOD MDM: CPT | Performed by: EMERGENCY MEDICINE

## 2023-07-28 PROCEDURE — 99283 EMERGENCY DEPT VISIT LOW MDM: CPT

## 2023-07-28 PROCEDURE — 73130 X-RAY EXAM OF HAND: CPT

## 2023-07-28 RX ORDER — AMOXICILLIN AND CLAVULANATE POTASSIUM 875; 125 MG/1; MG/1
1 TABLET, FILM COATED ORAL EVERY 12 HOURS SCHEDULED
Qty: 14 TABLET | Refills: 0 | Status: SHIPPED | OUTPATIENT
Start: 2023-07-28 | End: 2023-08-04

## 2023-07-28 RX ORDER — OXYCODONE HYDROCHLORIDE 5 MG/1
5 TABLET ORAL ONCE
Status: COMPLETED | OUTPATIENT
Start: 2023-07-28 | End: 2023-07-28

## 2023-07-28 RX ADMIN — OXYCODONE HYDROCHLORIDE 5 MG: 5 TABLET ORAL at 10:26

## 2023-07-28 NOTE — ED PROVIDER NOTES
History  Chief Complaint   Patient presents with   • Finger Swelling     Left third digit swelling, no injury. 3 days of left 3rd finger pain and swelling  Pain and swelling located at the distal end of the digit  No known injury to the finger  No associated fevers  Does have a history of gout but has never had it in her hands          Prior to Admission Medications   Prescriptions Last Dose Informant Patient Reported? Taking?    AYR SALINE NASAL NO-DRIP NA  Self Yes No   Si spray into each nostril daily at bedtime   Advair -21 MCG/ACT inhaler   Yes No   Sig: Inhale 2 puffs 2 (two) times a day   Multiple Vitamin (multivitamin) tablet   Yes No   Sig: Take 1 tablet by mouth daily   albuterol (2.5 mg/3 mL) 0.083 % nebulizer solution   Yes No   albuterol (VENTOLIN HFA) 90 mcg/act inhaler  Self No No   Sig: Inhale 2 puffs every 4 (four) hours as needed for wheezing   cetirizine (ZyrTEC) 10 mg tablet  Self Yes No   Sig: Take 10 mg by mouth daily     fluticasone (FLONASE) 50 mcg/act nasal spray  Self No No   Si sprays into each nostril daily   levothyroxine 125 mcg tablet   No No   Sig: TAKE 1 TAB BY MOUTH 6 TIMES WEEKLY ON EMPTY STOMACH   ondansetron (ZOFRAN-ODT) 4 mg disintegrating tablet   No No   Sig: Take 1 tablet (4 mg total) by mouth every 6 (six) hours as needed for nausea   Patient not taking: Reported on 3/27/2023   vitamin B-12 (CYANOCOBALAMIN) 500 MCG TABS   No No   Sig: Take 1 tablet (500 mcg total) by mouth daily      Facility-Administered Medications: None       Past Medical History:   Diagnosis Date   • Asthma    • Colon polyp    • Fibroid    • Hypothyroidism    • Idiopathic thrombocytopenic purpura (ITP) ([facility])     req'd splenectomy       Past Surgical History:   Procedure Laterality Date   • SPLENECTOMY     • TONSILECTOMY AND ADNOIDECTOMY     • TUBAL LIGATION Bilateral        Family History   Problem Relation Age of Onset   • Hypertension Mother    • Heart attack Mother • Breast cancer Sister 50   • No Known Problems Daughter    • No Known Problems Maternal Grandmother    • No Known Problems Maternal Grandfather    • No Known Problems Paternal Grandmother    • No Known Problems Paternal Grandfather    • Breast cancer Maternal Aunt 61   • Cancer Maternal Uncle         melanoma   • Cancer Maternal Uncle         stomach   • No Known Problems Paternal Aunt    • Diabetes Family    • Colon cancer Neg Hx    • Ovarian cancer Neg Hx      I have reviewed and agree with the history as documented. E-Cigarette/Vaping   • E-Cigarette Use Never User      E-Cigarette/Vaping Substances     Social History     Tobacco Use   • Smoking status: Former     Types: Cigarettes     Quit date:      Years since quittin.5   • Smokeless tobacco: Never   Vaping Use   • Vaping Use: Never used   Substance Use Topics   • Alcohol use: Never   • Drug use: Never       Review of Systems   Constitutional: Negative for chills and fever. Musculoskeletal: Positive for arthralgias and joint swelling. All other systems reviewed and are negative. Physical Exam  Physical Exam  Vitals and nursing note reviewed. Constitutional:       General: She is awake. She is not in acute distress. Appearance: She is not toxic-appearing. HENT:      Head: Normocephalic and atraumatic. Eyes:      General: Vision grossly intact. Gaze aligned appropriately. Cardiovascular:      Rate and Rhythm: Normal rate and regular rhythm. Pulmonary:      Effort: Pulmonary effort is normal. No respiratory distress. Musculoskeletal:      Cervical back: Full passive range of motion without pain and neck supple. Comments: Left 3rd finger with swelling at the distal aspect. Tenderness over the DIP joint. Decreased ROM secondary to pain and swelling. Area mildly erythematous but not warm to the touch. No fluctuance. No obvious felon. No obvious subungal abscess or lesion. No tenderness over the flexor tendon.     Skin: General: Skin is warm and dry. Neurological:      General: No focal deficit present. Mental Status: She is alert and oriented to person, place, and time. Vital Signs  ED Triage Vitals [07/28/23 0937]   Temperature Pulse Respirations Blood Pressure SpO2   98.4 °F (36.9 °C) 63 15 (!) 182/88 98 %      Temp Source Heart Rate Source Patient Position - Orthostatic VS BP Location FiO2 (%)   Oral -- -- -- --      Pain Score       10 - Worst Possible Pain           Vitals:    07/28/23 0937   BP: (!) 182/88   Pulse: 63         Visual Acuity      ED Medications  Medications   oxyCODONE (ROXICODONE) IR tablet 5 mg (has no administration in time range)       Diagnostic Studies  Results Reviewed     None                 XR hand 3+ views LEFT    (Results Pending)              Procedures  Procedures         ED Course                               SBIRT 22yo+    Flowsheet Row Most Recent Value   Initial Alcohol Screen: US AUDIT-C     1. How often do you have a drink containing alcohol? 1 Filed at: 07/28/2023 0940   2. How many drinks containing alcohol do you have on a typical day you are drinking? 1 Filed at: 07/28/2023 0940   3a. Male UNDER 65: How often do you have five or more drinks on one occasion? 0 Filed at: 07/28/2023 0940   3b. FEMALE Any Age, or MALE 65+: How often do you have 4 or more drinks on one occassion? 0 Filed at: 07/28/2023 0940   Audit-C Score 2 Filed at: 07/28/2023 0426   ALANA: How many times in the past year have you. .. Used an illegal drug or used a prescription medication for non-medical reasons? Never Filed at: 07/28/2023 0940                    MDM    Disposition  Final diagnoses:   None     ED Disposition     None      Follow-up Information    None         Patient's Medications   Discharge Prescriptions    No medications on file       No discharge procedures on file.     PDMP Review       Value Time User    PDMP Reviewed  Yes 3/25/2021 10:11 AM Silvio Palmer DO          ED Provider  Electronically Signed by Schedule an appointment as soon as possible for a visit in 1 week  [address] [facility] [address]  [address] Emergency Department Emergency Medicine Go to  If symptoms worsen [address] [address]  [address] [facility] Emergency Department, [address] [place], [place], [address]          Discharge Medication List as of 7/28/2023 10:36 AM      START taking these medications    Details   amoxicillin-clavulanate (AUGMENTIN) 875-125 mg per tablet Take 1 tablet by mouth every 12 (twelve) hours for 7 days, Starting Fri 7/28/2023, Until Fri 8/4/2023, Normal         CONTINUE these medications which have NOT CHANGED    Details   Advair -21 MCG/ACT inhaler Inhale 2 puffs 2 (two) times a day, Starting Sun 1/31/2021, Historical Med      albuterol (2.5 mg/3 mL) 0.083 % nebulizer solution Starting Wed 6/22/2022, Historical Med      albuterol (VENTOLIN HFA) 90 mcg/act inhaler Inhale 2 puffs every 4 (four) hours as needed for wheezing, Starting Tue 10/2/2018, Normal      AYR SALINE NASAL NO-DRIP NA 1 spray into each nostril daily at bedtime, Historical Med      cetirizine (ZyrTEC) 10 mg tablet Take 10 mg by mouth daily  , Starting Fri 2/27/2015, Historical Med      fluticasone (FLONASE) 50 mcg/act nasal spray 2 sprays into each nostril daily, Starting Tue 10/2/2018, Normal      levothyroxine 125 mcg tablet TAKE 1 TAB BY MOUTH 6 TIMES WEEKLY ON EMPTY STOMACH, Normal      Multiple Vitamin (multivitamin) tablet Take 1 tablet by mouth daily, Historical Med      ondansetron (ZOFRAN-ODT) 4 mg disintegrating tablet Take 1 tablet (4 mg total) by mouth every 6 (six) hours as needed for nausea, Starting Mon 10/3/2022, Normal      vitamin B-12 (CYANOCOBALAMIN) 500 MCG TABS Take 1 tablet (500 mcg total) by mouth daily, Starting Tue 5/18/2021, No Print             No discharge procedures on file.     PDMP Review       Value Time User    PDMP Reviewed  Yes 3/25/2021 10:11 AM Doug Marquez DO          ED Provider  Electronically Signed by           Isaías Love DO  08/04/23 5354

## 2023-07-31 ENCOUNTER — HOSPITAL ENCOUNTER (EMERGENCY)
Facility: HOSPITAL | Age: 58
Discharge: HOME | End: 2023-07-31
Attending: EMERGENCY MEDICINE
Payer: COMMERCIAL

## 2023-07-31 VITALS
BODY MASS INDEX: 18.55 KG/M2 | WEIGHT: 95 LBS | DIASTOLIC BLOOD PRESSURE: 86 MMHG | OXYGEN SATURATION: 100 % | HEART RATE: 61 BPM | SYSTOLIC BLOOD PRESSURE: 165 MMHG | RESPIRATION RATE: 18 BRPM | TEMPERATURE: 97 F

## 2023-07-31 DIAGNOSIS — L03.012 PARONYCHIA OF FINGER OF LEFT HAND: Primary | ICD-10-CM

## 2023-07-31 PROCEDURE — 99282 EMERGENCY DEPT VISIT SF MDM: CPT

## 2023-07-31 PROCEDURE — 10060 I&D ABSCESS SIMPLE/SINGLE: CPT | Performed by: EMERGENCY MEDICINE

## 2023-07-31 RX ORDER — BUPIVACAINE HYDROCHLORIDE 5 MG/ML
20 INJECTION, SOLUTION EPIDURAL; INTRACAUDAL ONCE
Status: COMPLETED | OUTPATIENT
Start: 2023-07-31 | End: 2023-07-31

## 2023-07-31 RX ADMIN — BUPIVACAINE HYDROCHLORIDE 20 ML: 5 INJECTION, SOLUTION EPIDURAL; INTRACAUDAL; PERINEURAL at 08:59

## 2023-07-31 NOTE — ED PROVIDER NOTES
Pt Name: Carmen Segovia  MRN: [id number]  [address] [place] 1965  Age/Sex: 59 y.o. female  Date of evaluation: 2023  PCP: Cara Mosquera    Chief Complaint   Patient presents with   • Finger Pain     Here two days ago for same- hit left middle finger two days ago. Swelling and pressure to finger. HPI    Ty White presents to the Emergency Department complaining of throbbing pain in her left 3rd digit. She had been seen a few days ago and had xrays performed as well. Since that time it seems more swollen and pain has increased. HPI      Past Medical and Surgical History    Past Medical History:   Diagnosis Date   • Asthma    • Colon polyp    • Fibroid    • Hypothyroidism    • Idiopathic thrombocytopenic purpura (ITP) ([address])     req'd splenectomy       Past Surgical History:   Procedure Laterality Date   • SPLENECTOMY     • TONSILECTOMY AND ADNOIDECTOMY     • TUBAL LIGATION Bilateral        Family History   Problem Relation Age of Onset   • Hypertension Mother    • Heart attack Mother    • Breast cancer Sister 50   • No Known Problems Daughter    • No Known Problems Maternal Grandmother    • No Known Problems Maternal Grandfather    • No Known Problems Paternal Grandmother    • No Known Problems Paternal Grandfather    • Breast cancer Maternal Aunt 61   • Cancer Maternal Uncle         melanoma   • Cancer Maternal Uncle         stomach   • No Known Problems Paternal Aunt    • Diabetes Family    • Colon cancer Neg Hx    • Ovarian cancer Neg Hx        Social History     Tobacco Use   • Smoking status: Former     Types: Cigarettes     Quit date: 2017     Years since quittin.5   • Smokeless tobacco: Never   Vaping Use   • Vaping Use: Never used   Substance Use Topics   • Alcohol use: Never   • Drug use: Never         .     Allergies    Allergies   Allergen Reactions   • Ibuprofen Anaphylaxis, Edema and Hives   • Grass Extracts [Gramineae Pollens]    • Molds & Smuts • Ragwitek [Short Ragweed Pollen Ext]    • Aspirin Hives       Home Medications    Prior to Admission medications    Medication Sig Start Date End Date Taking? Authorizing Provider   Advair -21 MCG/ACT inhaler Inhale 2 puffs 2 (two) times a day 1/31/21   Historical Provider, MD   albuterol (2.5 mg/3 mL) 0.083 % nebulizer solution  6/22/22   Historical Provider, MD   albuterol (VENTOLIN HFA) 90 mcg/act inhaler Inhale 2 puffs every 4 (four) hours as needed for wheezing 10/2/18    Gemma, DO   amoxicillin-clavulanate (AUGMENTIN) 875-125 mg per tablet Take 1 tablet by mouth every 12 (twelve) hours for 7 days 7/28/23 8/4/23  Kala Mclain, DO   AYR SALINE NASAL NO-DRIP NA 1 spray into each nostril daily at bedtime    Historical Provider, MD   cetirizine (ZyrTEC) 10 mg tablet Take 10 mg by mouth daily   2/27/15   Historical Provider, MD   fluticasone (FLONASE) 50 mcg/act nasal spray 2 sprays into each nostril daily 10/2/18    [facility]   levothyroxine 125 mcg tablet TAKE 1 TAB BY MOUTH 6 TIMES WEEKLY ON EMPTY STOMACH 3/12/23    [facility] DO   Multiple Vitamin (multivitamin) tablet Take 1 tablet by mouth daily    Historical Provider, MD   ondansetron (ZOFRAN-ODT) 4 mg disintegrating tablet Take 1 tablet (4 mg total) by mouth every 6 (six) hours as needed for nausea  Patient not taking: Reported on 3/27/2023 10/3/22   Corinne Minder, MD   vitamin B-12 (CYANOCOBALAMIN) 500 MCG TABS Take 1 tablet (500 mcg total) by mouth daily 5/18/21    [facility] DO           Review of Systems    Review of Systems   Constitutional: Negative for chills and fever. HENT: Negative for ear pain and sore throat. Eyes: Negative for pain and visual disturbance. Respiratory: Negative for cough and shortness of breath. Cardiovascular: Negative for chest pain and palpitations. Gastrointestinal: Negative for abdominal pain and vomiting. Genitourinary: Negative for dysuria and hematuria.    Musculoskeletal: Negative for arthralgias and back pain. Skin: Negative for color change and rash. Neurological: Negative for seizures and syncope. All other systems reviewed and are negative. Physical Exam      ED Triage Vitals   Temperature Pulse Respirations Blood Pressure SpO2   07/31/23 0807 07/31/23 0807 07/31/23 0807 07/31/23 0807 07/31/23 0807   (!) 97 °F (36.1 °C) 61 18 165/86 100 %      Temp Source Heart Rate Source Patient Position - Orthostatic VS BP Location FiO2 (%)   07/31/23 0807 07/31/23 0807 07/31/23 0807 07/31/23 0807 --   Oral Monitor Sitting Right arm       Pain Score       07/31/23 0809       10 - Worst Possible Pain               Physical Exam  Vitals and nursing note reviewed. Constitutional:       General: She is not in acute distress. Appearance: She is well-developed. HENT:      Head: Normocephalic and atraumatic. Eyes:      Conjunctiva/sclera: Conjunctivae normal.   Cardiovascular:      Rate and Rhythm: Normal rate and regular rhythm. Heart sounds: No murmur heard. Pulmonary:      Effort: Pulmonary effort is normal. No respiratory distress. Breath sounds: Normal breath sounds. Abdominal:      Palpations: Abdomen is soft. Tenderness: There is no abdominal tenderness. Musculoskeletal:         General: No swelling. Left hand: Swelling present. Cervical back: Neck supple. Comments: Please see media   Skin:     General: Skin is warm and dry. Capillary Refill: Capillary refill takes less than 2 seconds. Neurological:      Mental Status: She is alert. Psychiatric:         Mood and Affect: Mood normal.         Assessment and Plan    Michel Owens is a 59 y.o. female who presents with left 3rd finger tip swelling and erythema. Physical examination remarkable for same. Plan will be to perform digit block as well as incision and drainage.        MDM    Diagnostic Results      Labs:    Procedure    Digital Block    Date/Time: 7/31/2023 9:07 AM    Performed by: Alina Fleming DO  Authorized by: Alina Fleming DO    Consent:     Consent obtained:  Verbal    Consent given by:  Patient    Alternatives discussed:  No treatment  Indications:     Indications:  Procedural anesthesia  Location:     Block location:  Finger    Finger blocked:  L long finger  Pre-procedure details:     Neurovascular status: intact      Skin preparation:  Alcohol  Procedure details (see MAR for exact dosages): Anesthetic injected:  Bupivacaine 0.25% w/o epi    Technique: Three-sided block    Injection procedure:  Anatomic landmarks identified  Post-procedure details:     Outcome:  Anesthesia achieved    Patient tolerance of procedure: Tolerated well, no immediate complications  Incision and drain    Date/Time: 7/31/2023 9:08 AM    Performed by: Alina Fleming DO  Authorized by: Alina Fleming DO  Universal Protocol:  Consent: Verbal consent obtained. Risks and benefits: risks, benefits and alternatives were discussed  Consent given by: patient  Patient identity confirmed: verbally with patient      Patient location:  ED  Location:     Type:  Abscess and subungual hematoma    Location:  Upper extremity    Upper extremity location:  L long finger  Pre-procedure details:     Skin preparation:  Chloraprep  Anesthesia (see MAR for exact dosages): Anesthesia method:  Nerve block  Procedure details:     Complexity:  Simple    Incision types:  Stab incision    Approach:  Open    Incision depth:  Subungual    Drainage:  Bloody and purulent    Drainage amount: Moderate    Wound treatment:  Wound left open    Packing materials:  None  Post-procedure details:     Patient tolerance of procedure:   Tolerated well, no immediate complications          ED Course of Care and Re-Assessments          Medications   bupivacaine (PF) (MARCAINE) 0.5 % injection 20 mL (20 mL Infiltration Given by Other 7/31/23 0859)           FINAL IMPRESSION    Final diagnoses:   Paronychia of finger of left hand         DISPOSITION/PLAN      Time reflects when diagnosis was documented in both MDM as applicable and the Disposition within this note     Time User Action Codes Description Comment    7/31/2023  8:58 AM Aracely Reyez Add [E55.161] Paronychia of finger of left hand       ED Disposition     ED Disposition   Discharge    Condition   Stable    Date/Time   Mon Jul 31, 2023  8:57 AM    Comment   Nailaayad Monet discharge to home/self care.                Follow-up Information     Follow up With Specialties Details Why [address] [place] [address], [facility] Schedule an appointment as soon as possible for a visit   [address] [place] [id number] [phone]              PATIENT REFERRED TO:    Pina Fontenot DO  [address] [place] [id number] [phone]    Schedule an appointment as soon as possible for a visit         DISCHARGE MEDICATIONS:    Discharge Medication List as of 7/31/2023  8:58 AM      CONTINUE these medications which have NOT CHANGED    Details   Advair -21 MCG/ACT inhaler Inhale 2 puffs 2 (two) times a day, Starting Sun 1/31/2021, Historical Med      albuterol (2.5 mg/3 mL) 0.083 % nebulizer solution Starting Wed 6/22/2022, Historical Med      albuterol (VENTOLIN HFA) 90 mcg/act inhaler Inhale 2 puffs every 4 (four) hours as needed for wheezing, Starting Tue 10/2/2018, Normal      amoxicillin-clavulanate (AUGMENTIN) 875-125 mg per tablet Take 1 tablet by mouth every 12 (twelve) hours for 7 days, Starting Fri 7/28/2023, Until Fri 8/4/2023, Normal      AYR SALINE NASAL NO-DRIP NA 1 spray into each nostril daily at bedtime, Historical Med      cetirizine (ZyrTEC) 10 mg tablet Take 10 mg by mouth daily  , Starting Fri 2/27/2015, Historical Med      fluticasone (FLONASE) 50 mcg/act nasal spray 2 sprays into each nostril daily, Starting Tue 10/2/2018, Normal      levothyroxine 125 mcg tablet TAKE 1 TAB BY MOUTH 6 TIMES WEEKLY ON EMPTY STOMACH, Normal      Multiple Vitamin (multivitamin) tablet Take 1 tablet by mouth daily, Historical Med      ondansetron (ZOFRAN-ODT) 4 mg disintegrating tablet Take 1 tablet (4 mg total) by mouth every 6 (six) hours as needed for nausea, Starting Mon 10/3/2022, Normal      vitamin B-12 (CYANOCOBALAMIN) 500 MCG TABS Take 1 tablet (500 mcg total) by mouth daily, Starting Tue 5/18/2021, No Print             No discharge procedures on file.          Scott Doss, [address] [facility], DO  07/31/23 6936

## 2023-07-31 NOTE — Clinical Note
Yaima Gadiels was seen and treated in our emergency department on 7/31/2023. Diagnosis:     Parvez Ferguson  may return to work on return date. She may return on this date: 08/02/2023         If you have any questions or concerns, please don't hesitate to call.       Cortney Farfan, DO    ______________________________           _______________          _______________  Hospital Representative                              Date                                Time

## 2023-08-28 ENCOUNTER — HOSPITAL ENCOUNTER (EMERGENCY)
Facility: HOSPITAL | Age: 58
Discharge: HOME | End: 2023-08-28
Attending: EMERGENCY MEDICINE | Admitting: EMERGENCY MEDICINE
Payer: COMMERCIAL

## 2023-08-28 VITALS
HEART RATE: 62 BPM | SYSTOLIC BLOOD PRESSURE: 158 MMHG | DIASTOLIC BLOOD PRESSURE: 84 MMHG | BODY MASS INDEX: 21.18 KG/M2 | OXYGEN SATURATION: 98 % | TEMPERATURE: 98.2 F | RESPIRATION RATE: 18 BRPM | WEIGHT: 108.47 LBS

## 2023-08-28 DIAGNOSIS — L02.811 ABSCESS OF SCALP: Primary | ICD-10-CM

## 2023-08-28 PROCEDURE — 99284 EMERGENCY DEPT VISIT MOD MDM: CPT | Performed by: EMERGENCY MEDICINE

## 2023-08-28 PROCEDURE — 99282 EMERGENCY DEPT VISIT SF MDM: CPT

## 2023-08-28 PROCEDURE — 10061 I&D ABSCESS COMP/MULTIPLE: CPT | Performed by: EMERGENCY MEDICINE

## 2023-08-28 RX ORDER — CEPHALEXIN 500 MG/1
500 CAPSULE ORAL EVERY 8 HOURS SCHEDULED
Qty: 15 CAPSULE | Refills: 0 | Status: SHIPPED | OUTPATIENT
Start: 2023-08-28 | End: 2023-09-02

## 2023-08-28 RX ORDER — LIDOCAINE HYDROCHLORIDE 10 MG/ML
5 INJECTION, SOLUTION EPIDURAL; INFILTRATION; INTRACAUDAL; PERINEURAL ONCE
Status: COMPLETED | OUTPATIENT
Start: 2023-08-28 | End: 2023-08-28

## 2023-08-28 RX ADMIN — LIDOCAINE HYDROCHLORIDE 5 ML: 10 INJECTION, SOLUTION EPIDURAL; INFILTRATION; INTRACAUDAL at 16:58

## 2023-08-28 NOTE — ASSESSMENT & PLAN NOTE
Her  noticed bruises on the back  With her history of ITP, will check CBC  Last mammo 8/12/22; orders placed

## 2023-08-28 NOTE — ED PROVIDER NOTES
History  Chief Complaint   Patient presents with   • Abscess     Patient reports abscess/possible boil/possible bug bite behind left neck that has spread up on to posterior head. Pt reports pain. Denies fevers. 60 yo F c/o painful bump on the back of her neck, increasing for 1 week, without fever or chills. She isn't sure how it started, but wonders if she was bitten. History provided by:  Patient      Prior to Admission Medications   Prescriptions Last Dose Informant Patient Reported? Taking?    AYR SALINE NASAL NO-DRIP NA  Self Yes No   Si spray into each nostril daily at bedtime   Advair -21 MCG/ACT inhaler   Yes No   Sig: Inhale 2 puffs 2 (two) times a day   Multiple Vitamin (multivitamin) tablet   Yes No   Sig: Take 1 tablet by mouth daily   albuterol (2.5 mg/3 mL) 0.083 % nebulizer solution   Yes No   albuterol (VENTOLIN HFA) 90 mcg/act inhaler  Self No No   Sig: Inhale 2 puffs every 4 (four) hours as needed for wheezing   cetirizine (ZyrTEC) 10 mg tablet  Self Yes No   Sig: Take 10 mg by mouth daily     fluticasone (FLONASE) 50 mcg/act nasal spray  Self No No   Si sprays into each nostril daily   levothyroxine 125 mcg tablet   No No   Sig: TAKE 1 TAB BY MOUTH 6 TIMES WEEKLY ON EMPTY STOMACH   ondansetron (ZOFRAN-ODT) 4 mg disintegrating tablet   No No   Sig: Take 1 tablet (4 mg total) by mouth every 6 (six) hours as needed for nausea   Patient not taking: Reported on 3/27/2023   vitamin B-12 (CYANOCOBALAMIN) 500 MCG TABS   No No   Sig: Take 1 tablet (500 mcg total) by mouth daily      Facility-Administered Medications: None       Past Medical History:   Diagnosis Date   • Asthma    • Colon polyp    • Fibroid    • Hypothyroidism    • Idiopathic thrombocytopenic purpura (ITP) ([address])     req'd splenectomy       Past Surgical History:   Procedure Laterality Date   • SPLENECTOMY     • TONSILECTOMY AND ADNOIDECTOMY     • TUBAL LIGATION Bilateral        Family History Problem Relation Age of Onset   • Hypertension Mother    • Heart attack Mother    • Breast cancer Sister 50   • No Known Problems Daughter    • No Known Problems Maternal Grandmother    • No Known Problems Maternal Grandfather    • No Known Problems Paternal Grandmother    • No Known Problems Paternal Grandfather    • Breast cancer Maternal Aunt 61   • Cancer Maternal Uncle         melanoma   • Cancer Maternal Uncle         stomach   • No Known Problems Paternal Aunt    • Diabetes Family    • Colon cancer Neg Hx    • Ovarian cancer Neg Hx      I have reviewed and agree with the history as documented. E-Cigarette/Vaping   • E-Cigarette Use Never User      E-Cigarette/Vaping Substances     Social History     Tobacco Use   • Smoking status: Former     Types: Cigarettes     Quit date:      Years since quittin.6   • Smokeless tobacco: Never   Vaping Use   • Vaping Use: Never used   Substance Use Topics   • Alcohol use: Never   • Drug use: Never       Review of Systems    Physical Exam  Physical Exam  Vitals and nursing note reviewed. Constitutional:       General: She is not in acute distress. Appearance: She is well-developed. She is not diaphoretic. HENT:      Head: Normocephalic and atraumatic. Right Ear: External ear normal.      Left Ear: External ear normal.      Nose: Nose normal.   Eyes:      Conjunctiva/sclera: Conjunctivae normal.      Pupils: Pupils are equal, round, and reactive to light. Neck:     Cardiovascular:      Rate and Rhythm: Normal rate and regular rhythm. Pulmonary:      Effort: Pulmonary effort is normal.   Abdominal:      Palpations: Abdomen is soft. Musculoskeletal:         General: Normal range of motion. Cervical back: Normal range of motion and neck supple. Lymphadenopathy:      Cervical: Cervical adenopathy present. Right cervical: Posterior cervical adenopathy (single reactive lymph node) present. Skin:     General: Skin is warm and dry. Capillary Refill: Capillary refill takes less than 2 seconds. Findings: No rash. Neurological:      Mental Status: She is alert and oriented to person, place, and time. Gait: Gait normal.   Psychiatric:         Behavior: Behavior normal.         Thought Content: Thought content normal.         Judgment: Judgment normal.         Vital Signs  ED Triage Vitals   Temperature Pulse Respirations Blood Pressure SpO2   08/28/23 1621 08/28/23 1622 08/28/23 1622 08/28/23 1622 08/28/23 1622   98.2 °F (36.8 °C) 62 18 158/84 98 %      Temp Source Heart Rate Source Patient Position - Orthostatic VS BP Location FiO2 (%)   08/28/23 1621 08/28/23 1622 -- -- --   Oral Monitor         Pain Score       08/28/23 1622       6           Vitals:    08/28/23 1622   BP: 158/84   Pulse: 62         Visual Acuity      ED Medications  Medications   lidocaine (PF) (XYLOCAINE-MPF) 1 % injection 5 mL (5 mL Infiltration Given by Other 8/28/23 3377)       Diagnostic Studies  Results Reviewed     None                 No orders to display              Procedures  Incision and drain    Date/Time: 8/28/2023 5:00 PM    Performed by: Pau Meyer MD  Authorized by: Pau Meyer MD  Universal Protocol:  Consent: Verbal consent obtained. Consent given by: patient  Patient understanding: patient states understanding of the procedure being performed  Patient identity confirmed: verbally with patient      Patient location:  ED  Location:     Type:  Abscess    Size:  1    Location:  Head/neck    Head/neck location:  Scalp  Pre-procedure details:     Skin preparation:  Betadine  Anesthesia (see MAR for exact dosages):      Anesthesia method:  Local infiltration    Local anesthetic:  Lidocaine 1% w/o epi  Procedure details:     Complexity:  Simple    Needle aspiration: no      Incision types:  Stab incision    Scalpel blade:  11    Approach:  Open    Incision depth:  Subcutaneous    Wound management:  Probed and deloculated    Drainage: Purulent    Packing materials:  None  Post-procedure details:     Patient tolerance of procedure: Tolerated well, no immediate complications             ED Course                               SBIRT 22yo+    Flowsheet Row Most Recent Value   Initial Alcohol Screen: US AUDIT-C     1. How often do you have a drink containing alcohol? 0 Filed at: 08/28/2023 1709   2. How many drinks containing alcohol do you have on a typical day you are drinking? 0 Filed at: 08/28/2023 1709   3b. FEMALE Any Age, or MALE 65+: How often do you have 4 or more drinks on one occassion? 0 Filed at: 08/28/2023 1709   Audit-C Score 0 Filed at: 08/28/2023 1709   ALANA: How many times in the past year have you. .. Used an illegal drug or used a prescription medication for non-medical reasons? Never Filed at: 08/28/2023 1709                    MDM    Disposition  Final diagnoses:   Abscess of scalp     Time reflects when diagnosis was documented in both MDM as applicable and the Disposition within this note     Time User Action Codes Description Comment    8/28/2023  5:17 PM Naveed Cabral Add [A40.657] Abscess of scalp       ED Disposition     ED Disposition   Discharge    Condition   Good    Date/Time   Mon Aug 28, 2023  5:17 PM    Comment   Samir Mari discharge to home/self care.                Follow-up Information     Follow up With Specialties Details Why [id number] [facility], DO Family Medicine Call  If symptoms worsen [address] [address] [phone]            Discharge Medication List as of 8/28/2023  5:18 PM      START taking these medications    Details   cephalexin (KEFLEX) 500 mg capsule Take 1 capsule (500 mg total) by mouth every 8 (eight) hours for 5 days, Starting Mon 8/28/2023, Until Sat 9/2/2023, Normal         CONTINUE these medications which have NOT CHANGED    Details   Advair -21 MCG/ACT inhaler Inhale 2 puffs 2 (two) times a day, Starting Sun 1/31/2021, Historical Med      albuterol (2.5 mg/3 mL) 0.083 % nebulizer solution Starting Wed 6/22/2022, Historical Med      albuterol (VENTOLIN HFA) 90 mcg/act inhaler Inhale 2 puffs every 4 (four) hours as needed for wheezing, Starting Tue 10/2/2018, Normal      AYR SALINE NASAL NO-DRIP NA 1 spray into each nostril daily at bedtime, Historical Med      cetirizine (ZyrTEC) 10 mg tablet Take 10 mg by mouth daily  , Starting Fri 2/27/2015, Historical Med      fluticasone (FLONASE) 50 mcg/act nasal spray 2 sprays into each nostril daily, Starting Tue 10/2/2018, Normal      levothyroxine 125 mcg tablet TAKE 1 TAB BY MOUTH 6 TIMES WEEKLY ON EMPTY STOMACH, Normal      Multiple Vitamin (multivitamin) tablet Take 1 tablet by mouth daily, Historical Med      ondansetron (ZOFRAN-ODT) 4 mg disintegrating tablet Take 1 tablet (4 mg total) by mouth every 6 (six) hours as needed for nausea, Starting Mon 10/3/2022, Normal      vitamin B-12 (CYANOCOBALAMIN) 500 MCG TABS Take 1 tablet (500 mcg total) by mouth daily, Starting Tue 5/18/2021, No Print             No discharge procedures on file.     PDMP Review       Value Time User    PDMP Reviewed  Yes 3/25/2021 10:11 AM Megan Ramirez DO          ED Provider  Electronically Signed by           Genita Primrose, MD  08/28/23 0484

## 2023-08-28 NOTE — DISCHARGE INSTRUCTIONS
Abscess   WHAT YOU NEED TO KNOW:   Your abscess was opened in the ED to drain on its own. The area should be kept clean with gentle soap and water and covered with a light dressing until it is healing well. Do not use peroxide at this point. DISCHARGE INSTRUCTIONS:   Return to the emergency department if:   The area around your abscess becomes very painful, warm, or has red streaks. You have a fever and chills. Your heart is beating faster than usual.     You feel faint or confused. Your abscess gets bigger or does not get better. Your abscess returns. You have questions or concerns about your condition or care.

## 2024-03-29 NOTE — TELEPHONE ENCOUNTER
Pt would like to know is she able to get a TB test for work she does work in [employer] no gross abnormalities

## 2024-08-26 ENCOUNTER — APPOINTMENT (EMERGENCY)
Dept: CT IMAGING | Facility: HOSPITAL | Age: 59
End: 2024-08-26
Payer: COMMERCIAL

## 2024-08-26 ENCOUNTER — HOSPITAL ENCOUNTER (EMERGENCY)
Facility: HOSPITAL | Age: 59
Discharge: HOME | End: 2024-08-26
Attending: EMERGENCY MEDICINE
Payer: COMMERCIAL

## 2024-08-26 VITALS
OXYGEN SATURATION: 97 % | DIASTOLIC BLOOD PRESSURE: 89 MMHG | HEART RATE: 62 BPM | TEMPERATURE: 97.8 F | WEIGHT: 117.5 LBS | RESPIRATION RATE: 20 BRPM | SYSTOLIC BLOOD PRESSURE: 162 MMHG | BODY MASS INDEX: 22.95 KG/M2

## 2024-08-26 DIAGNOSIS — R07.81 RIB PAIN ON LEFT SIDE: Primary | ICD-10-CM

## 2024-08-26 LAB
ANION GAP SERPL CALCULATED.3IONS-SCNC: 5 MMOL/L (ref 4–13)
ATRIAL RATE: 63 BPM
BACTERIA UR QL AUTO: ABNORMAL /HPF
BASOPHILS # BLD MANUAL: 0 THOUSAND/UL (ref 0–0.1)
BASOPHILS NFR MAR MANUAL: 0 % (ref 0–1)
BILIRUB UR QL STRIP: NEGATIVE
BUN SERPL-MCNC: 20 MG/DL (ref 5–25)
CALCIUM SERPL-MCNC: 9.6 MG/DL (ref 8.4–10.2)
CARDIAC TROPONIN I PNL SERPL HS: 3 NG/L
CHLORIDE SERPL-SCNC: 105 MMOL/L (ref 96–108)
CLARITY UR: CLEAR
CO2 SERPL-SCNC: 29 MMOL/L (ref 21–32)
COLOR UR: YELLOW
CREAT SERPL-MCNC: 0.92 MG/DL (ref 0.6–1.3)
D DIMER PPP FEU-MCNC: 0.34 UG/ML FEU
EOSINOPHIL # BLD MANUAL: 0 THOUSAND/UL (ref 0–0.4)
EOSINOPHIL NFR BLD MANUAL: 0 % (ref 0–6)
ERYTHROCYTE [DISTWIDTH] IN BLOOD BY AUTOMATED COUNT: 14.7 % (ref 11.6–15.1)
GFR SERPL CREATININE-BSD FRML MDRD: 67 ML/MIN/1.73SQ M
GLUCOSE SERPL-MCNC: 99 MG/DL (ref 65–140)
GLUCOSE UR STRIP-MCNC: NEGATIVE MG/DL
HCT VFR BLD AUTO: 39.9 % (ref 34.8–46.1)
HGB BLD-MCNC: 13.8 G/DL (ref 11.5–15.4)
HGB UR QL STRIP.AUTO: ABNORMAL
KETONES UR STRIP-MCNC: NEGATIVE MG/DL
LEUKOCYTE ESTERASE UR QL STRIP: NEGATIVE
LIPASE SERPL-CCNC: 20 U/L (ref 11–82)
LYMPHOCYTES # BLD AUTO: 2.06 THOUSAND/UL (ref 0.6–4.47)
LYMPHOCYTES # BLD AUTO: 25 % (ref 14–44)
MCH RBC QN AUTO: 31.7 PG (ref 26.8–34.3)
MCHC RBC AUTO-ENTMCNC: 34.6 G/DL (ref 31.4–37.4)
MCV RBC AUTO: 92 FL (ref 82–98)
MONOCYTES # BLD AUTO: 0.66 THOUSAND/UL (ref 0–1.22)
MONOCYTES NFR BLD: 8 % (ref 4–12)
MUCOUS THREADS UR QL AUTO: ABNORMAL
NEUTROPHILS # BLD MANUAL: 5.53 THOUSAND/UL (ref 1.85–7.62)
NEUTS SEG NFR BLD AUTO: 67 % (ref 43–75)
NITRITE UR QL STRIP: NEGATIVE
NON-SQ EPI CELLS URNS QL MICRO: ABNORMAL /HPF
P AXIS: 58 DEGREES
PH UR STRIP.AUTO: 6 [PH] (ref 4.5–8)
PLATELET # BLD AUTO: 258 THOUSANDS/UL (ref 149–390)
PLATELET BLD QL SMEAR: ADEQUATE
PMV BLD AUTO: 11.5 FL (ref 8.9–12.7)
POTASSIUM SERPL-SCNC: 3.7 MMOL/L (ref 3.5–5.3)
PR INTERVAL: 182 MS
PROT UR STRIP-MCNC: NEGATIVE MG/DL
QRS AXIS: 91 DEGREES
QRSD INTERVAL: 82 MS
QT INTERVAL: 404 MS
QTC INTERVAL: 413 MS
RBC # BLD AUTO: 4.36 MILLION/UL (ref 3.81–5.12)
RBC #/AREA URNS AUTO: ABNORMAL /HPF
RBC MORPH BLD: NORMAL
SODIUM SERPL-SCNC: 139 MMOL/L (ref 135–147)
SP GR UR STRIP.AUTO: >=1.03 (ref 1–1.03)
T WAVE AXIS: 60 DEGREES
UROBILINOGEN UR QL STRIP.AUTO: 0.2 E.U./DL
VENTRICULAR RATE: 63 BPM
WBC # BLD AUTO: 8.25 THOUSAND/UL (ref 4.31–10.16)
WBC #/AREA URNS AUTO: ABNORMAL /HPF

## 2024-08-26 PROCEDURE — 93010 ELECTROCARDIOGRAM REPORT: CPT

## 2024-08-26 PROCEDURE — 80048 BASIC METABOLIC PNL TOTAL CA: CPT | Performed by: EMERGENCY MEDICINE

## 2024-08-26 PROCEDURE — 83690 ASSAY OF LIPASE: CPT | Performed by: EMERGENCY MEDICINE

## 2024-08-26 PROCEDURE — 99285 EMERGENCY DEPT VISIT HI MDM: CPT | Performed by: EMERGENCY MEDICINE

## 2024-08-26 PROCEDURE — 96365 THER/PROPH/DIAG IV INF INIT: CPT

## 2024-08-26 PROCEDURE — 85007 BL SMEAR W/DIFF WBC COUNT: CPT | Performed by: EMERGENCY MEDICINE

## 2024-08-26 PROCEDURE — 36415 COLL VENOUS BLD VENIPUNCTURE: CPT | Performed by: EMERGENCY MEDICINE

## 2024-08-26 PROCEDURE — 81001 URINALYSIS AUTO W/SCOPE: CPT

## 2024-08-26 PROCEDURE — 84484 ASSAY OF TROPONIN QUANT: CPT | Performed by: EMERGENCY MEDICINE

## 2024-08-26 PROCEDURE — 74177 CT ABD & PELVIS W/CONTRAST: CPT

## 2024-08-26 PROCEDURE — 96375 TX/PRO/DX INJ NEW DRUG ADDON: CPT

## 2024-08-26 PROCEDURE — 85379 FIBRIN DEGRADATION QUANT: CPT | Performed by: EMERGENCY MEDICINE

## 2024-08-26 PROCEDURE — 85027 COMPLETE CBC AUTOMATED: CPT | Performed by: EMERGENCY MEDICINE

## 2024-08-26 PROCEDURE — 99284 EMERGENCY DEPT VISIT MOD MDM: CPT

## 2024-08-26 PROCEDURE — 93005 ELECTROCARDIOGRAM TRACING: CPT

## 2024-08-26 RX ORDER — LIDOCAINE 50 MG/G
1 PATCH TOPICAL DAILY
Qty: 10 PATCH | Refills: 0 | Status: SHIPPED | OUTPATIENT
Start: 2024-08-26

## 2024-08-26 RX ORDER — LIDOCAINE 50 MG/G
2 PATCH TOPICAL ONCE
Status: DISCONTINUED | OUTPATIENT
Start: 2024-08-26 | End: 2024-08-26 | Stop reason: HOSPADM

## 2024-08-26 RX ORDER — ACETAMINOPHEN 10 MG/ML
1000 INJECTION, SOLUTION INTRAVENOUS ONCE
Status: COMPLETED | OUTPATIENT
Start: 2024-08-26 | End: 2024-08-26

## 2024-08-26 RX ORDER — ONDANSETRON 2 MG/ML
4 INJECTION INTRAMUSCULAR; INTRAVENOUS ONCE
Status: DISCONTINUED | OUTPATIENT
Start: 2024-08-26 | End: 2024-08-26 | Stop reason: HOSPADM

## 2024-08-26 RX ADMIN — MORPHINE SULFATE 6 MG: 2 INJECTION, SOLUTION INTRAMUSCULAR; INTRAVENOUS at 18:38

## 2024-08-26 RX ADMIN — LIDOCAINE 2 PATCH: 50 PATCH TOPICAL at 17:43

## 2024-08-26 RX ADMIN — ACETAMINOPHEN 1000 MG: 10 INJECTION INTRAVENOUS at 17:42

## 2024-08-26 RX ADMIN — IOHEXOL 100 ML: 350 INJECTION, SOLUTION INTRAVENOUS at 19:01

## 2024-08-26 NOTE — ED PROVIDER NOTES
History  Chief Complaint   Patient presents with    Pain With Breathing     Reports pain with inspiration to L rib. Denies injury.      59 y.o. F p/w left lateral rib pain x today.  Reports came on randomly. Sharp, worse with breathing, nonradiating.  No h/o same pain.  Denies F/C, cough, SOB, abd pain, h/o PE/DVT, recent travel.      History provided by:  Patient   used: No        Prior to Admission Medications   Prescriptions Last Dose Informant Patient Reported? Taking?   AYR SALINE NASAL NO-DRIP NA  Self Yes No   Si spray into each nostril daily at bedtime   Advair -21 MCG/ACT inhaler   Yes No   Sig: Inhale 2 puffs 2 (two) times a day   Multiple Vitamin (multivitamin) tablet   Yes No   Sig: Take 1 tablet by mouth daily   albuterol (2.5 mg/3 mL) 0.083 % nebulizer solution   Yes No   albuterol (VENTOLIN HFA) 90 mcg/act inhaler  Self No No   Sig: Inhale 2 puffs every 4 (four) hours as needed for wheezing   cetirizine (ZyrTEC) 10 mg tablet  Self Yes No   Sig: Take 10 mg by mouth daily     fluticasone (FLONASE) 50 mcg/act nasal spray  Self No No   Si sprays into each nostril daily   levothyroxine 125 mcg tablet   No No   Sig: TAKE 1 TAB BY MOUTH 6 TIMES WEEKLY ON EMPTY STOMACH   ondansetron (ZOFRAN-ODT) 4 mg disintegrating tablet   No No   Sig: Take 1 tablet (4 mg total) by mouth every 6 (six) hours as needed for nausea   Patient not taking: Reported on 3/27/2023   vitamin B-12 (CYANOCOBALAMIN) 500 MCG TABS   No No   Sig: Take 1 tablet (500 mcg total) by mouth daily      Facility-Administered Medications: None       Past Medical History:   Diagnosis Date    Asthma     Colon polyp     Fibroid     Hypothyroidism     Idiopathic thrombocytopenic purpura (ITP) ([facility]) 1998    req'd splenectomy       Past Surgical History:   Procedure Laterality Date    SPLENECTOMY  1998    TONSILECTOMY AND ADNOIDECTOMY  1970    TUBAL LIGATION Bilateral        Family History   Problem Relation Age of  Onset    Hypertension Mother     Heart attack Mother     Breast cancer Sister 48    No Known Problems Daughter     No Known Problems Maternal Grandmother     No Known Problems Maternal Grandfather     No Known Problems Paternal Grandmother     No Known Problems Paternal Grandfather     Breast cancer Maternal Aunt 60    Cancer Maternal Uncle         melanoma    Cancer Maternal Uncle         stomach    No Known Problems Paternal Aunt     Diabetes Family     Colon cancer Neg Hx     Ovarian cancer Neg Hx      I have reviewed and agree with the history as documented.    E-Cigarette/Vaping    E-Cigarette Use Never User      E-Cigarette/Vaping Substances     Social History     Tobacco Use    Smoking status: Former     Current packs/day: 0.00     Types: Cigarettes     Quit date:      Years since quittin.6    Smokeless tobacco: Never   Vaping Use    Vaping status: Never Used   Substance Use Topics    Alcohol use: Never    Drug use: Never       Review of Systems   Constitutional:  Negative for chills and fever.   Respiratory:  Negative for cough and shortness of breath.    Cardiovascular:  Positive for chest pain (left lateral ribs). Negative for leg swelling.   Gastrointestinal:  Positive for nausea. Negative for abdominal pain, constipation, diarrhea and vomiting.   Genitourinary:  Negative for dysuria and frequency.       Physical Exam  Physical Exam  Vitals and nursing note reviewed.   Constitutional:       General: She is not in acute distress.     Appearance: Normal appearance. She is well-developed. She is not ill-appearing, toxic-appearing or diaphoretic.   HENT:      Head: Normocephalic and atraumatic.   Eyes:      General: No scleral icterus.  Neck:      Vascular: No JVD.   Cardiovascular:      Rate and Rhythm: Normal rate and regular rhythm.      Heart sounds: Normal heart sounds. No murmur heard.  Pulmonary:      Effort: Pulmonary effort is normal. No accessory muscle usage or respiratory distress.       Breath sounds: Normal breath sounds. No stridor. No wheezing, rhonchi or rales.   Chest:      Chest wall: Tenderness (left lower lateral ribs) present.   Abdominal:      General: There is no distension.      Palpations: Abdomen is soft. Abdomen is not rigid. There is no mass.      Tenderness: There is abdominal tenderness in the left upper quadrant. There is no guarding or rebound. Negative signs include Pardo's sign and McBurney's sign.   Skin:     General: Skin is warm and dry.      Coloration: Skin is not jaundiced or pale.      Findings: No rash.   Neurological:      Mental Status: She is alert.      GCS: GCS eye subscore is 4. GCS verbal subscore is 5. GCS motor subscore is 6.         Vital Signs  ED Triage Vitals   Temperature Pulse Respirations Blood Pressure SpO2   08/26/24 1648 08/26/24 1648 08/26/24 1648 08/26/24 1648 08/26/24 1648   97.8 °F (36.6 °C) 71 14 (!) 172/80 96 %      Temp Source Heart Rate Source Patient Position - Orthostatic VS BP Location FiO2 (%)   08/26/24 1648 08/26/24 1648 08/26/24 1648 08/26/24 1648 --   Oral Monitor Sitting Right arm       Pain Score       08/26/24 1838       7           Vitals:    08/26/24 1648 08/26/24 1842 08/26/24 1845   BP: (!) 172/80 169/89 162/89   Pulse: 71 62 62   Patient Position - Orthostatic VS: Sitting Lying Lying         Visual Acuity      ED Medications  Medications   lidocaine (LIDODERM) 5 % patch 2 patch (2 patches Topical Medication Applied 8/26/24 1743)   ondansetron (ZOFRAN) injection 4 mg (0 mg Intravenous Hold 8/26/24 1808)   acetaminophen (Ofirmev) injection 1,000 mg (0 mg Intravenous Stopped 8/26/24 1840)   morphine injection 6 mg (6 mg Intravenous Given 8/26/24 1838)   iohexol (OMNIPAQUE) 350 MG/ML injection (MULTI-DOSE) 100 mL (100 mL Intravenous Given 8/26/24 1901)       Diagnostic Studies  Results Reviewed       Procedure Component Value Units Date/Time    Urine Microscopic [[id number]]  (Abnormal) Collected: 08/26/24 1812    Lab Status:  Final result Specimen: Urine, Clean Catch Updated: 08/26/24 1902     RBC, UA 1-2 /hpf      WBC, UA 1-2 /hpf      Epithelial Cells Occasional /hpf      Bacteria, UA None Seen /hpf      MUCUS THREADS Occasional    RBC Morphology Reflex Test [[id number]] Collected: 08/26/24 1739    Lab Status: Final result Specimen: Blood from Arm, Right Updated: 08/26/24 1901    CBC and differential [[id number]]  (Normal) Collected: 08/26/24 1739    Lab Status: Final result Specimen: Blood from Arm, Right Updated: 08/26/24 1842     WBC 8.25 Thousand/uL      RBC 4.36 Million/uL      Hemoglobin 13.8 g/dL      Hematocrit 39.9 %      MCV 92 fL      MCH 31.7 pg      MCHC 34.6 g/dL      RDW 14.7 %      MPV 11.5 fL      Platelets 258 Thousands/uL     Narrative:      This is an appended report.  These results have been appended to a previously verified report.    Manual Differential(PHLEBS Do Not Order) [[id number]] Collected: 08/26/24 1739    Lab Status: Final result Specimen: Blood from Arm, Right Updated: 08/26/24 1842     Segmented % 67 %      Lymphocytes % 25 %      Monocytes % 8 %      Eosinophils % 0 %      Basophils % 0 %      Absolute Neutrophils 5.53 Thousand/uL      Absolute Lymphocytes 2.06 Thousand/uL      Absolute Monocytes 0.66 Thousand/uL      Absolute Eosinophils 0.00 Thousand/uL      Absolute Basophils 0.00 Thousand/uL      Total Counted --     RBC Morphology Normal     Platelet Estimate Adequate    Urine Macroscopic, POC [[id number]]  (Abnormal) Collected: 08/26/24 1812    Lab Status: Final result Specimen: Urine Updated: 08/26/24 1813     Color, UA Yellow     Clarity, UA Clear     pH, UA 6.0     Leukocytes, UA Negative     Nitrite, UA Negative     Protein, UA Negative mg/dl      Glucose, UA Negative mg/dl      Ketones, UA Negative mg/dl      Urobilinogen, UA 0.2 E.U./dl      Bilirubin, UA Negative     Occult Blood, UA Trace     Specific Gravity, UA >=1.030    Narrative:      CLINITEK RESULT    HS Troponin 0hr (reflex protocol)  [[id number]]  (Normal) Collected: 08/26/24 1739    Lab Status: Final result Specimen: Blood from Arm, Right Updated: 08/26/24 1810     hs TnI 0hr 3 ng/L     Basic metabolic panel [[id number]] Collected: 08/26/24 1739    Lab Status: Final result Specimen: Blood from Arm, Right Updated: 08/26/24 1804     Sodium 139 mmol/L      Potassium 3.7 mmol/L      Chloride 105 mmol/L      CO2 29 mmol/L      ANION GAP 5 mmol/L      BUN 20 mg/dL      Creatinine 0.92 mg/dL      Glucose 99 mg/dL      Calcium 9.6 mg/dL      eGFR 67 ml/min/1.73sq m     Narrative:      [employer] guidelines for Chronic Kidney Disease (CKD):     Stage 1 with normal or high GFR (GFR > 90 mL/min/1.73 square meters)    Stage 2 Mild CKD (GFR = 60-89 mL/min/1.73 square meters)    Stage 3A Moderate CKD (GFR = 45-59 mL/min/1.73 square meters)    Stage 3B Moderate CKD (GFR = 30-44 mL/min/1.73 square meters)    Stage 4 Severe CKD (GFR = 15-29 mL/min/1.73 square meters)    Stage 5 End Stage CKD (GFR <15 mL/min/1.73 square meters)  Note: GFR calculation is accurate only with a steady state creatinine    Lipase [[id number]]  (Normal) Collected: 08/26/24 1739    Lab Status: Final result Specimen: Blood from Arm, Right Updated: 08/26/24 1804     Lipase 20 u/L     D-Dimer [[id number]]  (Normal) Collected: 08/26/24 1739    Lab Status: Final result Specimen: Blood from Arm, Right Updated: 08/26/24 1804     D-Dimer, Quant 0.34 ug/ml FEU     Narrative:      In the evaluation for possible pulmonary embolism, in the appropriate (Well's Score of 4 or less) patient, the age adjusted d-dimer cutoff for this patient can be calculated as:    Age x 0.01 (in ug/mL) for Age-adjusted D-dimer exclusion threshold for a patient over 50 years.                   CT abdomen pelvis with contrast   Final Result by Fredo Desir MD (08/26 2003)      No acute findings.         Workstation performed: [id number]                    Procedures  ECG 12 Lead  Documentation Only    Date/Time: 8/26/2024 5:40 PM    Performed by: Sarita Rene DO  Authorized by: Sarita Rene DO    Indications / Diagnosis:  Rib pain  ECG reviewed by me, the ED Provider: yes    Patient location:  Bedside  Rate:     ECG rate:  63    ECG rate assessment: normal    Rhythm:     Rhythm: sinus rhythm    QRS:     QRS axis:  Normal    QRS intervals:  Normal  ST segments:     ST segments:  Normal  T waves:     T waves: normal             ED Course  ED Course as of 08/26/24 2053   Mon Aug 26, 2024   1702 SpO2: 96 %  Not hypoxic   1747 CBC and differential  Normal   1808 D-Dimer, Quant: 0.34  Negative for PE   1808 LIPASE: 20  Negative for pancreatitis   1808 Basic metabolic panel  Normal   1813 Pt reports still having pain.  Morphine ordered. Updated pt on labs. A/w CT scan.   1935 Pt reports feeling better.  A/w CT read.   2012 Updated pt on CT result.                         PERC Rule for PE      Flowsheet Row Most Recent Value   PERC Rule for PE    Age >=50 1 Filed at: 08/26/2024 1750   HR >=100 0 Filed at: 08/26/2024 1750   O2 Sat on room air < 95% 0 Filed at: 08/26/2024 1750   History of PE or DVT 0 Filed at: 08/26/2024 1750   Recent trauma or surgery 0 Filed at: 08/26/2024 1750   Hemoptysis 0 Filed at: 08/26/2024 1750   Exogenous estrogen 0 Filed at: 08/26/2024 1750   Unilateral leg swelling 0 Filed at: 08/26/2024 1750   PERC Rule for PE Results 1 Filed at: 08/26/2024 1750                SBIRT 22yo+      Flowsheet Row Most Recent Value   Initial Alcohol Screen: US AUDIT-C     1. How often do you have a drink containing alcohol? 0 Filed at: 08/26/2024 1810   2. How many drinks containing alcohol do you have on a typical day you are drinking?  0 Filed at: 08/26/2024 1810   3b. FEMALE Any Age, or MALE 65+: How often do you have 4 or more drinks on one occassion? 0 Filed at: 08/26/2024 1810   Audit-C Score 0 Filed at: 08/26/2024 1810   ALANA: How many times in the past year have you...     Used an illegal drug or used a prescription medication for non-medical reasons? Never Filed at: 08/26/2024 1810            Wells' Criteria for PE      Flowsheet Row Most Recent Value   Wells' Criteria for PE    Clinical signs and symptoms of DVT 0 Filed at: 08/26/2024 1749   PE is primary diagnosis or equally likely 0 Filed at: 08/26/2024 1749   HR >100 0 Filed at: 08/26/2024 1749   Immobilization at least 3 days or Surgery in the previous 4 weeks 0 Filed at: 08/26/2024 1749   Previous, objectively diagnosed PE or DVT 0 Filed at: 08/26/2024 1749   Hemoptysis 0 Filed at: 08/26/2024 1749   Malignancy with treatment within 6 months or palliative 0 Filed at: 08/26/2024 1749   Wells' Criteria Total 0 Filed at: 08/26/2024 1749                  Medical Decision Making  Left rib pain - Will check CBC to r/o anemia, BMP to r/o lyte abnormality/KATE, lipase to r/o pancreatitis, dimer to r/o PE, troponin to assess for NSTEMI, EKG to r/o STEMI/ischemic changes.  If dimer negative, will CT A/P to r/o ureteral stone/rib fx/diverticulitis/splenic infarct.    Amount and/or Complexity of Data Reviewed  Labs: ordered. Decision-making details documented in ED Course.  Radiology: ordered.    Risk  Prescription drug management.                 Disposition  Final diagnoses:   Rib pain on left side     Time reflects when diagnosis was documented in both MDM as applicable and the Disposition within this note       Time User Action Codes Description Comment    8/26/2024  8:11 PM Sarita Rene Add [R07.81] Rib pain on left side           ED Disposition       ED Disposition   Discharge    Condition   Stable    Date/Time   Mon Aug 26, 2024 2012    Comment   Wilda Mejía discharge to home/self care.                   Follow-up Information    None         Discharge Medication List as of 8/26/2024  8:12 PM        START taking these medications    Details   lidocaine (Lidoderm) 5 % Apply 1 patch topically over 12 hours daily Remove & Discard  patch within 12 hours or as directed by MD, Starting Mon 8/26/2024, Normal           CONTINUE these medications which have NOT CHANGED    Details   Advair -21 MCG/ACT inhaler Inhale 2 puffs 2 (two) times a day, Starting Sun 1/31/2021, Historical Med      albuterol (2.5 mg/3 mL) 0.083 % nebulizer solution Starting Wed 6/22/2022, Historical Med      albuterol (VENTOLIN HFA) 90 mcg/act inhaler Inhale 2 puffs every 4 (four) hours as needed for wheezing, Starting Tue 10/2/2018, Normal      AYR SALINE NASAL NO-DRIP NA 1 spray into each nostril daily at bedtime, Historical Med      cetirizine (ZyrTEC) 10 mg tablet Take 10 mg by mouth daily  , Starting Fri 2/27/2015, Historical Med      fluticasone (FLONASE) 50 mcg/act nasal spray 2 sprays into each nostril daily, Starting Tue 10/2/2018, Normal      levothyroxine 125 mcg tablet TAKE 1 TAB BY MOUTH 6 TIMES WEEKLY ON EMPTY STOMACH, Normal      Multiple Vitamin (multivitamin) tablet Take 1 tablet by mouth daily, Historical Med      ondansetron (ZOFRAN-ODT) 4 mg disintegrating tablet Take 1 tablet (4 mg total) by mouth every 6 (six) hours as needed for nausea, Starting Mon 10/3/2022, Normal      vitamin B-12 (CYANOCOBALAMIN) 500 MCG TABS Take 1 tablet (500 mcg total) by mouth daily, Starting Tue 5/18/2021, No Print             No discharge procedures on file.    PDMP Review         Value Time User    PDMP Reviewed  Yes 3/25/2021 10:11 AM Dilip Ramirez DO            ED Provider  Electronically Signed by             Sarita Rene DO  08/26/24 2053

## 2025-03-29 ENCOUNTER — APPOINTMENT (EMERGENCY)
Dept: RADIOLOGY | Facility: HOSPITAL | Age: 60
End: 2025-03-29
Payer: COMMERCIAL

## 2025-03-29 ENCOUNTER — HOSPITAL ENCOUNTER (EMERGENCY)
Facility: HOSPITAL | Age: 60
Discharge: HOME | End: 2025-03-29
Attending: EMERGENCY MEDICINE
Payer: COMMERCIAL

## 2025-03-29 VITALS
TEMPERATURE: 97.9 F | OXYGEN SATURATION: 95 % | HEART RATE: 55 BPM | SYSTOLIC BLOOD PRESSURE: 111 MMHG | BODY MASS INDEX: 22.95 KG/M2 | DIASTOLIC BLOOD PRESSURE: 75 MMHG | RESPIRATION RATE: 18 BRPM | WEIGHT: 117.5 LBS

## 2025-03-29 DIAGNOSIS — S76.012A HIP STRAIN, LEFT, INITIAL ENCOUNTER: ICD-10-CM
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
## 2025-03-29 PROCEDURE — 72170 X-RAY EXAM OF PELVIS: CPT

## 2025-03-29 PROCEDURE — 73552 X-RAY EXAM OF FEMUR 2/>: CPT

## 2025-03-29 PROCEDURE — 99285 EMERGENCY DEPT VISIT HI MDM: CPT | Performed by: EMERGENCY MEDICINE

## 2025-03-29 PROCEDURE — 99284 EMERGENCY DEPT VISIT MOD MDM: CPT

## 2025-03-29 RX ORDER — OXYCODONE AND ACETAMINOPHEN 5; 325 MG/1; MG/1
1 TABLET ORAL ONCE
Refills: 0 | Status: COMPLETED | OUTPATIENT
Start: 2025-03-29 | End: 2025-03-29

## 2025-03-29 RX ORDER — ACETAMINOPHEN 325 MG/1
650 TABLET ORAL EVERY 6 HOURS PRN
Qty: 30 TABLET | Refills: 0 | Status: SHIPPED | OUTPATIENT
Start: 2025-03-29

## 2025-03-29 RX ORDER — OXYCODONE HYDROCHLORIDE 5 MG/1
5 TABLET ORAL EVERY 6 HOURS PRN
Qty: 10 TABLET | Refills: 0 | Status: SHIPPED | OUTPATIENT
Start: 2025-03-29 | End: 2025-04-01

## 2025-03-29 RX ADMIN — OXYCODONE HYDROCHLORIDE AND ACETAMINOPHEN 1 TABLET: 5; 325 TABLET ORAL at 20:30

## 2025-03-29 NOTE — Clinical Note
Wilda Mejía was seen and treated in our emergency department on 3/29/2025.                Diagnosis:     Wilda  may return to work on return date.    She may return on this date: 04/03/2025    Please allow use of assistive devices such as cane, crutches, or walker.     If you have any questions or concerns, please don't hesitate to call.      Meeta Merrill MD    ______________________________           _______________          _______________  Hospital Representative                              Date                                Time

## 2025-03-30 NOTE — ED PROVIDER NOTES
Time reflects when diagnosis was documented in both MDM as applicable and the Disposition within this note       Time User Action Codes Description Comment    3/29/2025 10:04 PM Meeta Merrill Add [W19.XXXA] Fall, initial encounter     3/29/2025 10:04 PM Meeta Merrill Add [S76.012A] Hip strain, left, initial encounter           ED Disposition       ED Disposition   Discharge    Condition   Stable    Date/Time   Sat Mar 29, 2025 10:04 PM    Comment   Wilda Mejía discharge to home/self care.                   Assessment & Plan       Medical Decision Making  Amount and/or Complexity of Data Reviewed  Radiology: ordered and independent interpretation performed.    Risk  OTC drugs.  Prescription drug management.             Medications   oxyCODONE-acetaminophen (PERCOCET) 5-325 mg per tablet 1 tablet (1 tablet Oral Given 3/29/25 2030)       ED Risk Strat Scores                            SBIRT 22yo+      Flowsheet Row Most Recent Value   Initial Alcohol Screen: US AUDIT-C     1. How often do you have a drink containing alcohol? 0 Filed at: 03/29/2025 1957   2. How many drinks containing alcohol do you have on a typical day you are drinking?  0 Filed at: 03/29/2025 1957   3a. Male UNDER 65: How often do you have five or more drinks on one occasion? 0 Filed at: 03/29/2025 1957   3b. FEMALE Any Age, or MALE 65+: How often do you have 4 or more drinks on one occassion? 0 Filed at: 03/29/2025 1957   Audit-C Score 0 Filed at: 03/29/2025 1957   ALANA: How many times in the past year have you...    Used an illegal drug or used a prescription medication for non-medical reasons? Never Filed at: 03/29/2025 1957                            History of Present Illness       Chief Complaint   Patient presents with    Fall     Reports fell down 3 steps denies head injury, loc and use of thinner c/o left thigh pain        Past Medical History:   Diagnosis Date    Asthma     Colon polyp     Fibroid     Hypothyroidism     Idiopathic  thrombocytopenic purpura (ITP) ([facility]) 1998    req'd splenectomy      Past Surgical History:   Procedure Laterality Date    SPLENECTOMY  1998    TONSILECTOMY AND ADNOIDECTOMY  1970    TUBAL LIGATION Bilateral       Family History   Problem Relation Age of Onset    Hypertension Mother     Heart attack Mother     Breast cancer Sister 48    No Known Problems Daughter     No Known Problems Maternal Grandmother     No Known Problems Maternal Grandfather     No Known Problems Paternal Grandmother     No Known Problems Paternal Grandfather     Breast cancer Maternal Aunt 60    Cancer Maternal Uncle         melanoma    Cancer Maternal Uncle         stomach    No Known Problems Paternal Aunt     Diabetes Family     Colon cancer Neg Hx     Ovarian cancer Neg Hx       Social History     Tobacco Use    Smoking status: Former     Current packs/day: 0.00     Types: Cigarettes     Quit date:      Years since quittin.2    Smokeless tobacco: Never   Vaping Use    Vaping status: Never Used   Substance Use Topics    Alcohol use: Never    Drug use: Never      E-Cigarette/Vaping    E-Cigarette Use Never User       E-Cigarette/Vaping Substances      I have reviewed and agree with the history as documented.     HPI    Review of Systems        Objective       ED Triage Vitals [25]   Temperature Pulse Blood Pressure Respirations SpO2 Patient Position - Orthostatic VS   97.9 °F (36.6 °C) 55 111/75 18 95 % Sitting      Temp Source Heart Rate Source BP Location FiO2 (%) Pain Score    Oral Monitor Right arm -- 10 - Worst Possible Pain      Vitals      Date and Time Temp Pulse SpO2 Resp BP Pain Score FACES Pain Rating User   25 -- -- -- -- -- 8 -- [facility]   25 -- -- -- -- -- 8 -- [facility]   25 97.9 °F (36.6 °C) 55 95 % 18 111/75 10 - Worst Possible Pain -- LAP            Physical Exam    Results Reviewed       None            XR femur 2 views LEFT   ED Interpretation by Meeta Merrill MD ([id number])    No acute fracture or dislocation.      XR pelvis ap only 1 or 2 vw   ED Interpretation by Meeta Merrill MD ([id number])   No acute fracture or dislocation.  Formal read is pending.          Procedures    ED Medication and Procedure Management   Prior to Admission Medications   Prescriptions Last Dose Informant Patient Reported? Taking?   AYR SALINE NASAL NO-DRIP NA  Self Yes No   Si spray into each nostril daily at bedtime   Advair -21 MCG/ACT inhaler   Yes No   Sig: Inhale 2 puffs 2 (two) times a day   Multiple Vitamin (multivitamin) tablet   Yes No   Sig: Take 1 tablet by mouth daily   albuterol (2.5 mg/3 mL) 0.083 % nebulizer solution   Yes No   albuterol (VENTOLIN HFA) 90 mcg/act inhaler  Self No No   Sig: Inhale 2 puffs every 4 (four) hours as needed for wheezing   cetirizine (ZyrTEC) 10 mg tablet  Self Yes No   Sig: Take 10 mg by mouth daily     fluticasone (FLONASE) 50 mcg/act nasal spray  Self No No   Si sprays into each nostril daily   levothyroxine 125 mcg tablet   No No   Sig: TAKE 1 TAB BY MOUTH 6 TIMES WEEKLY ON EMPTY STOMACH   lidocaine (Lidoderm) 5 %   No No   Sig: Apply 1 patch topically over 12 hours daily Remove & Discard patch within 12 hours or as directed by MD   ondansetron (ZOFRAN-ODT) 4 mg disintegrating tablet   No No   Sig: Take 1 tablet (4 mg total) by mouth every 6 (six) hours as needed for nausea   Patient not taking: Reported on 3/27/2023   vitamin B-12 (CYANOCOBALAMIN) 500 MCG TABS   No No   Sig: Take 1 tablet (500 mcg total) by mouth daily      Facility-Administered Medications: None     Patient's Medications   Discharge Prescriptions    ACETAMINOPHEN (TYLENOL) 325 MG TABLET    Take 2 tablets (650 mg total) by mouth every 6 (six) hours as needed for mild pain or fever       Start Date: 3/29/2025 End Date: --       Order Dose: 650 mg       Quantity: 30 tablet    Refills: 0    OXYCODONE (ROXICODONE) 5 IMMEDIATE RELEASE TABLET    Take 1 tablet (5 mg total) by mouth  every 6 (six) hours as needed for severe pain for up to 3 days Max Daily Amount: 20 mg       Start Date: 3/29/2025 End Date: 4/1/2025       Order Dose: 5 mg       Quantity: 10 tablet    Refills: 0       ED SEPSIS DOCUMENTATION   Time reflects when diagnosis was documented in both MDM as applicable and the Disposition within this note       Time User Action Codes Description Comment    3/29/2025 10:04 PM Meeta Merrill [W19.XXXA] Fall, initial encounter     3/29/2025 10:04 PM Meeta Merrill [S76.012A] Hip strain, left, initial encounter                  Meeta Merrill MD  03/29/25 [id number]

## 2025-03-30 NOTE — DISCHARGE INSTRUCTIONS
Your x-rays reveal no evidence of a fracture today.  You likely have strained one of the muscles of your hip.  Please take Tylenol for pain as needed.  Use crutches, cane, or walker to help with ambulation.  Take oxycodone for severe breakthrough pain only.  Please follow-up with orthopedics.

## 2025-03-30 NOTE — ED PROVIDER NOTES
Time reflects when diagnosis was documented in both MDM as applicable and the Disposition within this note       Time User Action Codes Description Comment    3/29/2025 10:04 PM Meeta Merrill Add [W19.XXXA] Fall, initial encounter     3/29/2025 10:04 PM Meeta Merrill Add [S76.012A] Hip strain, left, initial encounter           ED Disposition       ED Disposition   Discharge    Condition   Stable    Date/Time   Sat Mar 29, 2025 10:04 PM    Comment   Wilda Mejía discharge to home/self care.                   Assessment & Plan       Medical Decision Making  Amount and/or Complexity of Data Reviewed  Radiology: ordered and independent interpretation performed.    Risk  OTC drugs.  Prescription drug management.             Medications   oxyCODONE-acetaminophen (PERCOCET) 5-325 mg per tablet 1 tablet (1 tablet Oral Given 3/29/25 2030)       ED Risk Strat Scores                            SBIRT 22yo+      Flowsheet Row Most Recent Value   Initial Alcohol Screen: US AUDIT-C     1. How often do you have a drink containing alcohol? 0 Filed at: 03/29/2025 1957   2. How many drinks containing alcohol do you have on a typical day you are drinking?  0 Filed at: 03/29/2025 1957   3a. Male UNDER 65: How often do you have five or more drinks on one occasion? 0 Filed at: 03/29/2025 1957   3b. FEMALE Any Age, or MALE 65+: How often do you have 4 or more drinks on one occassion? 0 Filed at: 03/29/2025 1957   Audit-C Score 0 Filed at: 03/29/2025 1957   ALANA: How many times in the past year have you...    Used an illegal drug or used a prescription medication for non-medical reasons? Never Filed at: 03/29/2025 1957                            History of Present Illness       Chief Complaint   Patient presents with    Fall     Reports fell down 3 steps denies head injury, loc and use of thinner c/o left thigh pain        Past Medical History:   Diagnosis Date    Asthma     Colon polyp     Fibroid     Hypothyroidism     Idiopathic  thrombocytopenic purpura (ITP) ([facility]) 1998    req'd splenectomy      Past Surgical History:   Procedure Laterality Date    SPLENECTOMY  1998    TONSILECTOMY AND ADNOIDECTOMY  1970    TUBAL LIGATION Bilateral       Family History   Problem Relation Age of Onset    Hypertension Mother     Heart attack Mother     Breast cancer Sister 48    No Known Problems Daughter     No Known Problems Maternal Grandmother     No Known Problems Maternal Grandfather     No Known Problems Paternal Grandmother     No Known Problems Paternal Grandfather     Breast cancer Maternal Aunt 60    Cancer Maternal Uncle         melanoma    Cancer Maternal Uncle         stomach    No Known Problems Paternal Aunt     Diabetes Family     Colon cancer Neg Hx     Ovarian cancer Neg Hx       Social History     Tobacco Use    Smoking status: Former     Current packs/day: 0.00     Types: Cigarettes     Quit date:      Years since quittin.2    Smokeless tobacco: Never   Vaping Use    Vaping status: Never Used   Substance Use Topics    Alcohol use: Never    Drug use: Never      E-Cigarette/Vaping    E-Cigarette Use Never User       E-Cigarette/Vaping Substances      I have reviewed and agree with the history as documented.     HPI    Review of Systems        Objective       ED Triage Vitals [25]   Temperature Pulse Blood Pressure Respirations SpO2 Patient Position - Orthostatic VS   97.9 °F (36.6 °C) 55 111/75 18 95 % Sitting      Temp Source Heart Rate Source BP Location FiO2 (%) Pain Score    Oral Monitor Right arm -- 10 - Worst Possible Pain      Vitals      Date and Time Temp Pulse SpO2 Resp BP Pain Score FACES Pain Rating User   25 -- -- -- -- -- 8 -- [facility]   25 -- -- -- -- -- 8 -- [facility]   25 97.9 °F (36.6 °C) 55 95 % 18 111/75 10 - Worst Possible Pain -- [facility]            Physical Exam    Results Reviewed       None            XR femur 2 views LEFT   ED Interpretation by Meeta Merrill MD ([id number])    No acute fracture or dislocation.      XR pelvis ap only 1 or 2 vw   ED Interpretation by Meeta Merrill MD ([id number])   No acute fracture or dislocation.  Formal read is pending.          Procedures    ED Medication and Procedure Management   Prior to Admission Medications   Prescriptions Last Dose Informant Patient Reported? Taking?   AYR SALINE NASAL NO-DRIP NA  Self Yes No   Si spray into each nostril daily at bedtime   Advair -21 MCG/ACT inhaler   Yes No   Sig: Inhale 2 puffs 2 (two) times a day   Multiple Vitamin (multivitamin) tablet   Yes No   Sig: Take 1 tablet by mouth daily   albuterol (2.5 mg/3 mL) 0.083 % nebulizer solution   Yes No   albuterol (VENTOLIN HFA) 90 mcg/act inhaler  Self No No   Sig: Inhale 2 puffs every 4 (four) hours as needed for wheezing   cetirizine (ZyrTEC) 10 mg tablet  Self Yes No   Sig: Take 10 mg by mouth daily     fluticasone (FLONASE) 50 mcg/act nasal spray  Self No No   Si sprays into each nostril daily   levothyroxine 125 mcg tablet   No No   Sig: TAKE 1 TAB BY MOUTH 6 TIMES WEEKLY ON EMPTY STOMACH   lidocaine (Lidoderm) 5 %   No No   Sig: Apply 1 patch topically over 12 hours daily Remove & Discard patch within 12 hours or as directed by MD   ondansetron (ZOFRAN-ODT) 4 mg disintegrating tablet   No No   Sig: Take 1 tablet (4 mg total) by mouth every 6 (six) hours as needed for nausea   Patient not taking: Reported on 3/27/2023   vitamin B-12 (CYANOCOBALAMIN) 500 MCG TABS   No No   Sig: Take 1 tablet (500 mcg total) by mouth daily      Facility-Administered Medications: None     Patient's Medications   Discharge Prescriptions    ACETAMINOPHEN (TYLENOL) 325 MG TABLET    Take 2 tablets (650 mg total) by mouth every 6 (six) hours as needed for mild pain or fever       Start Date: 3/29/2025 End Date: --       Order Dose: 650 mg       Quantity: 30 tablet    Refills: 0    OXYCODONE (ROXICODONE) 5 IMMEDIATE RELEASE TABLET    Take 1 tablet (5 mg total) by mouth  every 6 (six) hours as needed for severe pain for up to 3 days Max Daily Amount: 20 mg       Start Date: 3/29/2025 End Date: 4/1/2025       Order Dose: 5 mg       Quantity: 10 tablet    Refills: 0       ED SEPSIS DOCUMENTATION   Time reflects when diagnosis was documented in both MDM as applicable and the Disposition within this note       Time User Action Codes Description Comment    3/29/2025 10:04 PM Meeta Merrill [W19.XXXA] Fall, initial encounter     3/29/2025 10:04 PM Meeta Merrill [S76.012A] Hip strain, left, initial encounter                  Meeta Merrill MD  03/29/25 8903      Yes No   Sig: Inhale 2 puffs 2 (two) times a day   Multiple Vitamin (multivitamin) tablet   Yes No   Sig: Take 1 tablet by mouth daily   albuterol (2.5 mg/3 mL) 0.083 % nebulizer solution   Yes No   albuterol (VENTOLIN HFA) 90 mcg/act inhaler  Self No No   Sig: Inhale 2 puffs every 4 (four) hours as needed for wheezing   cetirizine (ZyrTEC) 10 mg tablet  Self Yes No   Sig: Take 10 mg by mouth daily     fluticasone (FLONASE) 50 mcg/act nasal spray  Self No No   Si sprays into each nostril daily   levothyroxine 125 mcg tablet   No No   Sig: TAKE 1 TAB BY MOUTH 6 TIMES WEEKLY ON EMPTY STOMACH   lidocaine (Lidoderm) 5 %   No No   Sig: Apply 1 patch topically over 12 hours daily Remove & Discard patch within 12 hours or as directed by MD   ondansetron (ZOFRAN-ODT) 4 mg disintegrating tablet   No No   Sig: Take 1 tablet (4 mg total) by mouth every 6 (six) hours as needed for nausea   Patient not taking: Reported on 3/27/2023   vitamin B-12 (CYANOCOBALAMIN) 500 MCG TABS   No No   Sig: Take 1 tablet (500 mcg total) by mouth daily      Facility-Administered Medications: None     Patient's Medications   Discharge Prescriptions    ACETAMINOPHEN (TYLENOL) 325 MG TABLET    Take 2 tablets (650 mg total) by mouth every 6 (six) hours as needed for mild pain or fever       Start Date: 3/29/2025 End Date: --       Order Dose: 650 mg       Quantity: 30 tablet    Refills: 0    OXYCODONE (ROXICODONE) 5 IMMEDIATE RELEASE TABLET    Take 1 tablet (5 mg total) by mouth every 6 (six) hours as needed for severe pain for up to 3 days Max Daily Amount: 20 mg       Start Date: 3/29/2025 End Date: 2025       Order Dose: 5 mg       Quantity: 10 tablet    Refills: 0       ED SEPSIS DOCUMENTATION   Time reflects when diagnosis was documented in both MDM as applicable and the Disposition within this note       Time User Action Codes Description Comment    3/29/2025 10:04 PM Meeta Merrill [W19.XXXA] Fall, initial encounter     3/29/2025  10:04 PM Meeta Merrill Add [S76.012A] Hip strain, left, initial encounter                  Meeta Merrill MD  03/29/25 5566       Meeta Merrill MD  04/07/25 0201

## 2025-04-01 ENCOUNTER — OFFICE VISIT (OUTPATIENT)
Dept: OBGYN CLINIC | Facility: MEDICAL CENTER | Age: 60
End: 2025-04-01
Payer: COMMERCIAL

## 2025-04-01 VITALS — BODY MASS INDEX: 24.94 KG/M2 | WEIGHT: 127 LBS | HEIGHT: 60 IN

## 2025-04-01 DIAGNOSIS — S76.112A QUADRICEPS STRAIN, LEFT, INITIAL ENCOUNTER: ICD-10-CM

## 2025-04-01 PROCEDURE — 99203 OFFICE O/P NEW LOW 30 MIN: CPT | Performed by: EMERGENCY MEDICINE

## 2025-04-01 NOTE — LETTER
April 1, 2025     Patient: Wilda Mejía  YOB: 1965  Date of Visit: 4/1/2025      To Whom it May Concern:    Wilda Mejía is under my professional care. Wilda was seen in my office on 4/1/2025.  Work excuse until Monday 4/7/25 when she may return to work with no restrictions.      If you have any questions or concerns, please don't hesitate to call.         Sincerely,          French Lan MD        CC: No Recipients

## 2025-04-01 NOTE — PROGRESS NOTES
Assessment/Plan:    Diagnoses and all orders for this visit:    Quadriceps strain, left, initial encounter  -     Ambulatory Referral to Orthopedic Surgery  -     Ambulatory Referral to Physical Therapy; Future    Reviewed ED note and Xrays, provided referral to PT and ACE bandage for support.  At this time no concern for tendon involvement.    Work excuse note with tentative return to work Monday 4/7.      Return in about 6 weeks (around 5/13/2025).      Subjective:   Patient ID: Wilda Mejía is a 59 y.o. female.    NP presents for a trip and slip on steps sliding down holding onto the railing and felt a pop of the anterior thigh.  Her pain is mid thigh, denies bruising, Evaluated in ED with Xrays pelvis and femur.  She has been utilizing cane.  Works as         Review of Systems    The following portions of the patient's chart were reviewed and updated as appropriate:   Allergy:    Allergies   Allergen Reactions    Ibuprofen Anaphylaxis, Edema and Hives    Grass Extracts [Gramineae Pollens]     Molds & Smuts     Ragwitek [Short Ragweed Pollen Ext]     Aspirin Hives       Medications:    Current Outpatient Medications:     acetaminophen (TYLENOL) 325 mg tablet, Take 2 tablets (650 mg total) by mouth every 6 (six) hours as needed for mild pain or fever, Disp: 30 tablet, Rfl: 0    Advair -21 MCG/ACT inhaler, Inhale 2 puffs 2 (two) times a day, Disp: , Rfl:     albuterol (2.5 mg/3 mL) 0.083 % nebulizer solution, , Disp: , Rfl:     albuterol (VENTOLIN HFA) 90 mcg/act inhaler, Inhale 2 puffs every 4 (four) hours as needed for wheezing, Disp: 1 Inhaler, Rfl: 0    AYR SALINE NASAL NO-DRIP NA, 1 spray into each nostril daily at bedtime, Disp: , Rfl:     cetirizine (ZyrTEC) 10 mg tablet, Take 10 mg by mouth daily  , Disp: , Rfl:     fluticasone (FLONASE) 50 mcg/act nasal spray, 2 sprays into each nostril daily, Disp: 16 g, Rfl: 11    levothyroxine 125 mcg tablet, TAKE 1 TAB BY MOUTH 6 TIMES  WEEKLY ON EMPTY STOMACH, Disp: 78 tablet, Rfl: 1    lidocaine (Lidoderm) 5 %, Apply 1 patch topically over 12 hours daily Remove & Discard patch within 12 hours or as directed by MD, Disp: 10 patch, Rfl: 0    Multiple Vitamin (multivitamin) tablet, Take 1 tablet by mouth daily, Disp: , Rfl:     oxyCODONE (Roxicodone) 5 immediate release tablet, Take 1 tablet (5 mg total) by mouth every 6 (six) hours as needed for severe pain for up to 3 days Max Daily Amount: 20 mg, Disp: 10 tablet, Rfl: 0    vitamin B-12 (CYANOCOBALAMIN) 500 MCG TABS, Take 1 tablet (500 mcg total) by mouth daily, Disp:  , Rfl:     ondansetron (ZOFRAN-ODT) 4 mg disintegrating tablet, Take 1 tablet (4 mg total) by mouth every 6 (six) hours as needed for nausea (Patient not taking: Reported on 4/1/2025), Disp: 20 tablet, Rfl: 0    Patient Active Problem List   Diagnosis    Allergic rhinitis    Moderate persistent asthma without complication    Acquired hypothyroidism    History of ITP    Former smoker    Hypercholesterolemia    H/O splenectomy ( ITP)    Weight loss, abnormal       Objective:  Ht 5' (1.524 m)   Wt 57.6 kg (127 lb)   BMI 24.80 kg/m²     Left Knee Exam     Comments:  Mid thigh pain is reproduced with flexion of the hip as well as extension of the knee.  Patient is able to extend the knee against gravity.  The distal quadriceps muscle and quadriceps tendon are nontender to palpation.  The main area of tenderness to palpation is overlying the midportion of the quadriceps muscle.  Patient has mild symptoms with passive internal and external rotation of the hip.            Physical Exam      Neurologic Exam    Procedures    I have personally reviewed the written report of the pertinent studies.   XR PELVIS AP ONLY 1 OR 2 VW, XR FEMUR 2 VW LEFT     INDICATION: Fall down 3 steps, left anterior hip/thigh pain.     COMPARISON: CT abdomen pelvis 8/26/2024     FINDINGS:     PELVIS:     No acute fracture or dislocation.     No lytic or blastic  osseous lesion.     Atherosclerotic calcifications. Oval soft tissue density projecting over the left hemipelvis measuring 2.7 cm presumably corresponds with a partially calcified uterine fibroid, better visualized on CT 2024.        FEMUR:     No acute fracture or dislocation.     Mild degenerative changes of the bilateral hips.     No lytic or blastic osseous lesion.     Unremarkable soft tissues.        IMPRESSION:     No acute osseous abnormality.     Degenerative changes as described.            Past Medical History:   Diagnosis Date    Asthma     Colon polyp     Fibroid     Hypothyroidism     Idiopathic thrombocytopenic purpura (ITP) ([facility])     req'd splenectomy       Past Surgical History:   Procedure Laterality Date    SPLENECTOMY  1998    TONSILECTOMY AND ADNOIDECTOMY  1970    TUBAL LIGATION Bilateral        Social History     Socioeconomic History    Marital status: Single     Spouse name: Not on file    Number of children: Not on file    Years of education: Not on file    Highest education level: Not on file   Occupational History    Not on file   Tobacco Use    Smoking status: Former     Current packs/day: 0.00     Types: Cigarettes     Quit date:      Years since quittin.2    Smokeless tobacco: Never   Vaping Use    Vaping status: Never Used   Substance and Sexual Activity    Alcohol use: Never    Drug use: Never    Sexual activity: Yes     Partners: Male     Birth control/protection: Female Sterilization, Post-menopausal   Other Topics Concern    Not on file   Social History Narrative    EXERCISE: WALKING     Social Drivers of Health     Financial Resource Strain: Medium Risk (2023)    [facility]    Overall Financial Resource Strain (CARDIA)     Difficulty of Paying Living Expenses: Somewhat hard   Food Insecurity: No Food Insecurity (2023)    Received from [facility], [facility]    Hunger Vital Sign     Worried About Running Out of Food in the Last Year: Never true     Ran Out of Food in the Last Year: Never true   Transportation Needs: No Transportation Needs (7/14/2023)    Received from [facility], [facility]    PRAPARE - Transportation     Lack of Transportation (Medical): No     Lack of Transportation (Non-Medical): No   Physical Activity: Unknown (7/14/2023)    Received from [facility]    Exercise Vital Sign     Days of Exercise per Week: 0 days     Minutes of Exercise per Session: Not on file   Stress: No Stress Concern Present (7/14/2023)    Received from [facility], [facility]    [employer] [place] [employer] - Occupational Stress Questionnaire     Feeling of Stress : Not at all   Social Connections: Moderately Isolated (7/14/2023)    Received from [facility], [facility]    Social Connection and Isolation Panel [NHANES]     Frequency of Communication with Friends and Family: More than three times a week     Frequency of Social Gatherings with Friends and Family: Not on file     Attends Mandaeism Services: Never     Active Member of Clubs or Organizations: No     Attends Club or Organization Meetings: Never     Marital Status: Living with partner   Intimate Partner Violence: Not At Risk (7/14/2023)    Received from [facility], [facility]    Humiliation, Afraid, Rape, and Kick questionnaire     Fear of Current or Ex-Partner: No     Emotionally Abused: No     Physically Abused: No     Sexually Abused: No   Housing Stability: Unknown (7/14/2023)    Received from [facility], [facility]    Housing Stability Vital Sign     Unable to Pay for Housing in the Last Year: No     Number of Places Lived in the Last Year: Not on file     Unstable Housing in the Last Year: No        Family History   Problem Relation Age of Onset    Hypertension Mother     Heart attack Mother     Breast cancer Sister 48    No Known Problems Daughter     No Known Problems Maternal Grandmother     No Known Problems Maternal Grandfather     No Known Problems Paternal Grandmother     No Known Problems Paternal Grandfather     Breast cancer Maternal Aunt 60    Cancer Maternal Uncle         melanoma    Cancer Maternal Uncle         stomach    No Known Problems Paternal Aunt     Diabetes Family     Colon cancer Neg Hx     Ovarian cancer Neg Hx

## 2025-05-26 ENCOUNTER — APPOINTMENT (EMERGENCY)
Dept: RADIOLOGY | Facility: HOSPITAL | Age: 60
End: 2025-05-26
Payer: COMMERCIAL

## 2025-05-26 ENCOUNTER — HOSPITAL ENCOUNTER (EMERGENCY)
Facility: HOSPITAL | Age: 60
Discharge: HOME | End: 2025-05-26
Attending: EMERGENCY MEDICINE | Admitting: EMERGENCY MEDICINE
Payer: COMMERCIAL

## 2025-05-26 ENCOUNTER — APPOINTMENT (EMERGENCY)
Dept: CT IMAGING | Facility: HOSPITAL | Age: 60
End: 2025-05-26
Payer: COMMERCIAL

## 2025-05-26 VITALS
BODY MASS INDEX: 25.4 KG/M2 | HEART RATE: 77 BPM | DIASTOLIC BLOOD PRESSURE: 61 MMHG | OXYGEN SATURATION: 95 % | WEIGHT: 130.07 LBS | SYSTOLIC BLOOD PRESSURE: 117 MMHG | TEMPERATURE: 98.2 F | RESPIRATION RATE: 20 BRPM

## 2025-05-26 DIAGNOSIS — R07.9 CHEST PAIN: Primary | ICD-10-CM

## 2025-05-26 DIAGNOSIS — R11.0 NAUSEA: ICD-10-CM

## 2025-05-26 DIAGNOSIS — R61 DIAPHORESIS: ICD-10-CM

## 2025-05-26 DIAGNOSIS — R06.02 SOB (SHORTNESS OF BREATH): ICD-10-CM

## 2025-05-26 LAB
2HR DELTA HS TROPONIN: 0 NG/L
ALBUMIN SERPL BCG-MCNC: 4.5 G/DL (ref 3.5–5)
ALP SERPL-CCNC: 47 U/L (ref 34–104)
ALT SERPL W P-5'-P-CCNC: 9 U/L (ref 7–52)
ANION GAP SERPL CALCULATED.3IONS-SCNC: 6 MMOL/L (ref 4–13)
ANISOCYTOSIS BLD QL SMEAR: PRESENT
APTT PPP: 27 SECONDS (ref 23–34)
AST SERPL W P-5'-P-CCNC: 12 U/L (ref 13–39)
ATRIAL RATE: 61 BPM
ATRIAL RATE: 75 BPM
BASOPHILS # BLD MANUAL: 0.11 THOUSAND/UL (ref 0–0.1)
BASOPHILS NFR MAR MANUAL: 1 % (ref 0–1)
BILIRUB SERPL-MCNC: 0.41 MG/DL (ref 0.2–1)
BNP SERPL-MCNC: 71 PG/ML (ref 0–100)
BUN SERPL-MCNC: 20 MG/DL (ref 5–25)
BURR CELLS BLD QL SMEAR: PRESENT
CALCIUM SERPL-MCNC: 9.8 MG/DL (ref 8.4–10.2)
CARDIAC TROPONIN I PNL SERPL HS: 6 NG/L (ref ?–50)
CARDIAC TROPONIN I PNL SERPL HS: 6 NG/L (ref ?–50)
CHLORIDE SERPL-SCNC: 102 MMOL/L (ref 96–108)
CO2 SERPL-SCNC: 32 MMOL/L (ref 21–32)
CREAT SERPL-MCNC: 0.91 MG/DL (ref 0.6–1.3)
D DIMER PPP FEU-MCNC: 0.38 UG/ML FEU
EOSINOPHIL # BLD MANUAL: 0.22 THOUSAND/UL (ref 0–0.4)
EOSINOPHIL NFR BLD MANUAL: 2 % (ref 0–6)
ERYTHROCYTE [DISTWIDTH] IN BLOOD BY AUTOMATED COUNT: 15 % (ref 11.6–15.1)
GFR SERPL CREATININE-BSD FRML MDRD: 68 ML/MIN/1.73SQ M
GLUCOSE SERPL-MCNC: 105 MG/DL (ref 65–140)
HCT VFR BLD AUTO: 41.7 % (ref 34.8–46.1)
HGB BLD-MCNC: 14.4 G/DL (ref 11.5–15.4)
INR PPP: 0.94 (ref 0.85–1.19)
LYMPHOCYTES # BLD AUTO: 1.68 THOUSAND/UL (ref 0.6–4.47)
LYMPHOCYTES # BLD AUTO: 15 % (ref 14–44)
MACROCYTES BLD QL AUTO: PRESENT
MCH RBC QN AUTO: 32.5 PG (ref 26.8–34.3)
MCHC RBC AUTO-ENTMCNC: 34.5 G/DL (ref 31.4–37.4)
MCV RBC AUTO: 94 FL (ref 82–98)
MONOCYTES # BLD AUTO: 1.12 THOUSAND/UL (ref 0–1.22)
MONOCYTES NFR BLD: 10 % (ref 4–12)
NEUTROPHILS # BLD MANUAL: 8.06 THOUSAND/UL (ref 1.85–7.62)
NEUTS SEG NFR BLD AUTO: 72 % (ref 43–75)
P AXIS: 43 DEGREES
P AXIS: 70 DEGREES
PLATELET # BLD AUTO: 246 THOUSANDS/UL (ref 149–390)
PLATELET BLD QL SMEAR: ADEQUATE
PMV BLD AUTO: 11.8 FL (ref 8.9–12.7)
POIKILOCYTOSIS BLD QL SMEAR: PRESENT
POTASSIUM SERPL-SCNC: 3.8 MMOL/L (ref 3.5–5.3)
PR INTERVAL: 158 MS
PR INTERVAL: 210 MS
PROT SERPL-MCNC: 7.2 G/DL (ref 6.4–8.4)
PROTHROMBIN TIME: 12.8 SECONDS (ref 12.3–15)
QRS AXIS: 88 DEGREES
QRS AXIS: 93 DEGREES
QRSD INTERVAL: 76 MS
QRSD INTERVAL: 78 MS
QT INTERVAL: 378 MS
QT INTERVAL: 402 MS
QTC INTERVAL: 404 MS
QTC INTERVAL: 422 MS
RBC # BLD AUTO: 4.43 MILLION/UL (ref 3.81–5.12)
RBC MORPH BLD: PRESENT
SODIUM SERPL-SCNC: 140 MMOL/L (ref 135–147)
T WAVE AXIS: 32 DEGREES
T WAVE AXIS: 58 DEGREES
TARGETS BLD QL SMEAR: PRESENT
VENTRICULAR RATE: 61 BPM
VENTRICULAR RATE: 75 BPM
WBC # BLD AUTO: 11.2 THOUSAND/UL (ref 4.31–10.16)

## 2025-05-26 PROCEDURE — 83880 ASSAY OF NATRIURETIC PEPTIDE: CPT

## 2025-05-26 PROCEDURE — 93005 ELECTROCARDIOGRAM TRACING: CPT

## 2025-05-26 PROCEDURE — 96366 THER/PROPH/DIAG IV INF ADDON: CPT

## 2025-05-26 PROCEDURE — 71045 X-RAY EXAM CHEST 1 VIEW: CPT

## 2025-05-26 PROCEDURE — 36415 COLL VENOUS BLD VENIPUNCTURE: CPT

## 2025-05-26 PROCEDURE — 85730 THROMBOPLASTIN TIME PARTIAL: CPT

## 2025-05-26 PROCEDURE — 84484 ASSAY OF TROPONIN QUANT: CPT

## 2025-05-26 PROCEDURE — 80053 COMPREHEN METABOLIC PANEL: CPT

## 2025-05-26 PROCEDURE — 74174 CTA ABD&PLVS W/CONTRAST: CPT

## 2025-05-26 PROCEDURE — 96365 THER/PROPH/DIAG IV INF INIT: CPT

## 2025-05-26 PROCEDURE — 93010 ELECTROCARDIOGRAM REPORT: CPT | Performed by: INTERNAL MEDICINE

## 2025-05-26 PROCEDURE — 85007 BL SMEAR W/DIFF WBC COUNT: CPT

## 2025-05-26 PROCEDURE — 99285 EMERGENCY DEPT VISIT HI MDM: CPT

## 2025-05-26 PROCEDURE — 71275 CT ANGIOGRAPHY CHEST: CPT

## 2025-05-26 PROCEDURE — 85379 FIBRIN DEGRADATION QUANT: CPT

## 2025-05-26 PROCEDURE — 85610 PROTHROMBIN TIME: CPT

## 2025-05-26 PROCEDURE — 96375 TX/PRO/DX INJ NEW DRUG ADDON: CPT

## 2025-05-26 PROCEDURE — 85027 COMPLETE CBC AUTOMATED: CPT

## 2025-05-26 RX ORDER — ONDANSETRON 2 MG/ML
4 INJECTION INTRAMUSCULAR; INTRAVENOUS ONCE
Status: COMPLETED | OUTPATIENT
Start: 2025-05-26 | End: 2025-05-26

## 2025-05-26 RX ORDER — NITROGLYCERIN 0.4 MG/1
0.4 TABLET SUBLINGUAL ONCE
Status: COMPLETED | OUTPATIENT
Start: 2025-05-26 | End: 2025-05-26

## 2025-05-26 RX ORDER — FENTANYL CITRATE 50 UG/ML
50 INJECTION, SOLUTION INTRAMUSCULAR; INTRAVENOUS ONCE
Refills: 0 | Status: COMPLETED | OUTPATIENT
Start: 2025-05-26 | End: 2025-05-26

## 2025-05-26 RX ORDER — ONDANSETRON 4 MG/1
4 TABLET, ORALLY DISINTEGRATING ORAL EVERY 6 HOURS PRN
Qty: 12 TABLET | Refills: 0 | Status: SHIPPED | OUTPATIENT
Start: 2025-05-26

## 2025-05-26 RX ORDER — DIAZEPAM 5 MG/1
5 TABLET ORAL 2 TIMES DAILY
Qty: 10 TABLET | Refills: 0 | Status: SHIPPED | OUTPATIENT
Start: 2025-05-26 | End: 2025-06-05

## 2025-05-26 RX ADMIN — NITROGLYCERIN 0.4 MG: 0.4 TABLET SUBLINGUAL at 19:25

## 2025-05-26 RX ADMIN — SODIUM CHLORIDE, SODIUM LACTATE, POTASSIUM CHLORIDE, AND CALCIUM CHLORIDE 1000 ML: .6; .31; .03; .02 INJECTION, SOLUTION INTRAVENOUS at 19:45

## 2025-05-26 RX ADMIN — FENTANYL CITRATE 50 MCG: 50 INJECTION INTRAMUSCULAR; INTRAVENOUS at 19:55

## 2025-05-26 RX ADMIN — IOHEXOL 100 ML: 350 INJECTION, SOLUTION INTRAVENOUS at 20:13

## 2025-05-26 RX ADMIN — ONDANSETRON 4 MG: 2 INJECTION INTRAMUSCULAR; INTRAVENOUS at 19:26

## 2025-05-26 NOTE — Clinical Note
Wilda Mejía was seen and treated in our emergency department on 5/26/2025.                Diagnosis: Chest pain, difficulty breathing, nausea    Wilda  may return to work on return date.    She may return on this date: 05/28/2025         If you have any questions or concerns, please don't hesitate to call.      Kwame Armendariz PA-C    ______________________________           _______________          _______________  Hospital Representative                              Date                                Time

## 2025-05-27 NOTE — ED PROVIDER NOTES
Time reflects when diagnosis was documented in both MDM as applicable and the Disposition within this note       Time User Action Codes Description Comment    5/26/2025 11:40 PM Kwame Armendariz [R07.9] Chest pain     5/26/2025 11:40 PM Kwame Armendariz [R06.02] SOB (shortness of breath)     5/26/2025 11:40 PM Kwame Armendariz Add [R61] Diaphoresis     5/26/2025 11:42 PM Kwame Armendariz [R11.0] Nausea           ED Disposition       ED Disposition   Discharge    Condition   Stable    Date/Time   Mon May 26, 2025 11:40 PM    Comment   Wilda Mejía discharge to home/self care.                   Assessment & Plan       Medical Decision Making  59-year-old female presenting with midsternal chest pressure, difficulty breathing, nausea, and diaphoresis since earlier today.  Exam: Patient ill-appearing and appears to be significantly uncomfortable.  Hypertensive, at times mildly bradycardic in 40s to 50s bpm.  Saturating well on room air.  Mildly tachypneic.  Afebrile.  Mild Rales in bilateral lower lung fields.  No wheezing, rhonchi.  Heart RRR without appreciable murmur.  No lower extremity swelling.  Chest pain is worsened with palpation.    Point-of-care ECG with normal sinus and similar to prior, no evidence of ischemic changes, normal QTc.  Presentation overall concerning for ACS.  Will proceed with CBC, CMP, troponin, BNP, D-dimer, coags.  Chest x-ray was obtained without any remarkable findings.  Patient was given a dose of sublingual nitroglycerin for symptomatic relief as there was no inferior wall MI pattern on ECG.  Zofran for nausea since QTc was not prolonged.  Although markedly hypertensive on arrival in the /92, she quickly became hypotensive with systolic in the 70s to 80s, symmetric in both arms.  Was decided at this point to proceed with stat CTA dissection study.    Lab work all unremarkable.  Troponins not indicative of ACS but will need delta.  D-dimer not concerning for PE.  CTA dissection with  no acute pathologies.  Patient feeling much better following further medications.  Now pain is minimal, she is breathing normally, relatively comfortable in bed.  Vital signs have returned to normal, normotensive after IV fluids.  At this time there is no evidence of cardiopulmonary emergency.  Have a higher suspicion now for GI abnormality such as esophageal spasm.  Was given a short prescription of Valium to use as needed if symptoms recur.  She tolerating p.o. intake.  Given referral to cardiology as I think she would benefit from more formal workup.  Advised to return to the ER immediately if symptoms worsen.  Patient expresses understanding of the condition, treatment plan, follow-up instructions, and return precautions.      Amount and/or Complexity of Data Reviewed  Labs: ordered.  Radiology: ordered and independent interpretation performed.    Risk  Prescription drug management.             Medications   nitroglycerin (NITROSTAT) SL tablet 0.4 mg (0.4 mg Sublingual Given 5/26/25 1925)   ondansetron (ZOFRAN) injection 4 mg (4 mg Intravenous Given 5/26/25 1926)   lactated ringers bolus 1,000 mL (0 mL Intravenous Stopped 5/26/25 2120)   fentaNYL injection 50 mcg (50 mcg Intravenous Given 5/26/25 1955)   iohexol (OMNIPAQUE) 350 MG/ML injection (MULTI-DOSE) 100 mL (100 mL Intravenous Given 5/26/25 2013)       ED Risk Strat Scores   HEART Risk Score      Flowsheet Row Most Recent Value   Heart Score Risk Calculator    History 2 Filed at: 05/26/2025 2341   ECG 0 Filed at: 05/26/2025 2341   Age 1 Filed at: 05/26/2025 2341   Risk Factors 1 Filed at: 05/26/2025 2341   Troponin 0 Filed at: 05/26/2025 2341   HEART Score 4 Filed at: 05/26/2025 2341          HEART Risk Score      Flowsheet Row Most Recent Value   Heart Score Risk Calculator    History 2 Filed at: 05/26/2025 2341   ECG 0 Filed at: 05/26/2025 2341   Age 1 Filed at: 05/26/2025 2341   Risk Factors 1 Filed at: 05/26/2025 2341   Troponin 0 Filed at: 05/26/2025  "2341   HEART Score 4 Filed at: 05/26/2025 2341                      No data recorded        SBIRT 20yo+      Flowsheet Row Most Recent Value   Initial Alcohol Screen: US AUDIT-C     1. How often do you have a drink containing alcohol? 0 Filed at: 05/26/2025 2034   2. How many drinks containing alcohol do you have on a typical day you are drinking?  0 Filed at: 05/26/2025 2034   3b. FEMALE Any Age, or MALE 65+: How often do you have 4 or more drinks on one occassion? 0 Filed at: 05/26/2025 2034   Audit-C Score 0 Filed at: 05/26/2025 2034   ALANA: How many times in the past year have you...    Used an illegal drug or used a prescription medication for non-medical reasons? Never Filed at: 05/26/2025 2034                            History of Present Illness       Chief Complaint   Patient presents with    Chest Pain     States started with SOB this morning, worsening throughout the day. States L sided chest pain starting about 3 hrs prior to arrival. States radiating to L arm and back.        Past Medical History[1]   Past Surgical History[2]   Family History[3]   Social History[4]   E-Cigarette/Vaping    E-Cigarette Use Never User       E-Cigarette/Vaping Substances      I have reviewed and agree with the history as documented.     59-year-old female with PMH of asthma, hypothyroidism, ITP presenting for evaluation of chest pain since this afternoon.  States this started around 1 PM or possibly before, has been gradually worsening throughout the day.  Chest pain is midsternal and left parasternal, pressure-like, constant.  Feels like \"a Jermaine truck\" on her chest.  Feels like she is unable to breathe.  Her chest pain does radiate to her left arm, midline thoracic region of back, and somewhat to her left neck.  She endorses nausea and feels sweaty.  Denies cardiac history such as HTN, diabetes, CAD, arrhythmia.  Family history of cardiac disease, states her mother passed away a few years ago from an MI in her " 80s.        Review of Systems   Constitutional:  Positive for diaphoresis. Negative for chills and fever.   HENT:  Negative for ear pain and sore throat.    Eyes:  Negative for pain and visual disturbance.   Respiratory:  Positive for chest tightness and shortness of breath. Negative for cough.    Cardiovascular:  Positive for chest pain. Negative for palpitations.   Gastrointestinal:  Positive for nausea. Negative for abdominal pain and vomiting.   Genitourinary:  Negative for dysuria and hematuria.   Musculoskeletal:  Negative for arthralgias and back pain.   Skin:  Negative for color change and rash.   Neurological:  Negative for seizures and syncope.   All other systems reviewed and are negative.          Objective       ED Triage Vitals   Temperature Pulse Blood Pressure Respirations SpO2 Patient Position - Orthostatic VS   05/26/25 1909 05/26/25 1909 05/26/25 1909 05/26/25 1909 05/26/25 1909 05/26/25 1909   98.2 °F (36.8 °C) 63 (!) 175/92 20 100 % Lying      Temp Source Heart Rate Source BP Location FiO2 (%) Pain Score    05/26/25 1909 05/26/25 1909 05/26/25 1909 -- 05/26/25 1955    Oral Monitor Left arm  9      Vitals      Date and Time Temp Pulse SpO2 Resp BP Pain Score FACES Pain Rating User   05/26/25 2330 -- 77 95 % 20 117/61 -- --    05/26/25 2300 -- 79 96 % 20 121/63 -- --    05/26/25 2230 -- 76 96 % 20 132/68 -- --    05/26/25 2200 -- 83 95 % 20 119/66 -- --    05/26/25 2130 -- 84 97 % 20 118/57 -- --    05/26/25 2128 -- -- -- -- -- 5 --    05/26/25 2115 -- 73 96 % 20 119/69 -- --    05/26/25 2100 -- 69 96 % 22 116/68 -- --    05/26/25 2045 -- 66 97 % 22 129/66 -- --    05/26/25 2032 -- -- -- -- -- 8 --    05/26/25 2030 -- 70 95 % 22 108/66 -- --    05/26/25 2015 -- 66 97 % 22 136/76 -- --    05/26/25 2000 -- 56 96 % 22 111/70 -- --    05/26/25 1958 -- 52 98 % 22 114/68 -- --    05/26/25 1955 -- -- -- -- -- 9 --    05/26/25 1950 -- 51 96 % 22 88/55 -- --    05/26/25 1945  -- 48 96 % 22 81/52 -- --    05/26/25 1944 -- -- -- -- 76/42 -- --    05/26/25 1943 -- -- -- -- 78/42 -- --    05/26/25 1909 98.2 °F (36.8 °C) 63 100 % 20 175/92 -- --             Physical Exam  Vitals and nursing note reviewed.   Constitutional:       General: She is in acute distress.      Appearance: She is well-developed. She is ill-appearing and diaphoretic.   HENT:      Head: Normocephalic and atraumatic.     Eyes:      Conjunctiva/sclera: Conjunctivae normal.       Cardiovascular:      Rate and Rhythm: Normal rate and regular rhythm.      Heart sounds: No murmur heard.  Pulmonary:      Effort: Pulmonary effort is normal. Tachypnea present. No respiratory distress.      Breath sounds: Normal breath sounds. No wheezing, rhonchi or rales.   Chest:      Chest wall: Tenderness (Chest pain worsened with pressure applied) present.   Abdominal:      Palpations: Abdomen is soft.      Tenderness: There is no abdominal tenderness.     Musculoskeletal:         General: No swelling.      Cervical back: Neck supple.     Skin:     General: Skin is warm.      Capillary Refill: Capillary refill takes less than 2 seconds.     Neurological:      Mental Status: She is alert.     Psychiatric:         Mood and Affect: Mood normal.         Results Reviewed       Procedure Component Value Units Date/Time    HS Troponin I 2hr [[id number]]  (Normal) Collected: 05/26/25 2131    Lab Status: Final result Specimen: Blood from Arm, Right Updated: 05/26/25 2159     hs TnI 2hr 6 ng/L      Delta 2hr hsTnI 0 ng/L     Manual Differential(PHLEBS Do Not Order) [[id number]]  (Abnormal) Collected: 05/26/25 1923    Lab Status: Final result Specimen: Blood from Arm, Right Updated: 05/26/25 2027     Segmented % 72 %      Lymphocytes % 15 %      Monocytes % 10 %      Eosinophils % 2 %      Basophils % 1 %      Absolute Neutrophils 8.06 Thousand/uL      Absolute Lymphocytes 1.68 Thousand/uL      Absolute Monocytes 1.12 Thousand/uL      Absolute  Eosinophils 0.22 Thousand/uL      Absolute Basophils 0.11 Thousand/uL      Total Counted --     RBC Morphology Present     Platelet Estimate Adequate     Anisocytosis Present     [place] Cells Present     Macrocytes Present     Poikilocytes Present     Target Cells Present    HS Troponin 0hr (reflex protocol) [[id number]]  (Normal) Collected: 05/26/25 1923    Lab Status: Final result Specimen: Blood from Arm, Right Updated: 05/26/25 1950     hs TnI 0hr 6 ng/L     B-Type Natriuretic Peptide(BNP) [[id number]]  (Normal) Collected: 05/26/25 1923    Lab Status: Final result Specimen: Blood from Arm, Right Updated: 05/26/25 1949     BNP 71 pg/mL     D-Dimer [[id number]]  (Normal) Collected: 05/26/25 1923    Lab Status: Final result Specimen: Blood from Arm, Right Updated: 05/26/25 1944     D-Dimer, Quant 0.38 ug/ml FEU     Narrative:      In the evaluation for possible pulmonary embolism, in the appropriate (Well's Score of 4 or less) patient, the age adjusted d-dimer cutoff for this patient can be calculated as:    Age x 0.01 (in ug/mL) for Age-adjusted D-dimer exclusion threshold for a patient over 50 years.    Comprehensive metabolic panel [[id number]]  (Abnormal) Collected: 05/26/25 1923    Lab Status: Final result Specimen: Blood from Arm, Right Updated: 05/26/25 1943     Sodium 140 mmol/L      Potassium 3.8 mmol/L      Chloride 102 mmol/L      CO2 32 mmol/L      ANION GAP 6 mmol/L      BUN 20 mg/dL      Creatinine 0.91 mg/dL      Glucose 105 mg/dL      Calcium 9.8 mg/dL      AST 12 U/L      ALT 9 U/L      Alkaline Phosphatase 47 U/L      Total Protein 7.2 g/dL      Albumin 4.5 g/dL      Total Bilirubin 0.41 mg/dL      eGFR 68 ml/min/1.73sq m     Narrative:      National Kid[employer] guidelines for Chronic Kidney Disease (CKD):     Stage 1 with normal or high GFR (GFR > 90 mL/min/1.73 square meters)    Stage 2 Mild CKD (GFR = 60-89 mL/min/1.73 square meters)    Stage 3A Moderate CKD (GFR = 45-59 mL/min/1.73  square meters)    Stage 3B Moderate CKD (GFR = 30-44 mL/min/1.73 square meters)    Stage 4 Severe CKD (GFR = 15-29 mL/min/1.73 square meters)    Stage 5 End Stage CKD (GFR <15 mL/min/1.73 square meters)  Note: GFR calculation is accurate only with a steady state creatinine    Protime-INR [[id number]]  (Normal) Collected: 05/26/25 1923    Lab Status: Final result Specimen: Blood from Arm, Right Updated: 05/26/25 1942     Protime 12.8 seconds      INR 0.94    Narrative:      INR Therapeutic Range    Indication                                             INR Range      Atrial Fibrillation                                               2.0-3.0  Hypercoagulable State                                    2.0.2.3  Left Ventricular Asist Device                            2.0-3.0  Mechanical Heart Valve                                  -    Aortic(with afib, MI, embolism, HF, LA enlargement,    and/or coagulopathy)                                     2.0-3.0 (2.5-3.5)     Mitral                                                             2.5-3.5  Prosthetic/Bioprosthetic Heart Valve               2.0-3.0  Venous thromboembolism (VTE: VT, PE        2.0-3.0    APTT [[id number]]  (Normal) Collected: 05/26/25 1923    Lab Status: Final result Specimen: Blood from Arm, Right Updated: 05/26/25 1942     PTT 27 seconds     CBC and differential [[id number]]  (Abnormal) Collected: 05/26/25 1923    Lab Status: Final result Specimen: Blood from Arm, Right Updated: 05/26/25 1935     WBC 11.20 Thousand/uL      RBC 4.43 Million/uL      Hemoglobin 14.4 g/dL      Hematocrit 41.7 %      MCV 94 fL      MCH 32.5 pg      MCHC 34.5 g/dL      RDW 15.0 %      MPV 11.8 fL      Platelets 246 Thousands/uL     Narrative:      This is an appended report.  These results have been appended to a previously verified report.            XR chest 1 view portable   ED Interpretation by Kwame Armendariz PA-C (05/26 2046)   ED wet read: no acute cardiopulmonary  process.      Final Interpretation by Omar Goyal MD ([id number])      No acute cardiopulmonary disease.            Workstation performed: [id number]         CTA dissection protocol chest/abdomen/pelvis   ED Interpretation by Kwame Armendariz PA-C ( [id number])   [facility] report:     IMPRESSION: 1. Fibroid uterus. 2. Atherosclerotic aorta. No aneurysm or acute aortic syndrome.        Final Interpretation by Omar Goyal MD ([id number])      No acute aortic, thoracic or abdominopelvic pathology.            Computerized Assisted Algorithm (CAA) may have aided analysis of applicable images.         Workstation performed: [id number]             ECG 12 Lead Documentation Only    Date/Time: 2025 7:15 PM    Performed by: Kwame Armendariz PA-C  Authorized by: Kwame Armendariz PA-C    Indications / Diagnosis:  CP  ECG reviewed by me, the ED Provider: yes    Patient location:  ED  Previous ECG:     Previous ECG:  Compared to current    Comparison ECG info:  Normal sinus    Similarity:  No change    Comparison to cardiac monitor: Yes    Interpretation:     Interpretation: normal    Rate:     ECG rate:  61    ECG rate assessment: normal    Rhythm:     Rhythm: sinus rhythm    Ectopy:     Ectopy: none    QRS:     QRS axis:  Normal    QRS intervals:  Normal  Conduction:     Conduction: normal    ST segments:     ST segments:  Normal  T waves:     T waves: normal        ED Medication and Procedure Management   Prior to Admission Medications   Prescriptions Last Dose Informant Patient Reported? Taking?   AYR SALINE NASAL NO-DRIP NA  Self Yes No   Si spray into each nostril daily at bedtime   Advair -21 MCG/ACT inhaler   Yes No   Sig: Inhale 2 puffs 2 (two) times a day   Multiple Vitamin (multivitamin) tablet   Yes No   Sig: Take 1 tablet by mouth daily   acetaminophen (TYLENOL) 325 mg tablet   No No   Sig: Take 2 tablets (650 mg total) by mouth every 6 (six) hours as needed for mild pain or fever    albuterol (2.5 mg/3 mL) 0.083 % nebulizer solution   Yes No   albuterol (VENTOLIN HFA) 90 mcg/act inhaler  Self No No   Sig: Inhale 2 puffs every 4 (four) hours as needed for wheezing   cetirizine (ZyrTEC) 10 mg tablet  Self Yes No   Sig: Take 10 mg by mouth daily     fluticasone (FLONASE) 50 mcg/act nasal spray  Self No No   Si sprays into each nostril daily   levothyroxine 125 mcg tablet   No No   Sig: TAKE 1 TAB BY MOUTH 6 TIMES WEEKLY ON EMPTY STOMACH   lidocaine (Lidoderm) 5 %   No No   Sig: Apply 1 patch topically over 12 hours daily Remove & Discard patch within 12 hours or as directed by MD   ondansetron (ZOFRAN-ODT) 4 mg disintegrating tablet   No No   Sig: Take 1 tablet (4 mg total) by mouth every 6 (six) hours as needed for nausea   Patient not taking: Reported on 2025   vitamin B-12 (CYANOCOBALAMIN) 500 MCG TABS   No No   Sig: Take 1 tablet (500 mcg total) by mouth daily      Facility-Administered Medications: None     Discharge Medication List as of 2025 11:43 PM        START taking these medications    Details   diazepam (VALIUM) 5 mg tablet Take 1 tablet (5 mg total) by mouth 2 (two) times a day for 10 days, Starting Mon 2025, Until Thu 2025, Normal      !! ondansetron (ZOFRAN-ODT) 4 mg disintegrating tablet Take 1 tablet (4 mg total) by mouth every 6 (six) hours as needed for nausea or vomiting, Starting Mon 2025, Normal       !! - Potential duplicate medications found. Please discuss with provider.        CONTINUE these medications which have NOT CHANGED    Details   acetaminophen (TYLENOL) 325 mg tablet Take 2 tablets (650 mg total) by mouth every 6 (six) hours as needed for mild pain or fever, Starting Sat 3/29/2025, Normal      Advair -21 MCG/ACT inhaler Inhale 2 puffs 2 (two) times a day, Starting Sun 2021, Historical Med      albuterol (2.5 mg/3 mL) 0.083 % nebulizer solution Starting Wed 2022, Historical Med      albuterol (VENTOLIN HFA) 90 mcg/act  inhaler Inhale 2 puffs every 4 (four) hours as needed for wheezing, Starting Tue 10/2/2018, Normal      AYR SALINE NASAL NO-DRIP NA 1 spray into each nostril daily at bedtime, Historical Med      cetirizine (ZyrTEC) 10 mg tablet Take 10 mg by mouth daily  , Starting Fri 2/27/2015, Historical Med      fluticasone (FLONASE) 50 mcg/act nasal spray 2 sprays into each nostril daily, Starting Tue 10/2/2018, Normal      levothyroxine 125 mcg tablet TAKE 1 TAB BY MOUTH 6 TIMES WEEKLY ON EMPTY STOMACH, Normal      lidocaine (Lidoderm) 5 % Apply 1 patch topically over 12 hours daily Remove & Discard patch within 12 hours or as directed by MD, Starting Mon 8/26/2024, Normal      Multiple Vitamin (multivitamin) tablet Take 1 tablet by mouth daily, Historical Med      !! ondansetron (ZOFRAN-ODT) 4 mg disintegrating tablet Take 1 tablet (4 mg total) by mouth every 6 (six) hours as needed for nausea, Starting Mon 10/3/2022, Normal      vitamin B-12 (CYANOCOBALAMIN) 500 MCG TABS Take 1 tablet (500 mcg total) by mouth daily, Starting Tue 5/18/2021, No Print       !! - Potential duplicate medications found. Please discuss with provider.          ED SEPSIS DOCUMENTATION   Time reflects when diagnosis was documented in both MDM as applicable and the Disposition within this note       Time User Action Codes Description Comment    5/26/2025 11:40 PM Kwame Armendariz [R07.9] Chest pain     5/26/2025 11:40 PM Kwame Armendariz [R06.02] SOB (shortness of breath)     5/26/2025 11:40 PM Kwame Armendariz [R61] Diaphoresis     5/26/2025 11:42 PM Kwame Armendariz [R11.0] Nausea                    Kwame Armendariz PA-C  05/27/25 0636         [1]   Past Medical History:  Diagnosis Date    Asthma     Colon polyp     Fibroid     Hypothyroidism     Idiopathic thrombocytopenic purpura (ITP) (HCC) 1998    req'd splenectomy   [2]   Past Surgical History:  Procedure Laterality Date    SPLENECTOMY  1998    TONSILECTOMY AND ADNOIDECTOMY  1970    TUBAL  LIGATION Bilateral    [3]   Family History  Problem Relation Name Age of Onset    Hypertension Mother      Heart attack Mother      Breast cancer Sister rajan 48    No Known Problems Daughter errol     No Known Problems Maternal Grandmother      No Known Problems Maternal Grandfather      No Known Problems Paternal Grandmother      No Known Problems Paternal Grandfather      Breast cancer Maternal Aunt anika 60    Cancer Maternal Uncle          melanoma    Cancer Maternal Uncle          stomach    No Known Problems Paternal Aunt brigida     Diabetes Family      Colon cancer Neg Hx      Ovarian cancer Neg Hx     [4]   Social History  Tobacco Use    Smoking status: Former     Current packs/day: 0.00     Types: Cigarettes     Quit date:      Years since quittin.4    Smokeless tobacco: Never   Vaping Use    Vaping status: Never Used   Substance Use Topics    Alcohol use: Never    Drug use: Never        Kwmae Armendariz PA-C  25 0636

## 2025-05-27 NOTE — DISCHARGE INSTRUCTIONS
Everything looked okay and your workup tonight.  There is a good chance that your symptoms were from something related to your gastrointestinal system, such as an esophageal spasm.  If it develops again you can try one of the Valium tablets every 12 hours as needed.  If you develop worsening shortness of breath and chest pain you should return to an ER.  I think at this point you should follow-up with a cardiologist for reevaluation of your chest pain, they will call you to set up an appointment soon.

## 2025-05-27 NOTE — ED NOTES
Pt ambulated to bathroom with steady gait and minimal assist. Denies dizziness.     Cayden Jefferson RN  05/26/25 2121